# Patient Record
Sex: FEMALE | Race: WHITE | Employment: OTHER | URBAN - METROPOLITAN AREA
[De-identification: names, ages, dates, MRNs, and addresses within clinical notes are randomized per-mention and may not be internally consistent; named-entity substitution may affect disease eponyms.]

---

## 2017-01-03 RX ORDER — TIZANIDINE 4 MG/1
TABLET ORAL
Qty: 90 TABLET | Refills: 1 | Status: SHIPPED | OUTPATIENT
Start: 2017-01-03 | End: 2017-03-06 | Stop reason: SDUPTHER

## 2017-02-03 DIAGNOSIS — J43.9 PULMONARY EMPHYSEMA, UNSPECIFIED EMPHYSEMA TYPE (HCC): ICD-10-CM

## 2017-02-03 RX ORDER — TIOTROPIUM BROMIDE 18 UG/1
CAPSULE ORAL; RESPIRATORY (INHALATION)
Qty: 30 CAPSULE | Refills: 0 | Status: SHIPPED | OUTPATIENT
Start: 2017-02-03 | End: 2017-03-06 | Stop reason: SDUPTHER

## 2017-03-02 ENCOUNTER — TELEPHONE (OUTPATIENT)
Dept: FAMILY MEDICINE CLINIC | Age: 77
End: 2017-03-02

## 2017-03-06 DIAGNOSIS — J43.9 PULMONARY EMPHYSEMA, UNSPECIFIED EMPHYSEMA TYPE (HCC): ICD-10-CM

## 2017-03-06 RX ORDER — TIZANIDINE 4 MG/1
TABLET ORAL
Qty: 90 TABLET | Refills: 1 | Status: SHIPPED | OUTPATIENT
Start: 2017-03-06 | End: 2017-05-17 | Stop reason: SDUPTHER

## 2017-03-06 RX ORDER — TIOTROPIUM BROMIDE 18 UG/1
CAPSULE ORAL; RESPIRATORY (INHALATION)
Qty: 30 CAPSULE | Refills: 5 | Status: SHIPPED | OUTPATIENT
Start: 2017-03-06 | End: 2017-08-08 | Stop reason: SDUPTHER

## 2017-04-03 ENCOUNTER — TELEPHONE (OUTPATIENT)
Dept: FAMILY MEDICINE CLINIC | Age: 77
End: 2017-04-03

## 2017-04-12 ENCOUNTER — OFFICE VISIT (OUTPATIENT)
Dept: FAMILY MEDICINE CLINIC | Age: 77
End: 2017-04-12

## 2017-04-12 VITALS
WEIGHT: 168 LBS | BODY MASS INDEX: 26.37 KG/M2 | SYSTOLIC BLOOD PRESSURE: 130 MMHG | HEIGHT: 67 IN | DIASTOLIC BLOOD PRESSURE: 80 MMHG | TEMPERATURE: 98.4 F | RESPIRATION RATE: 16 BRPM | HEART RATE: 86 BPM

## 2017-04-12 DIAGNOSIS — N81.4 UTEROVAGINAL PROLAPSE: Primary | ICD-10-CM

## 2017-04-12 DIAGNOSIS — Z72.0 TOBACCO ABUSE: ICD-10-CM

## 2017-04-12 DIAGNOSIS — N28.9 RENAL INSUFFICIENCY: ICD-10-CM

## 2017-04-12 DIAGNOSIS — I87.2 VENOUS INSUFFICIENCY OF BOTH LOWER EXTREMITIES: ICD-10-CM

## 2017-04-12 DIAGNOSIS — N39.3 STRESS INCONTINENCE: ICD-10-CM

## 2017-04-12 DIAGNOSIS — J43.9 PULMONARY EMPHYSEMA, UNSPECIFIED EMPHYSEMA TYPE (HCC): ICD-10-CM

## 2017-04-12 DIAGNOSIS — Z01.810 PRE-OPERATIVE CARDIOVASCULAR EXAMINATION: ICD-10-CM

## 2017-04-12 DIAGNOSIS — I10 ESSENTIAL HYPERTENSION: ICD-10-CM

## 2017-04-12 PROCEDURE — 99214 OFFICE O/P EST MOD 30 MIN: CPT | Performed by: FAMILY MEDICINE

## 2017-04-12 PROCEDURE — 93000 ELECTROCARDIOGRAM COMPLETE: CPT | Performed by: FAMILY MEDICINE

## 2017-04-12 RX ORDER — FUROSEMIDE 20 MG/1
10 TABLET ORAL DAILY
Qty: 60 TABLET | Refills: 3 | COMMUNITY
Start: 2017-04-12 | End: 2017-08-08

## 2017-04-25 ENCOUNTER — TELEPHONE (OUTPATIENT)
Dept: FAMILY MEDICINE CLINIC | Age: 77
End: 2017-04-25

## 2017-05-18 RX ORDER — TIZANIDINE 4 MG/1
TABLET ORAL
Qty: 90 TABLET | Refills: 0 | Status: SHIPPED | OUTPATIENT
Start: 2017-05-18 | End: 2017-06-23 | Stop reason: SDUPTHER

## 2017-06-15 RX ORDER — BUPROPION HYDROCHLORIDE 150 MG/1
TABLET, EXTENDED RELEASE ORAL
Qty: 30 TABLET | Refills: 0 | Status: SHIPPED | OUTPATIENT
Start: 2017-06-15 | End: 2017-07-21 | Stop reason: SDUPTHER

## 2017-06-15 RX ORDER — NABUMETONE 750 MG/1
TABLET, FILM COATED ORAL
Qty: 90 TABLET | Refills: 0 | Status: SHIPPED | OUTPATIENT
Start: 2017-06-15 | End: 2017-09-13 | Stop reason: SDUPTHER

## 2017-06-23 DIAGNOSIS — J43.9 PULMONARY EMPHYSEMA, UNSPECIFIED EMPHYSEMA TYPE (HCC): ICD-10-CM

## 2017-06-23 RX ORDER — TIZANIDINE 4 MG/1
TABLET ORAL
Qty: 90 TABLET | Refills: 3 | Status: SHIPPED | OUTPATIENT
Start: 2017-06-23 | End: 2017-11-01 | Stop reason: SDUPTHER

## 2017-06-23 RX ORDER — THEOPHYLLINE 400 MG/1
TABLET, EXTENDED RELEASE ORAL
Qty: 30 TABLET | Refills: 3 | Status: SHIPPED | OUTPATIENT
Start: 2017-06-23 | End: 2017-10-16 | Stop reason: SDUPTHER

## 2017-06-23 RX ORDER — DILTIAZEM HYDROCHLORIDE 120 MG/1
CAPSULE, EXTENDED RELEASE ORAL
Qty: 30 CAPSULE | Refills: 3 | Status: SHIPPED | OUTPATIENT
Start: 2017-06-23 | End: 2017-10-16 | Stop reason: SDUPTHER

## 2017-07-21 RX ORDER — BUPROPION HYDROCHLORIDE 150 MG/1
TABLET, EXTENDED RELEASE ORAL
Qty: 30 TABLET | Refills: 3 | Status: SHIPPED | OUTPATIENT
Start: 2017-07-21 | End: 2017-10-16 | Stop reason: SDUPTHER

## 2017-08-08 DIAGNOSIS — I87.2 VENOUS INSUFFICIENCY OF BOTH LOWER EXTREMITIES: ICD-10-CM

## 2017-08-08 DIAGNOSIS — J43.9 PULMONARY EMPHYSEMA, UNSPECIFIED EMPHYSEMA TYPE (HCC): ICD-10-CM

## 2017-08-08 RX ORDER — TIOTROPIUM BROMIDE 18 UG/1
CAPSULE ORAL; RESPIRATORY (INHALATION)
Qty: 30 CAPSULE | Refills: 0 | Status: SHIPPED | OUTPATIENT
Start: 2017-08-08 | End: 2017-09-05

## 2017-08-08 RX ORDER — FUROSEMIDE 20 MG/1
TABLET ORAL
Qty: 60 TABLET | Refills: 0 | Status: SHIPPED | OUTPATIENT
Start: 2017-08-08 | End: 2017-10-09 | Stop reason: SDUPTHER

## 2017-08-18 DIAGNOSIS — M19.91 PRIMARY OSTEOARTHRITIS, UNSPECIFIED SITE: ICD-10-CM

## 2017-08-19 RX ORDER — TRAMADOL HYDROCHLORIDE 50 MG/1
TABLET ORAL
Qty: 90 TABLET | Refills: 0 | Status: SHIPPED | OUTPATIENT
Start: 2017-08-19 | End: 2018-01-19 | Stop reason: SDUPTHER

## 2017-09-05 DIAGNOSIS — J43.9 PULMONARY EMPHYSEMA, UNSPECIFIED EMPHYSEMA TYPE (HCC): ICD-10-CM

## 2017-09-05 RX ORDER — TIOTROPIUM BROMIDE 18 UG/1
CAPSULE ORAL; RESPIRATORY (INHALATION)
Qty: 30 CAPSULE | Refills: 5 | Status: SHIPPED | OUTPATIENT
Start: 2017-09-05 | End: 2017-10-16 | Stop reason: SDUPTHER

## 2017-09-13 RX ORDER — NABUMETONE 750 MG/1
TABLET, FILM COATED ORAL
Qty: 90 TABLET | Refills: 0 | Status: SHIPPED | OUTPATIENT
Start: 2017-09-13 | End: 2017-12-04 | Stop reason: SDUPTHER

## 2017-10-16 ENCOUNTER — OFFICE VISIT (OUTPATIENT)
Dept: FAMILY MEDICINE CLINIC | Age: 77
End: 2017-10-16

## 2017-10-16 VITALS
TEMPERATURE: 97.5 F | SYSTOLIC BLOOD PRESSURE: 138 MMHG | BODY MASS INDEX: 25.43 KG/M2 | RESPIRATION RATE: 16 BRPM | HEIGHT: 67 IN | WEIGHT: 162 LBS | HEART RATE: 94 BPM | DIASTOLIC BLOOD PRESSURE: 88 MMHG

## 2017-10-16 DIAGNOSIS — E78.00 HYPERCHOLESTEROLEMIA: ICD-10-CM

## 2017-10-16 DIAGNOSIS — J45.40 MODERATE PERSISTENT ASTHMA WITHOUT COMPLICATION: ICD-10-CM

## 2017-10-16 DIAGNOSIS — N28.9 RENAL INSUFFICIENCY: ICD-10-CM

## 2017-10-16 DIAGNOSIS — Z72.0 TOBACCO ABUSE: ICD-10-CM

## 2017-10-16 DIAGNOSIS — F43.22 ADJUSTMENT DISORDER WITH ANXIOUS MOOD: ICD-10-CM

## 2017-10-16 DIAGNOSIS — I10 ESSENTIAL HYPERTENSION: ICD-10-CM

## 2017-10-16 DIAGNOSIS — J43.9 PULMONARY EMPHYSEMA, UNSPECIFIED EMPHYSEMA TYPE (HCC): Primary | ICD-10-CM

## 2017-10-16 DIAGNOSIS — K40.90 NON-RECURRENT UNILATERAL INGUINAL HERNIA WITHOUT OBSTRUCTION OR GANGRENE: ICD-10-CM

## 2017-10-16 DIAGNOSIS — Z23 NEEDS FLU SHOT: ICD-10-CM

## 2017-10-16 PROCEDURE — 90662 IIV NO PRSV INCREASED AG IM: CPT | Performed by: FAMILY MEDICINE

## 2017-10-16 PROCEDURE — 99214 OFFICE O/P EST MOD 30 MIN: CPT | Performed by: FAMILY MEDICINE

## 2017-10-16 PROCEDURE — G0008 ADMIN INFLUENZA VIRUS VAC: HCPCS | Performed by: FAMILY MEDICINE

## 2017-10-16 RX ORDER — DILTIAZEM HYDROCHLORIDE 120 MG/1
120 CAPSULE, COATED, EXTENDED RELEASE ORAL DAILY
Qty: 90 CAPSULE | Refills: 1 | Status: SHIPPED | OUTPATIENT
Start: 2017-10-16 | End: 2018-04-16 | Stop reason: SDUPTHER

## 2017-10-16 RX ORDER — THEOPHYLLINE 400 MG/1
200 TABLET, EXTENDED RELEASE ORAL 2 TIMES DAILY
Qty: 90 TABLET | Refills: 1 | Status: SHIPPED | OUTPATIENT
Start: 2017-10-16 | End: 2018-04-16 | Stop reason: SDUPTHER

## 2017-10-16 RX ORDER — BUPROPION HYDROCHLORIDE 150 MG/1
150 TABLET, EXTENDED RELEASE ORAL DAILY
Qty: 90 TABLET | Refills: 1 | Status: SHIPPED | OUTPATIENT
Start: 2017-10-16 | End: 2018-05-10 | Stop reason: SDUPTHER

## 2017-10-16 NOTE — PROGRESS NOTES
Vaccine Information Sheet, \"Influenza - Inactivated\"  given to Garrett Euceda, or parent/legal guardian of  Garrett Euceda and verbalized understanding. Patient responses:    Have you ever had a reaction to a flu vaccine? No  Are you able to eat eggs without adverse effects? No  Do you have any current illness? No  Have you ever had Guillian Century Syndrome? No    Flu vaccine given per order. Please see immunization tab.

## 2017-10-16 NOTE — PROGRESS NOTES
CHART REVIEW  Health Maintenance   Topic Date Due    DTaP/Tdap/Td vaccine (2 - Td) 07/23/2013    Flu vaccine (1) 09/01/2017    Colon cancer screen colonoscopy  09/01/2018    Lipid screen  06/24/2021    Zostavax vaccine  Completed    DEXA (modify frequency per FRAX score)  Completed    Pneumococcal low/med risk  Completed      Patient Active Problem List   Diagnosis    Asthma    HTN (hypertension)    Renal insufficiency    Cataract    Hypercholesterolemia LDL goal < 160    Stress incontinence    Macular degeneration    Psoriasis    Osteoporosis- DXA -3.2 (11/14), -2.9 (8/11), -3.3 (9/08)    Osteoarthritis    IBS (irritable bowel syndrome)    Colon polyp- colon due 9/13    Screening mammogram, encounter for    Tobacco abuse    Pulmonary nodule, right    Frequent PVCs    COPD (chronic obstructive pulmonary disease) ([facility] [address]    Eczema of right eyelid    Adjustment disorder with anxious mood    Sciatica    Low back pain    Inguinal hernia, right    Venous insufficiency of both lower extremities    Uterovaginal prolapse    Non-recurrent unilateral inguinal hernia without obstruction or gangrene     Cholesterol, Total   Date Value Ref Range Status   06/24/2016 192 0 - 199 mg/dL Final     LDL Calculated   Date Value Ref Range Status   06/24/2016 81 <100 mg/dL Final     HDL   Date Value Ref Range Status   06/24/2016 88 (H) 40 - 60 mg/dL Final   03/20/2010 60 40 - 60 mg/dl Final     Triglycerides   Date Value Ref Range Status   06/24/2016 117 0 - 150 mg/dL Final     Lab Results   Component Value Date    GLUCOSE 107 (H) 12/12/2016     Lab Results   Component Value Date     12/12/2016    K 4.2 12/12/2016    CREATININE 1.0 12/12/2016     Lab Results   Component Value Date    WBC 9.1 12/12/2016    HGB 13.6 12/12/2016    HCT 40.8 12/12/2016    MCV 92.7 12/12/2016     12/12/2016     Lab Results   Component Value Date    ALT 16 06/24/2016    AST 17 06/24/2016    ALKPHOS 97 06/24/2016 surgical, social and family histories were reviewed and updated as appropriate (See above). Objective:   PHYSICAL EXAM  /88 (Site: Right Arm, Position: Sitting, Cuff Size: Large Adult)   Pulse 94   Temp 97.5 °F (36.4 °C) (Oral)   Resp 16   Ht 5' 7\" (1.702 m)   Wt 162 lb (73.5 kg)   BMI 25.37 kg/m²   Blood pressure is Good. BP Readings from Last 5 Encounters:   10/16/17 138/88   04/12/17 130/80   12/12/16 130/80   08/26/16 120/70   06/09/16 130/80     Weight is increased. Wt Readings from Last 5 Encounters:   10/16/17 162 lb (73.5 kg)   04/12/17 168 lb (76.2 kg)   12/12/16 168 lb (76.2 kg)   08/26/16 174 lb (78.9 kg)   06/09/16 174 lb (78.9 kg)      GENERAL:   · well-developed, well-nourished, alert, no distress. EYES: glasses  · External findings: lids and lashes normal and conjunctivae and sclerae normal  · Eyes: no periorbital cellulitis. ENT:   · External nose and ears appear normal  NECK:   · No adenopathy, supple, symmetrical, trachea midline  · Thyroid not enlarged, symmetric, no tenderness/mass/nodules  LYMPH:  · no cervical nodes, no supraclavicular nodes  LUNGS:    · Breathing unlabored  · clear to auscultation bilaterally and good air movement  · Palpation: Normal  CARDIOVASC:   · Regular rate and rhythm, S1, S2 normal. No murmur, click, rub or gallop  · Apical impulse normal  · LEGS:  Lower extremity edema: none    SKIN: warm and dry  PSYCH:    · Alert and oriented  · Normal reasoning, insight good  · Facial expressions full, mood appropriate  · ABD- small L inguinal hernia, easily reducible      Assessment and Plan:     1. Pulmonary emphysema, unspecified emphysema type (HCC)  tiotropium (SPIRIVA HANDIHALER) 18 MCG inhalation capsule    theophylline (UNIPHYL) 400 MG extended release tablet    CBC Auto Differential   2. Needs flu shot  INFLUENZA, HIGH DOSE, 65 YRS +, IM, PF, PREFILL SYR, 0.5ML (FLUZONE HD)   3. Moderate persistent asthma without complication     4.  Essential

## 2017-10-16 NOTE — PATIENT INSTRUCTIONS
If you have questions about a medical condition or this instruction, always ask your healthcare professional. [place] [id number] any warranty or liability for your use of this information.

## 2017-10-20 DIAGNOSIS — I10 ESSENTIAL HYPERTENSION: ICD-10-CM

## 2017-10-20 DIAGNOSIS — E78.00 HYPERCHOLESTEROLEMIA: ICD-10-CM

## 2017-10-20 DIAGNOSIS — J43.9 PULMONARY EMPHYSEMA, UNSPECIFIED EMPHYSEMA TYPE (HCC): ICD-10-CM

## 2017-10-20 LAB
A/G RATIO: 1.3 (ref 1.1–2.2)
ALBUMIN SERPL-MCNC: 4.2 G/DL (ref 3.4–5)
ALP BLD-CCNC: 118 U/L (ref 40–129)
ALT SERPL-CCNC: 18 U/L (ref 10–40)
ANION GAP SERPL CALCULATED.3IONS-SCNC: 16 MMOL/L (ref 3–16)
AST SERPL-CCNC: 17 U/L (ref 15–37)
BASOPHILS ABSOLUTE: 0.1 K/UL (ref 0–0.2)
BASOPHILS RELATIVE PERCENT: 0.6 %
BILIRUB SERPL-MCNC: 0.3 MG/DL (ref 0–1)
BUN BLDV-MCNC: 19 MG/DL (ref 7–20)
CALCIUM SERPL-MCNC: 9.9 MG/DL (ref 8.3–10.6)
CHLORIDE BLD-SCNC: 99 MMOL/L (ref 99–110)
CHOLESTEROL, TOTAL: 183 MG/DL (ref 0–199)
CO2: 28 MMOL/L (ref 21–32)
CREAT SERPL-MCNC: 0.9 MG/DL (ref 0.6–1.2)
EOSINOPHILS ABSOLUTE: 0.3 K/UL (ref 0–0.6)
EOSINOPHILS RELATIVE PERCENT: 3 %
GFR AFRICAN AMERICAN: >60
GFR NON-AFRICAN AMERICAN: >60
GLOBULIN: 3.3 G/DL
GLUCOSE BLD-MCNC: 101 MG/DL (ref 70–99)
HCT VFR BLD CALC: 42.9 % (ref 36–48)
HDLC SERPL-MCNC: 98 MG/DL (ref 40–60)
HEMOGLOBIN: 14.4 G/DL (ref 12–16)
LDL CHOLESTEROL CALCULATED: 67 MG/DL
LYMPHOCYTES ABSOLUTE: 1.6 K/UL (ref 1–5.1)
LYMPHOCYTES RELATIVE PERCENT: 19.1 %
MCH RBC QN AUTO: 32.2 PG (ref 26–34)
MCHC RBC AUTO-ENTMCNC: 33.5 G/DL (ref 31–36)
MCV RBC AUTO: 96.1 FL (ref 80–100)
MONOCYTES ABSOLUTE: 1.2 K/UL (ref 0–1.3)
MONOCYTES RELATIVE PERCENT: 14.6 %
NEUTROPHILS ABSOLUTE: 5.3 K/UL (ref 1.7–7.7)
NEUTROPHILS RELATIVE PERCENT: 62.7 %
PDW BLD-RTO: 15 % (ref 12.4–15.4)
PLATELET # BLD: 279 K/UL (ref 135–450)
PMV BLD AUTO: 8.5 FL (ref 5–10.5)
POTASSIUM SERPL-SCNC: 4.4 MMOL/L (ref 3.5–5.1)
RBC # BLD: 4.46 M/UL (ref 4–5.2)
SODIUM BLD-SCNC: 143 MMOL/L (ref 136–145)
TOTAL PROTEIN: 7.5 G/DL (ref 6.4–8.2)
TRIGL SERPL-MCNC: 88 MG/DL (ref 0–150)
VLDLC SERPL CALC-MCNC: 18 MG/DL
WBC # BLD: 8.5 K/UL (ref 4–11)

## 2017-12-05 RX ORDER — NABUMETONE 750 MG/1
TABLET, FILM COATED ORAL
Qty: 90 TABLET | Refills: 0 | Status: SHIPPED | OUTPATIENT
Start: 2017-12-05 | End: 2018-03-09 | Stop reason: SDUPTHER

## 2018-01-19 DIAGNOSIS — M19.91 PRIMARY OSTEOARTHRITIS, UNSPECIFIED SITE: ICD-10-CM

## 2018-01-19 RX ORDER — TRAMADOL HYDROCHLORIDE 50 MG/1
TABLET ORAL
Qty: 90 TABLET | Refills: 0 | Status: SHIPPED | OUTPATIENT
Start: 2018-01-19 | End: 2019-10-02 | Stop reason: SDUPTHER

## 2018-01-19 RX ORDER — TIZANIDINE 4 MG/1
TABLET ORAL
Qty: 90 TABLET | Refills: 0 | Status: SHIPPED | OUTPATIENT
Start: 2018-01-19 | End: 2018-02-13 | Stop reason: SDUPTHER

## 2018-02-14 RX ORDER — TIZANIDINE 4 MG/1
TABLET ORAL
Qty: 90 TABLET | Refills: 0 | Status: SHIPPED | OUTPATIENT
Start: 2018-02-14 | End: 2018-04-02 | Stop reason: SDUPTHER

## 2018-03-09 RX ORDER — NABUMETONE 750 MG/1
TABLET, FILM COATED ORAL
Qty: 90 TABLET | Refills: 1 | Status: SHIPPED | OUTPATIENT
Start: 2018-03-09 | End: 2018-09-07 | Stop reason: SDUPTHER

## 2018-04-03 RX ORDER — TIZANIDINE 4 MG/1
TABLET ORAL
Qty: 90 TABLET | Refills: 0 | Status: SHIPPED | OUTPATIENT
Start: 2018-04-03 | End: 2018-05-10 | Stop reason: SDUPTHER

## 2018-04-16 ENCOUNTER — OFFICE VISIT (OUTPATIENT)
Dept: FAMILY MEDICINE CLINIC | Age: 78
End: 2018-04-16

## 2018-04-16 VITALS
RESPIRATION RATE: 16 BRPM | WEIGHT: 159 LBS | DIASTOLIC BLOOD PRESSURE: 84 MMHG | TEMPERATURE: 98.4 F | BODY MASS INDEX: 24.96 KG/M2 | SYSTOLIC BLOOD PRESSURE: 136 MMHG | HEIGHT: 67 IN | HEART RATE: 90 BPM

## 2018-04-16 DIAGNOSIS — K59.09 CHRONIC CONSTIPATION: ICD-10-CM

## 2018-04-16 DIAGNOSIS — I10 ESSENTIAL HYPERTENSION: ICD-10-CM

## 2018-04-16 DIAGNOSIS — Z72.0 TOBACCO ABUSE: ICD-10-CM

## 2018-04-16 DIAGNOSIS — I87.2 VENOUS INSUFFICIENCY OF BOTH LOWER EXTREMITIES: ICD-10-CM

## 2018-04-16 DIAGNOSIS — R73.9 HYPERGLYCEMIA: ICD-10-CM

## 2018-04-16 DIAGNOSIS — J43.9 PULMONARY EMPHYSEMA, UNSPECIFIED EMPHYSEMA TYPE (HCC): Primary | ICD-10-CM

## 2018-04-16 PROCEDURE — G8427 DOCREV CUR MEDS BY ELIG CLIN: HCPCS | Performed by: FAMILY MEDICINE

## 2018-04-16 PROCEDURE — 1123F ACP DISCUSS/DSCN MKR DOCD: CPT | Performed by: FAMILY MEDICINE

## 2018-04-16 PROCEDURE — 3023F SPIROM DOC REV: CPT | Performed by: FAMILY MEDICINE

## 2018-04-16 PROCEDURE — 4040F PNEUMOC VAC/ADMIN/RCVD: CPT | Performed by: FAMILY MEDICINE

## 2018-04-16 PROCEDURE — 4004F PT TOBACCO SCREEN RCVD TLK: CPT | Performed by: FAMILY MEDICINE

## 2018-04-16 PROCEDURE — G8926 SPIRO NO PERF OR DOC: HCPCS | Performed by: FAMILY MEDICINE

## 2018-04-16 PROCEDURE — G8399 PT W/DXA RESULTS DOCUMENT: HCPCS | Performed by: FAMILY MEDICINE

## 2018-04-16 PROCEDURE — 99214 OFFICE O/P EST MOD 30 MIN: CPT | Performed by: FAMILY MEDICINE

## 2018-04-16 PROCEDURE — 1090F PRES/ABSN URINE INCON ASSESS: CPT | Performed by: FAMILY MEDICINE

## 2018-04-16 PROCEDURE — G8420 CALC BMI NORM PARAMETERS: HCPCS | Performed by: FAMILY MEDICINE

## 2018-04-16 RX ORDER — FUROSEMIDE 20 MG/1
TABLET ORAL
Qty: 60 TABLET | Refills: 2 | Status: SHIPPED | OUTPATIENT
Start: 2018-04-16 | End: 2018-08-20 | Stop reason: DRUGHIGH

## 2018-04-16 RX ORDER — DILTIAZEM HYDROCHLORIDE 120 MG/1
120 CAPSULE, COATED, EXTENDED RELEASE ORAL DAILY
Qty: 90 CAPSULE | Refills: 1 | Status: SHIPPED | OUTPATIENT
Start: 2018-04-16 | End: 2018-10-10 | Stop reason: SDUPTHER

## 2018-04-16 RX ORDER — THEOPHYLLINE 400 MG/1
200 TABLET, EXTENDED RELEASE ORAL 2 TIMES DAILY
Qty: 90 TABLET | Refills: 1 | Status: SHIPPED | OUTPATIENT
Start: 2018-04-16 | End: 2018-10-16 | Stop reason: SDUPTHER

## 2018-04-16 ASSESSMENT — PATIENT HEALTH QUESTIONNAIRE - PHQ9
1. LITTLE INTEREST OR PLEASURE IN DOING THINGS: 0
2. FEELING DOWN, DEPRESSED OR HOPELESS: 0
SUM OF ALL RESPONSES TO PHQ QUESTIONS 1-9: 0
SUM OF ALL RESPONSES TO PHQ9 QUESTIONS 1 & 2: 0

## 2018-04-30 RX ORDER — ALBUTEROL SULFATE 90 UG/1
2 AEROSOL, METERED RESPIRATORY (INHALATION) EVERY 4 HOURS PRN
Qty: 8.5 G | Refills: 0 | Status: SHIPPED | OUTPATIENT
Start: 2018-04-30 | End: 2018-10-16 | Stop reason: SDUPTHER

## 2018-05-10 DIAGNOSIS — F43.22 ADJUSTMENT DISORDER WITH ANXIOUS MOOD: ICD-10-CM

## 2018-05-11 RX ORDER — BUPROPION HYDROCHLORIDE 150 MG/1
150 TABLET, EXTENDED RELEASE ORAL DAILY
Qty: 90 TABLET | Refills: 1 | Status: SHIPPED | OUTPATIENT
Start: 2018-05-11 | End: 2018-11-14 | Stop reason: SDUPTHER

## 2018-05-11 RX ORDER — TIZANIDINE 4 MG/1
TABLET ORAL
Qty: 90 TABLET | Refills: 1 | Status: SHIPPED | OUTPATIENT
Start: 2018-05-11 | End: 2018-07-20 | Stop reason: SDUPTHER

## 2018-06-07 DIAGNOSIS — J43.9 PULMONARY EMPHYSEMA, UNSPECIFIED EMPHYSEMA TYPE (HCC): ICD-10-CM

## 2018-06-07 RX ORDER — TIOTROPIUM BROMIDE 18 UG/1
CAPSULE ORAL; RESPIRATORY (INHALATION)
Qty: 90 CAPSULE | Refills: 1 | Status: SHIPPED | OUTPATIENT
Start: 2018-06-07 | End: 2018-12-11 | Stop reason: SDUPTHER

## 2018-06-08 ENCOUNTER — TELEPHONE (OUTPATIENT)
Dept: FAMILY MEDICINE CLINIC | Age: 78
End: 2018-06-08

## 2018-06-08 DIAGNOSIS — H26.9 CATARACT, UNSPECIFIED CATARACT TYPE, UNSPECIFIED LATERALITY: Primary | ICD-10-CM

## 2018-06-08 DIAGNOSIS — H35.30 MACULAR DEGENERATION: ICD-10-CM

## 2018-07-17 RX ORDER — DICYCLOMINE HCL 20 MG
TABLET ORAL
Qty: 360 TABLET | Refills: 5 | Status: SHIPPED | OUTPATIENT
Start: 2018-07-17 | End: 2019-04-16 | Stop reason: SDUPTHER

## 2018-07-23 ENCOUNTER — TELEPHONE (OUTPATIENT)
Dept: FAMILY MEDICINE CLINIC | Age: 78
End: 2018-07-23

## 2018-07-23 DIAGNOSIS — K40.90 NON-RECURRENT UNILATERAL INGUINAL HERNIA WITHOUT OBSTRUCTION OR GANGRENE: Primary | ICD-10-CM

## 2018-07-23 RX ORDER — TIZANIDINE 4 MG/1
TABLET ORAL
Qty: 90 TABLET | Refills: 0 | Status: SHIPPED | OUTPATIENT
Start: 2018-07-23 | End: 2018-08-24 | Stop reason: SDUPTHER

## 2018-07-23 NOTE — TELEPHONE ENCOUNTER
Referral in for surgery  General, Laparoscopic & Vascular Surgery, [address], [address] [place] [id number]   Phone: [phone]  Fax: [phone]

## 2018-07-26 ENCOUNTER — INITIAL CONSULT (OUTPATIENT)
Dept: SURGERY | Age: 78
End: 2018-07-26

## 2018-07-26 VITALS
HEIGHT: 67 IN | DIASTOLIC BLOOD PRESSURE: 100 MMHG | HEART RATE: 103 BPM | WEIGHT: 153 LBS | SYSTOLIC BLOOD PRESSURE: 195 MMHG | BODY MASS INDEX: 24.01 KG/M2

## 2018-07-26 DIAGNOSIS — Z72.0 TOBACCO ABUSE: ICD-10-CM

## 2018-07-26 DIAGNOSIS — K40.90 LEFT INGUINAL HERNIA: Primary | ICD-10-CM

## 2018-07-26 DIAGNOSIS — I10 ESSENTIAL HYPERTENSION: ICD-10-CM

## 2018-07-26 PROCEDURE — 1101F PT FALLS ASSESS-DOCD LE1/YR: CPT | Performed by: SURGERY

## 2018-07-26 PROCEDURE — G8427 DOCREV CUR MEDS BY ELIG CLIN: HCPCS | Performed by: SURGERY

## 2018-07-26 PROCEDURE — 1090F PRES/ABSN URINE INCON ASSESS: CPT | Performed by: SURGERY

## 2018-07-26 PROCEDURE — 99204 OFFICE O/P NEW MOD 45 MIN: CPT | Performed by: SURGERY

## 2018-07-26 PROCEDURE — G8420 CALC BMI NORM PARAMETERS: HCPCS | Performed by: SURGERY

## 2018-07-26 ASSESSMENT — ENCOUNTER SYMPTOMS
SHORTNESS OF BREATH: 1
WHEEZING: 1
ABDOMINAL PAIN: 1
VOMITING: 0
CONSTIPATION: 1
NAUSEA: 0

## 2018-07-26 NOTE — PROGRESS NOTES
Subjective:      Patient ID: Stephani Love is a [de-identified] y.o. female. HPI  Chief Complaint: hernia  Patient referred by Dr. Parth Mccabe for evaluation of hernia. Patient reports symptoms of discomfort, swelling. C/o nocturia and urinary frequency over last year. Location of symptoms is left groin. C/o constipation improved with metamucil. Symptoms were first noted one year ago. History of right inguinal hernia repair as well as bladder sling. Patient has a history of emphysema and continues to smoke. Not on oxygen. BP today 200/100s. Will plan following treatment: left inguinal hernia repair once medically cleared and BP improved.     Past Medical History:   Diagnosis Date    HTN (hypertension)     Hypercholesterolemia     MAC (mycobacterium avium-intracellulare complex)     Osteoarthritis     Osteopetrosis     Papanicolaou smear of cervix with atypical squamous cells of undetermined significance (ASC-US) 1998    Shingles     Stress incontinence        Past Surgical History:   Procedure Laterality Date    BREAST SURGERY      CATARACT REMOVAL Left 2009    CHOLECYSTECTOMY      INGUINAL HERNIA REPAIR Right 2015       Current Outpatient Prescriptions   Medication Sig Dispense Refill    tiZANidine (ZANAFLEX) 4 MG tablet TAKE 1 TABLET BY MOUTH THREE TIMES DAILY AS NEEDED 90 tablet 0    dicyclomine (BENTYL) 20 MG tablet TAKE 1 TABLET BY MOUTH EVERY 6 HOURS 360 tablet 5    SPIRIVA HANDIHALER 18 MCG inhalation capsule INHALE THE CONTENTS OF 1 CAPSULE INTO THE LUNGS DAILY VIA HANDIHALER 90 capsule 1    buPROPion (WELLBUTRIN SR) 150 MG extended release tablet TAKE 1 TABLET BY MOUTH DAILY 90 tablet 1    albuterol sulfate HFA (PROAIR HFA) 108 (90 Base) MCG/ACT inhaler Inhale 2 puffs into the lungs every 4 hours as needed for Wheezing 8.5 g 0    theophylline (UNIPHYL) 400 MG extended release tablet Take 0.5 tablets by mouth 2 times daily 90 tablet 1    furosemide (LASIX) 20 MG tablet TAKE 1 TO 2 TABLETS BY MOUTH She has a normal mood and affect. Her behavior is normal. Judgment and thought content normal.   Vitals reviewed. Assessment:       Diagnosis Orders   1. Left inguinal hernia     2. Tobacco abuse     3. Essential hypertension             Plan:      Recommended left inguinal hernia repair  The risks, benefits and alternatives to the planned procedure were discussed. Patient expressed an understanding and is willing to proceed. Biggest risk is likely anesthesia given her pulmonary status. Also significantly hypertensive today. Needs improvement in BP with medical clearance and preop H+P prior to proceeding  Encouraged smoking cessation but she has no interest  Discussed unlikely to improve urinary frequency.   Needs to F/u with Dr. Na Hebert

## 2018-08-20 ENCOUNTER — OFFICE VISIT (OUTPATIENT)
Dept: FAMILY MEDICINE CLINIC | Age: 78
End: 2018-08-20

## 2018-08-20 VITALS
HEIGHT: 67 IN | TEMPERATURE: 98.4 F | RESPIRATION RATE: 16 BRPM | BODY MASS INDEX: 23.7 KG/M2 | DIASTOLIC BLOOD PRESSURE: 100 MMHG | SYSTOLIC BLOOD PRESSURE: 164 MMHG | HEART RATE: 102 BPM | WEIGHT: 151 LBS

## 2018-08-20 DIAGNOSIS — I87.2 VENOUS INSUFFICIENCY OF BOTH LOWER EXTREMITIES: ICD-10-CM

## 2018-08-20 DIAGNOSIS — R73.9 HYPERGLYCEMIA: ICD-10-CM

## 2018-08-20 DIAGNOSIS — K40.90 LEFT INGUINAL HERNIA: Primary | ICD-10-CM

## 2018-08-20 DIAGNOSIS — R53.83 MALAISE AND FATIGUE: ICD-10-CM

## 2018-08-20 DIAGNOSIS — I10 UNCONTROLLED HYPERTENSION: ICD-10-CM

## 2018-08-20 DIAGNOSIS — R53.81 MALAISE AND FATIGUE: ICD-10-CM

## 2018-08-20 LAB
ANION GAP SERPL CALCULATED.3IONS-SCNC: 16 MMOL/L (ref 3–16)
BASOPHILS ABSOLUTE: 0.1 K/UL (ref 0–0.2)
BASOPHILS RELATIVE PERCENT: 0.8 %
BUN BLDV-MCNC: 18 MG/DL (ref 7–20)
CALCIUM SERPL-MCNC: 9.7 MG/DL (ref 8.3–10.6)
CHLORIDE BLD-SCNC: 100 MMOL/L (ref 99–110)
CO2: 25 MMOL/L (ref 21–32)
CREAT SERPL-MCNC: 1.1 MG/DL (ref 0.6–1.2)
EOSINOPHILS ABSOLUTE: 0.1 K/UL (ref 0–0.6)
EOSINOPHILS RELATIVE PERCENT: 1.3 %
GFR AFRICAN AMERICAN: 58
GFR NON-AFRICAN AMERICAN: 48
GLUCOSE BLD-MCNC: 103 MG/DL (ref 70–99)
HCT VFR BLD CALC: 41.4 % (ref 36–48)
HEMOGLOBIN: 13.9 G/DL (ref 12–16)
LYMPHOCYTES ABSOLUTE: 1.8 K/UL (ref 1–5.1)
LYMPHOCYTES RELATIVE PERCENT: 19.8 %
MCH RBC QN AUTO: 32.3 PG (ref 26–34)
MCHC RBC AUTO-ENTMCNC: 33.6 G/DL (ref 31–36)
MCV RBC AUTO: 95.9 FL (ref 80–100)
MONOCYTES ABSOLUTE: 0.9 K/UL (ref 0–1.3)
MONOCYTES RELATIVE PERCENT: 10.5 %
NEUTROPHILS ABSOLUTE: 6.1 K/UL (ref 1.7–7.7)
NEUTROPHILS RELATIVE PERCENT: 67.6 %
PDW BLD-RTO: 15.6 % (ref 12.4–15.4)
PLATELET # BLD: 297 K/UL (ref 135–450)
PMV BLD AUTO: 8.3 FL (ref 5–10.5)
POTASSIUM SERPL-SCNC: 4.3 MMOL/L (ref 3.5–5.1)
RBC # BLD: 4.32 M/UL (ref 4–5.2)
SODIUM BLD-SCNC: 141 MMOL/L (ref 136–145)
TSH SERPL DL<=0.05 MIU/L-ACNC: 1.52 UIU/ML (ref 0.27–4.2)
WBC # BLD: 8.9 K/UL (ref 4–11)

## 2018-08-20 PROCEDURE — G8420 CALC BMI NORM PARAMETERS: HCPCS | Performed by: FAMILY MEDICINE

## 2018-08-20 PROCEDURE — 4004F PT TOBACCO SCREEN RCVD TLK: CPT | Performed by: FAMILY MEDICINE

## 2018-08-20 PROCEDURE — 1123F ACP DISCUSS/DSCN MKR DOCD: CPT | Performed by: FAMILY MEDICINE

## 2018-08-20 PROCEDURE — 1101F PT FALLS ASSESS-DOCD LE1/YR: CPT | Performed by: FAMILY MEDICINE

## 2018-08-20 PROCEDURE — 93000 ELECTROCARDIOGRAM COMPLETE: CPT | Performed by: FAMILY MEDICINE

## 2018-08-20 PROCEDURE — 1090F PRES/ABSN URINE INCON ASSESS: CPT | Performed by: FAMILY MEDICINE

## 2018-08-20 PROCEDURE — 99214 OFFICE O/P EST MOD 30 MIN: CPT | Performed by: FAMILY MEDICINE

## 2018-08-20 PROCEDURE — G8399 PT W/DXA RESULTS DOCUMENT: HCPCS | Performed by: FAMILY MEDICINE

## 2018-08-20 PROCEDURE — G8427 DOCREV CUR MEDS BY ELIG CLIN: HCPCS | Performed by: FAMILY MEDICINE

## 2018-08-20 PROCEDURE — 4040F PNEUMOC VAC/ADMIN/RCVD: CPT | Performed by: FAMILY MEDICINE

## 2018-08-20 RX ORDER — FUROSEMIDE 20 MG/1
TABLET ORAL
Qty: 60 TABLET | Refills: 2 | Status: SHIPPED
Start: 2018-08-20 | End: 2019-01-17

## 2018-08-20 RX ORDER — METOPROLOL SUCCINATE 50 MG/1
50 TABLET, EXTENDED RELEASE ORAL DAILY
Qty: 30 TABLET | Refills: 3 | Status: SHIPPED | OUTPATIENT
Start: 2018-08-20 | End: 2018-09-26 | Stop reason: SDUPTHER

## 2018-08-20 NOTE — PROGRESS NOTES
mg by mouth daily.  calcium-vitamin D (OSCAL) 250-125 MG-UNIT per tablet Take 1 tablet by mouth daily.  Simethicone (GAS-X EXTRA STRENGTH) 125 MG CAPS Take 1-4 capsules by mouth daily.  diphenhydrAMINE (BENADRYL) 25 MG tablet Take 25 mg by mouth 2 times daily. No current facility-administered medications for this visit. Cholesterol, Total   Date Value Ref Range Status   10/20/2017 183 0 - 199 mg/dL Final     LDL Calculated   Date Value Ref Range Status   10/20/2017 67 <100 mg/dL Final     HDL   Date Value Ref Range Status   10/20/2017 98 (H) 40 - 60 mg/dL Final   2010 60 40 - 60 mg/dl Final     Triglycerides   Date Value Ref Range Status   10/20/2017 88 0 - 150 mg/dL Final     Lab Results   Component Value Date    GLUCOSE 101 (H) 10/20/2017     Lab Results   Component Value Date     10/20/2017    K 4.4 10/20/2017    CREATININE 0.9 10/20/2017     Lab Results   Component Value Date    WBC 8.5 10/20/2017    HGB 14.4 10/20/2017    HCT 42.9 10/20/2017    MCV 96.1 10/20/2017     10/20/2017     Lab Results   Component Value Date    ALT 18 10/20/2017    AST 17 10/20/2017    ALKPHOS 118 10/20/2017    BILITOT 0.3 10/20/2017     TSH (uIU/mL)   Date Value   2015 1.00     Lab Results   Component Value Date    LABA1C 5.3 2016      Subjective:   HPI CHRONIC:   Chief Complaint   Patient presents with    Hypertension    Patient here for follow-up of multiple chronic conditions includin. Left inguinal hernia    2. Uncontrolled hypertension    3. Venous insufficiency of both lower extremities    4. Malaise and fatigue    5. Hyperglycemia      Complaints: BP was high at surgeon office   Feeling anxious about hernia surgery  More fatigued than usual recently  Continuous polyuria. Trying off lasix did not help    · Following appropriate diet? Yes  · Exercise: walking intermittently  · Taking medicines daily as directed? Yes  · Any side effects of medications? No    ROS:  General ROS: fever? No,    night sweats? No  Ophthalmic ROS:blurry vision or decreased vision? No  Endocrine ROS: fatigue? No   unexpected weight changes? No  Respiratory ROS: cough? No   shortness of breath? No  Cardiovascular ROS:chest pain? No   shortness of breath with exertion? No  Gastrointestinal ROS: abdominal pain? No   change in stools? No  Genito-Urinary ROS: painful urination? No   trouble voiding? No  Neurological ROS: TIA or stroke symptoms? No   numbness/tingling in feet? No  Dermatological ROS: rash? No     HISTORY:  Patient's medications, allergies, past medical, surgical, social and family histories were reviewed and updated as appropriate (See above). Objective:   PHYSICAL EXAM   BP (!) 164/100 (Site: Right Arm, Position: Sitting, Cuff Size: Large Adult)   Pulse 102   Temp 98.4 °F (36.9 °C) (Oral)   Resp 16   Ht 5' 7\" (1.702 m)   Wt 151 lb (68.5 kg)   BMI 23.65 kg/m²   Blood pressure is Poor. BP Readings from Last 5 Encounters:   08/20/18 (!) 164/100   07/26/18 (!) 195/100   04/16/18 136/84   10/16/17 138/88   04/12/17 130/80     Weight is decreased. Wt Readings from Last 5 Encounters:   08/20/18 151 lb (68.5 kg)   07/26/18 153 lb (69.4 kg)   04/16/18 159 lb (72.1 kg)   10/16/17 162 lb (73.5 kg)   04/12/17 168 lb (76.2 kg)      GENERAL:   · well-developed, well-nourished, alert, no distress. EYES:   · External findings: lids and lashes normal and conjunctivae and sclerae normal  LUNGS:    · Breathing unlabored  · clear to auscultation bilaterally and good air movement  CARDIOVASC:   · Tachy, regular rhythm, S1, S2 normal. No murmur, click, rub or gallop  · Apical impulse normal  · LEGS:  Lower extremity edema: none    SKIN: warm and dry  PSYCH:    · Alert and oriented  · Normal reasoning, insight good  · Facial expressions full, mood appropriate  · No memory disturbance noted    EKG: normal sinus rhythm, LAE, unchanged from previous tracings. No acute changes.

## 2018-08-21 LAB
ESTIMATED AVERAGE GLUCOSE: 93.9 MG/DL
HBA1C MFR BLD: 4.9 %

## 2018-08-27 RX ORDER — TIZANIDINE 4 MG/1
TABLET ORAL
Qty: 90 TABLET | Refills: 0 | Status: SHIPPED | OUTPATIENT
Start: 2018-08-27 | End: 2018-09-26 | Stop reason: SDUPTHER

## 2018-09-07 RX ORDER — NABUMETONE 750 MG/1
TABLET, FILM COATED ORAL
Qty: 90 TABLET | Refills: 0 | Status: SHIPPED | OUTPATIENT
Start: 2018-09-07 | End: 2018-12-03 | Stop reason: SDUPTHER

## 2018-09-10 ENCOUNTER — NURSE ONLY (OUTPATIENT)
Dept: FAMILY MEDICINE CLINIC | Age: 78
End: 2018-09-10

## 2018-09-10 VITALS — DIASTOLIC BLOOD PRESSURE: 96 MMHG | SYSTOLIC BLOOD PRESSURE: 158 MMHG

## 2018-09-10 DIAGNOSIS — Z01.30 BLOOD PRESSURE CHECK: Primary | ICD-10-CM

## 2018-09-26 ENCOUNTER — OFFICE VISIT (OUTPATIENT)
Dept: FAMILY MEDICINE CLINIC | Age: 78
End: 2018-09-26
Payer: MEDICARE

## 2018-09-26 VITALS
SYSTOLIC BLOOD PRESSURE: 160 MMHG | RESPIRATION RATE: 16 BRPM | HEART RATE: 78 BPM | BODY MASS INDEX: 24.01 KG/M2 | HEIGHT: 67 IN | TEMPERATURE: 98.6 F | WEIGHT: 153 LBS | DIASTOLIC BLOOD PRESSURE: 100 MMHG

## 2018-09-26 DIAGNOSIS — I10 UNCONTROLLED HYPERTENSION: Primary | ICD-10-CM

## 2018-09-26 DIAGNOSIS — J43.9 PULMONARY EMPHYSEMA, UNSPECIFIED EMPHYSEMA TYPE (HCC): ICD-10-CM

## 2018-09-26 DIAGNOSIS — Z23 NEED FOR INFLUENZA VACCINATION: ICD-10-CM

## 2018-09-26 DIAGNOSIS — J45.40 MODERATE PERSISTENT ASTHMA WITHOUT COMPLICATION: ICD-10-CM

## 2018-09-26 DIAGNOSIS — Z72.0 TOBACCO ABUSE: ICD-10-CM

## 2018-09-26 PROCEDURE — G8399 PT W/DXA RESULTS DOCUMENT: HCPCS | Performed by: FAMILY MEDICINE

## 2018-09-26 PROCEDURE — G8427 DOCREV CUR MEDS BY ELIG CLIN: HCPCS | Performed by: FAMILY MEDICINE

## 2018-09-26 PROCEDURE — 1123F ACP DISCUSS/DSCN MKR DOCD: CPT | Performed by: FAMILY MEDICINE

## 2018-09-26 PROCEDURE — G0008 ADMIN INFLUENZA VIRUS VAC: HCPCS | Performed by: FAMILY MEDICINE

## 2018-09-26 PROCEDURE — 1090F PRES/ABSN URINE INCON ASSESS: CPT | Performed by: FAMILY MEDICINE

## 2018-09-26 PROCEDURE — 99214 OFFICE O/P EST MOD 30 MIN: CPT | Performed by: FAMILY MEDICINE

## 2018-09-26 PROCEDURE — 3023F SPIROM DOC REV: CPT | Performed by: FAMILY MEDICINE

## 2018-09-26 PROCEDURE — 4004F PT TOBACCO SCREEN RCVD TLK: CPT | Performed by: FAMILY MEDICINE

## 2018-09-26 PROCEDURE — G8926 SPIRO NO PERF OR DOC: HCPCS | Performed by: FAMILY MEDICINE

## 2018-09-26 PROCEDURE — 90662 IIV NO PRSV INCREASED AG IM: CPT | Performed by: FAMILY MEDICINE

## 2018-09-26 PROCEDURE — G8420 CALC BMI NORM PARAMETERS: HCPCS | Performed by: FAMILY MEDICINE

## 2018-09-26 PROCEDURE — 1101F PT FALLS ASSESS-DOCD LE1/YR: CPT | Performed by: FAMILY MEDICINE

## 2018-09-26 PROCEDURE — 4040F PNEUMOC VAC/ADMIN/RCVD: CPT | Performed by: FAMILY MEDICINE

## 2018-09-26 RX ORDER — TIZANIDINE 4 MG/1
TABLET ORAL
Qty: 90 TABLET | Refills: 0 | Status: SHIPPED | OUTPATIENT
Start: 2018-09-26 | End: 2018-11-01 | Stop reason: SDUPTHER

## 2018-09-26 RX ORDER — METOPROLOL SUCCINATE 100 MG/1
100 TABLET, EXTENDED RELEASE ORAL DAILY
Qty: 30 TABLET | Refills: 3 | Status: SHIPPED | OUTPATIENT
Start: 2018-09-26 | End: 2019-02-04 | Stop reason: SDUPTHER

## 2018-10-10 ENCOUNTER — TELEPHONE (OUTPATIENT)
Dept: FAMILY MEDICINE CLINIC | Age: 78
End: 2018-10-10

## 2018-10-10 ENCOUNTER — NURSE ONLY (OUTPATIENT)
Dept: FAMILY MEDICINE CLINIC | Age: 78
End: 2018-10-10

## 2018-10-10 VITALS — SYSTOLIC BLOOD PRESSURE: 200 MMHG | DIASTOLIC BLOOD PRESSURE: 90 MMHG

## 2018-10-10 DIAGNOSIS — I10 ESSENTIAL HYPERTENSION: ICD-10-CM

## 2018-10-10 RX ORDER — DILTIAZEM HYDROCHLORIDE 120 MG/1
120 CAPSULE, COATED, EXTENDED RELEASE ORAL 2 TIMES DAILY
Qty: 180 CAPSULE | Refills: 1 | Status: SHIPPED | OUTPATIENT
Start: 2018-10-10 | End: 2019-04-09 | Stop reason: SDUPTHER

## 2018-10-10 RX ORDER — HYDROCHLOROTHIAZIDE 25 MG/1
25 TABLET ORAL EVERY MORNING
Qty: 90 TABLET | Refills: 1 | Status: SHIPPED | OUTPATIENT
Start: 2018-10-10 | End: 2019-04-04 | Stop reason: SDUPTHER

## 2018-10-16 ENCOUNTER — OFFICE VISIT (OUTPATIENT)
Dept: FAMILY MEDICINE CLINIC | Age: 78
End: 2018-10-16
Payer: MEDICARE

## 2018-10-16 VITALS
WEIGHT: 150 LBS | DIASTOLIC BLOOD PRESSURE: 78 MMHG | HEART RATE: 66 BPM | BODY MASS INDEX: 23.54 KG/M2 | TEMPERATURE: 98.6 F | HEIGHT: 67 IN | SYSTOLIC BLOOD PRESSURE: 128 MMHG

## 2018-10-16 DIAGNOSIS — I10 ESSENTIAL HYPERTENSION: ICD-10-CM

## 2018-10-16 DIAGNOSIS — R91.1 PULMONARY NODULE, RIGHT: ICD-10-CM

## 2018-10-16 DIAGNOSIS — J43.9 PULMONARY EMPHYSEMA, UNSPECIFIED EMPHYSEMA TYPE (HCC): ICD-10-CM

## 2018-10-16 DIAGNOSIS — E78.00 HYPERCHOLESTEROLEMIA: ICD-10-CM

## 2018-10-16 DIAGNOSIS — Z00.00 MEDICARE ANNUAL WELLNESS VISIT, SUBSEQUENT: Primary | ICD-10-CM

## 2018-10-16 DIAGNOSIS — Z72.0 TOBACCO ABUSE: ICD-10-CM

## 2018-10-16 DIAGNOSIS — J45.40 MODERATE PERSISTENT ASTHMA WITHOUT COMPLICATION: ICD-10-CM

## 2018-10-16 DIAGNOSIS — G25.2 INTENTION TREMOR: ICD-10-CM

## 2018-10-16 DIAGNOSIS — R73.9 HYPERGLYCEMIA: ICD-10-CM

## 2018-10-16 DIAGNOSIS — K40.90 NON-RECURRENT UNILATERAL INGUINAL HERNIA WITHOUT OBSTRUCTION OR GANGRENE: ICD-10-CM

## 2018-10-16 LAB
A/G RATIO: 1.6 (ref 1.1–2.2)
ALBUMIN SERPL-MCNC: 4.6 G/DL (ref 3.4–5)
ALP BLD-CCNC: 123 U/L (ref 40–129)
ALT SERPL-CCNC: 17 U/L (ref 10–40)
ANION GAP SERPL CALCULATED.3IONS-SCNC: 17 MMOL/L (ref 3–16)
AST SERPL-CCNC: 24 U/L (ref 15–37)
BILIRUB SERPL-MCNC: 0.4 MG/DL (ref 0–1)
BUN BLDV-MCNC: 23 MG/DL (ref 7–20)
CALCIUM SERPL-MCNC: 10.2 MG/DL (ref 8.3–10.6)
CHLORIDE BLD-SCNC: 97 MMOL/L (ref 99–110)
CHOLESTEROL, TOTAL: 172 MG/DL (ref 0–199)
CO2: 26 MMOL/L (ref 21–32)
CREAT SERPL-MCNC: 1 MG/DL (ref 0.6–1.2)
GFR AFRICAN AMERICAN: >60
GFR NON-AFRICAN AMERICAN: 53
GLOBULIN: 2.9 G/DL
GLUCOSE BLD-MCNC: 99 MG/DL (ref 70–99)
HDLC SERPL-MCNC: 83 MG/DL (ref 40–60)
LDL CHOLESTEROL CALCULATED: 66 MG/DL
POTASSIUM SERPL-SCNC: 4.3 MMOL/L (ref 3.5–5.1)
SODIUM BLD-SCNC: 140 MMOL/L (ref 136–145)
TOTAL PROTEIN: 7.5 G/DL (ref 6.4–8.2)
TRIGL SERPL-MCNC: 117 MG/DL (ref 0–150)
VLDLC SERPL CALC-MCNC: 23 MG/DL

## 2018-10-16 PROCEDURE — 1090F PRES/ABSN URINE INCON ASSESS: CPT | Performed by: FAMILY MEDICINE

## 2018-10-16 PROCEDURE — 1123F ACP DISCUSS/DSCN MKR DOCD: CPT | Performed by: FAMILY MEDICINE

## 2018-10-16 PROCEDURE — 4040F PNEUMOC VAC/ADMIN/RCVD: CPT | Performed by: FAMILY MEDICINE

## 2018-10-16 PROCEDURE — G8399 PT W/DXA RESULTS DOCUMENT: HCPCS | Performed by: FAMILY MEDICINE

## 2018-10-16 PROCEDURE — 1101F PT FALLS ASSESS-DOCD LE1/YR: CPT | Performed by: FAMILY MEDICINE

## 2018-10-16 PROCEDURE — 3023F SPIROM DOC REV: CPT | Performed by: FAMILY MEDICINE

## 2018-10-16 PROCEDURE — 4004F PT TOBACCO SCREEN RCVD TLK: CPT | Performed by: FAMILY MEDICINE

## 2018-10-16 PROCEDURE — G8926 SPIRO NO PERF OR DOC: HCPCS | Performed by: FAMILY MEDICINE

## 2018-10-16 PROCEDURE — 99214 OFFICE O/P EST MOD 30 MIN: CPT | Performed by: FAMILY MEDICINE

## 2018-10-16 PROCEDURE — G8420 CALC BMI NORM PARAMETERS: HCPCS | Performed by: FAMILY MEDICINE

## 2018-10-16 PROCEDURE — G0439 PPPS, SUBSEQ VISIT: HCPCS | Performed by: FAMILY MEDICINE

## 2018-10-16 PROCEDURE — G8427 DOCREV CUR MEDS BY ELIG CLIN: HCPCS | Performed by: FAMILY MEDICINE

## 2018-10-16 PROCEDURE — G8482 FLU IMMUNIZE ORDER/ADMIN: HCPCS | Performed by: FAMILY MEDICINE

## 2018-10-16 RX ORDER — ALBUTEROL SULFATE 90 UG/1
2 AEROSOL, METERED RESPIRATORY (INHALATION) EVERY 4 HOURS PRN
Qty: 8.5 G | Refills: 0 | Status: SHIPPED | OUTPATIENT
Start: 2018-10-16 | End: 2019-04-16 | Stop reason: SDUPTHER

## 2018-10-16 RX ORDER — THEOPHYLLINE 400 MG/1
200 TABLET, EXTENDED RELEASE ORAL 2 TIMES DAILY
Qty: 90 TABLET | Refills: 1 | Status: SHIPPED | OUTPATIENT
Start: 2018-10-16 | End: 2019-04-09 | Stop reason: SDUPTHER

## 2018-10-16 ASSESSMENT — LIFESTYLE VARIABLES
HOW OFTEN DO YOU HAVE SIX OR MORE DRINKS ON ONE OCCASION: 0
HAS A RELATIVE, FRIEND, DOCTOR, OR ANOTHER HEALTH PROFESSIONAL EXPRESSED CONCERN ABOUT YOUR DRINKING OR SUGGESTED YOU CUT DOWN: 0
AUDIT-C TOTAL SCORE: 1
HOW OFTEN DURING THE LAST YEAR HAVE YOU FAILED TO DO WHAT WAS NORMALLY EXPECTED FROM YOU BECAUSE OF DRINKING: 0
HOW OFTEN DURING THE LAST YEAR HAVE YOU BEEN UNABLE TO REMEMBER WHAT HAPPENED THE NIGHT BEFORE BECAUSE YOU HAD BEEN DRINKING: 0
AUDIT TOTAL SCORE: 1
HAVE YOU OR SOMEONE ELSE BEEN INJURED AS A RESULT OF YOUR DRINKING: 0
HOW OFTEN DURING THE LAST YEAR HAVE YOU NEEDED AN ALCOHOLIC DRINK FIRST THING IN THE MORNING TO GET YOURSELF GOING AFTER A NIGHT OF HEAVY DRINKING: 0
HOW OFTEN DURING THE LAST YEAR HAVE YOU FOUND THAT YOU WERE NOT ABLE TO STOP DRINKING ONCE YOU HAD STARTED: 0
HOW MANY STANDARD DRINKS CONTAINING ALCOHOL DO YOU HAVE ON A TYPICAL DAY: 0
HOW OFTEN DURING THE LAST YEAR HAVE YOU HAD A FEELING OF GUILT OR REMORSE AFTER DRINKING: 0
HOW OFTEN DO YOU HAVE A DRINK CONTAINING ALCOHOL: 1

## 2018-10-16 ASSESSMENT — PATIENT HEALTH QUESTIONNAIRE - PHQ9
SUM OF ALL RESPONSES TO PHQ QUESTIONS 1-9: 2
2. FEELING DOWN, DEPRESSED OR HOPELESS: 1
SUM OF ALL RESPONSES TO PHQ QUESTIONS 1-9: 2
SUM OF ALL RESPONSES TO PHQ9 QUESTIONS 1 & 2: 2
SUM OF ALL RESPONSES TO PHQ QUESTIONS 1-9: 2
1. LITTLE INTEREST OR PLEASURE IN DOING THINGS: 1
SUM OF ALL RESPONSES TO PHQ QUESTIONS 1-9: 2

## 2018-10-16 ASSESSMENT — ANXIETY QUESTIONNAIRES: GAD7 TOTAL SCORE: 4

## 2018-10-16 NOTE — PROGRESS NOTES
Medicare Annual Wellness Visit  Name: Abimael Doherty  YOB: 1940  Age: [de-identified] y.o. Sex: female  MRN: [id number]     Date of Service:  10/16/2018    Scribe documentation: Suresh Crum am scribing for Ry Mcgowan MD. Electronically signed by Estee Lozano  on 10/16/2018 at 11:22 AM.  MD Attestation: Nicol Viveros,  personally performed the services described in this documentation, as scribed by the user listed above, and it is both accurate and complete.      LAST LABS   Cholesterol, Total   Date Value Ref Range Status   10/20/2017 183 0 - 199 mg/dL Final     LDL Calculated   Date Value Ref Range Status   10/20/2017 67 <100 mg/dL Final     HDL   Date Value Ref Range Status   10/20/2017 98 (H) 40 - 60 mg/dL Final   03/20/2010 60 40 - 60 mg/dl Final     Triglycerides   Date Value Ref Range Status   10/20/2017 88 0 - 150 mg/dL Final     Lab Results   Component Value Date    GLUCOSE 103 (H) 08/20/2018     Lab Results   Component Value Date     08/20/2018    K 4.3 08/20/2018    CREATININE 1.1 08/20/2018     Lab Results   Component Value Date    ALT 18 10/20/2017    AST 17 10/20/2017    ALKPHOS 118 10/20/2017    BILITOT 0.3 10/20/2017      Lab Results   Component Value Date    WBC 8.9 08/20/2018    HGB 13.9 08/20/2018    HCT 41.4 08/20/2018    MCV 95.9 08/20/2018     08/20/2018     TSH (uIU/mL)   Date Value   08/20/2018 1.52     Lab Results   Component Value Date    LABA1C 4.9 08/20/2018     CHART REVIEW  Patient Active Problem List   Diagnosis    Asthma    Essential hypertension    Renal insufficiency    Cataract    Hypercholesterolemia LDL goal < 160    Stress incontinence    Macular degeneration    Psoriasis    Osteoporosis- DXA -3.2 (11/14), -2.9 (8/11), -3.3 (9/08)    Osteoarthritis    IBS (irritable bowel syndrome)    Colon polyp- stop due to age   [place] Tobacco abuse    Pulmonary nodule, right    Frequent PVCs    COPD (chronic obstructive pulmonary disease) ([address]    squamous cells of undetermined significance (ASC-US) 1998    Shingles     Stress incontinence      Past Surgical History:   Procedure Laterality Date    BREAST SURGERY      CATARACT REMOVAL Left 2009    CHOLECYSTECTOMY      INGUINAL HERNIA REPAIR Right 2015     Family History   Problem Relation Age of Onset    Diabetes Mother     Cancer Sister     Coronary Art Dis Sister     Coronary Art Dis Brother     Stroke Brother      Social History   Substance Use Topics    Smoking status: Current Every Day Smoker     Packs/day: 0.50     Years: 65.00     Types: Cigarettes    Smokeless tobacco: Never Used      Comment: Advised to quit repeatedly, not interested    Alcohol use Yes      Comment: rare     Patient's medications, allergies, past medical, surgical, social and family histories were reviewed and updated as appropriate. CareTeam (Including outside providers/suppliers regularly involved in providing care):   Patient Care Team:  Yanci Ramsey MD as PCP - General (Family Medicine)  Yanci Ramsey MD as PCP - S Attributed Provider  Francois Huber MD as Consulting Physician (Obstetrics & Gynecology)  Martina Diana MD as Consulting Physician (Ophthalmology)    The following problems were reviewed today and where indicated follow up appointments were made and/or referrals ordered.     Positive Risk Factor Screenings with Interventions:     Fall Risk:  Timed Up and Go Test > 12 seconds?: no  2 or more falls in past year?: (!) yes  Fall with injury in past year?: no  Fall Risk Assessment[de-identified] (!) Positive Screening for Falls  Fall Risk Interventions:    · Patient declines any further evaluation/treatment for this issue          Anxiety:  Anxiety Score: 4  Anxiety Screening: (!) Positive Screening for Anxiety  Anxiety Interventions:  · Patient declines any further evaluation/treatment for this issue      Substance Abuse:  Social History     Tobacco History     Smoking Status  Current Every Day Smoker Smoking

## 2018-10-16 NOTE — PATIENT INSTRUCTIONS
get up quickly. This can make you dizzy, and you might lose your balance and fall. Home Safety: How Well Does Your Home Meet Your Needs? Steps/Stairways/Walkways YesNo    Are they in good shape? Do they have a smooth, safe surface? Are there handrails on both sides of the stairway? How about light switches at the top and bottom of the stairs? Is there grasping space for both knuckles and fingers on railings? Are the stair treads deep enough for your whole foot? Would a ramp be feasible in any of these areas if it became necessary? Floor Surfaces YesNo    Is the surface safe? [place]? Any throw rugs or doormats that might slip underfoot? Is carpeting loose or torn? Are there changes in floor levels? If so, are they obvious or well marked? Do you have to step over any electric, telephone, or extension cords? [facility]    Is there always space to park? Is it convenient to the entrance? Does the garage door open automatically? Windows & Doors [place]    Are windows and doors easy to open and close? Are locks sturdy and easy to operate? Do doorways accommodate a walker or wheelchair? Can you walk through the doorways easily? Is there space to maneuver while opening and closing doors? Does the front door have a view panel or peephole at the right height? Appliances/Kitchen/[place]    Is the room arranged safely and conveniently? Do the oven and fridge open easily? Are stove controls clearly marked and easy to use? Is the counter the right height and depth? Can you work sitting down? Are cabinet doorknobs easy to use? Are faucets easy to use? Do you have a hand-held shower head? Are the items you use often on high shelves? Do you have a step stool with handles? Can you easily get in and out of the tub or shower? Do you have a bath or shower seat? Are there grab bars where needed?     Is the hot water heater regulated to prevent scalding or burning? Lighting/Ventilation YesNo    Are there enough lights, and are they bright enough? Do you have night lights where needed? Is area well ventilated? Electrical Outlets/Switches/Alarms YesNo    Can you turn switches easily on and off? Are outlets properly grounded to prevent a shock? Are extension cords in good shape? Do you have smoke detectors in all key areas? Do you have an alarm system? Is the telephone readily available for emergencies? Does the telephone have volume control? Can you hear the doorbell ring all throughout the house? LACTOSE INTOLANCE  Definition      Lactose intolerance, also called lactase deficiency, means you aren't able to fully digest the milk sugar (lactose) in dairy products. It's usually not dangerous, but symptoms of lactose intolerance can be uncomfortable. A deficiency of lactase -- an enzyme produced by the lining of your small intestine -- is usually responsible for lactose intolerance. Many people have low levels of lactase, but only those who also have associated signs and symptoms have, by definition, lactose intolerance. You can control symptoms of lactose intolerance by carefully choosing a diet that limits dairy products. Symptoms  The signs and symptoms of lactose intolerance usually begin 30 minutes to two hours after eating or drinking foods that contain lactose. Common signs and symptoms include:   Diarrhea   Nausea, and sometimes, vomiting   Abdominal cramps   Bloating   Gas   Symptoms are usually mild, but may sometimes be severe. Causes    Lactose intolerance is usually caused by low levels of the enzyme lactase in your small intestine that lead to signs and symptoms. Normally, the cells that line your small intestine produce an enzyme called lactase.  The lactase enzyme attaches to lactose molecules in the food you eat and breaks them into two simple sugars -- glucose and galactose -- which can be absorbed into your bloodstream.   Without enough of the lactase enzyme, most of the lactose in your food moves unprocessed into the colon, where the normal intestinal bacteria interact with it. This causes the hallmarks of lactose intolerance -- gas, bloating and diarrhea. There are three types of lactose intolerance. Normal result of aging for some people (primary lactose intolerance)   Normally, your body produces large amounts of lactase at birth and during early childhood, when milk is the primary source of nutrition. Usually your lactase production decreases as your diet becomes more varied and less reliant on milk. This gradual decline may lead to symptoms of lactose intolerance. Result of illness or injury (secondary lactose intolerance)   This form of lactose intolerance occurs when your small intestine decreases lactase production after an illness, surgery or injury to your small intestine. It can occur as a result of intestinal diseases, such as celiac disease, gastroenteritis and an inflammatory bowel disease like Crohn's disease. Treatment of the underlying disorder may restore lactase levels and improve signs and symptoms, though it can take time. Condition you're born with (congenital lactose intolerance)   It's possible, but rare, for babies to be born with lactose intolerance caused by a complete absence of lactase activity. This disorder is passed from generation to generation in a pattern of inheritance called autosomal recessive. This means that both the mother and the father must pass on the defective form of the gene for a child to be affected. Infants with congenital lactose intolerance are intolerant of the lactose in their mothers' breast milk and have diarrhea from birth. These babies require lactose-free infant formulas. Premature infants may also have lactose intolerance because of an insufficient lactase level.  In babies who are these products. Alternative medicine  Probiotics   Probiotics are living organisms present in your intestines that help maintain a healthy digestive system. Probiotics are also available as active or \"live\" cultures in some yogurts and as supplements in capsule form. They are sometimes used for gastrointestinal conditions, such as diarrhea and irritable bowel syndrome. They may also help your body digest lactose. Probiotics are generally considered safe and may be worth a try if other methods don't help. What Happens to Your Body After You Quit Smoking? Smoking is an addiction and your body makes changes when it tries to overcome an addiction. These changes can be immediate and uncomfortable, such as symptoms of withdrawal, while others cause improvements in overall health and well-being. Withdrawal   When you first quit smoking you will experience withdrawal symptoms because your body is no longer receiving the amount of nicotine it was used to. Symptoms of withdrawal usually begin within hours of your last cigarette, peaking about two to three days later, and can continue for a few days or up to several weeks. Symptoms of nicotine withdrawal include anxiety, irritability, restlessness, difficulty concentrating, strong cravings, depressed mood, frustration or anger, increased appetite, insomnia, cough, chest tightness and constipation or diarrhea. Early Effects  Short-term benefits of quitting include decreased heart rate and blood pressure within 20 minutes of quitting, carbon monoxide blood levels drop to normal after 12 hours, circulation improves and lung function increases two to three weeks after your quit date. During the first nine months smoke free, your coughing and shortness of breath will improve, and the tiny cilia hairs in your lungs will regain function, allowing your body to handle mucus and clean your lungs, reducing your risk of infections.  Also, you will begin to experience the symptoms of nicotine withdrawal.    Long -Term Effects  During the next 15 years, your body will begin to repair itself and your health will improve. After year one, your chance of coronary heart disease is reduced by 50 percent. After 5 years your risk of stroke becomes equal to that of a non-smoker and after 10 years your risk of lung cancer among other cancers is reduced by about 50 percent. Fifteen years after you quit your risk of coronary heart disease becomes equal to that of a nonsmoker. Quitting will help to slow the onset of wrinkles, and will make you much less likely to experience the premature aging of your skin and yellowing of your teeth and fingers that are characteristics of aging smokers. Longevity  Woman on hospital bed [address] [facility] Young/[employer]   It has been estimated that as a smoker you lose close to a decade and a half of your life to your addiction. If you quit by the age of 27, you may halt the health risks associated with smoking, and if you quit before 50 you drastically reduce your chances of dying early as a result of cigarettes. People who continue to smoke suffer from diseases such as chronic bronchitis, emphysema, heart attacks, strokes and cancer. Quality of Life  All of the diseases caused by cigarette smoking negatively impact your quality of life, long before you die. Your body becomes limited in what it can do. Smoking makes it harder to breathe, get around, work or play. Therefore, after you quit your quality of life will improve because your body no longer be limited by the health problems incurred as a result of your addiction. ADVANCE DIRECTIVES AND DO NOT RESUSCITATE ORDERS     What is an advance directive? An advance directive tells your doctor what kind of care you would like to have if you become unable to make medical decisions (if you are in a coma, for example).  If you are admitted to the hospital, the hospital staff will probably talk to you about advance directives. A good advance directive describes the kind of treatment you would want depending on how sick you are. For example, the directives would describe what kind of care you want if you have an illness that you are unlikely to recover from, or if you are permanently unconscious. Advance directives usually tell your doctor that you don't want certain kinds of treatment. However, they can also say that you want a certain treatment no matter how ill you are. Advance directives can take many forms. Laws about advance directives are different in each state. You should be aware of the laws in your state. What is a living will? A living will is one type of advance directive. It is a written, legal document that describes the kind of medical treatments or life-sustaining treatments you would want if you were seriously or terminally ill. A living will doesn't let you select someone to make decisions for you. What is a durable power of  for health care? A durable power of  (DPA) for health care is another kind of advance directive. A DPA states whom you have chosen to make health care decisions for you. It becomes active any time you are unconscious or unable to make medical decisions. A DPA is generally more useful than a living will. But a DPA may not be a good choice if you don't have another person you trust to make these decisions for you. Living garcia and DPAs are legal in most states. Even if they aren't officially recognized by the law in your state, they can still guide your loved ones and doctor if you are unable to make decisions about your medical care. Ask your doctor,  or state representative about the law in your state. What is a do not resuscitate order? A do not resuscitate (DNR) order is another kind of advance directive.  A DNR is a request not to have cardiopulmonary resuscitation (CPR) if your heart stops or if you have what you have written reviewed by your doctor or a  to make sure your directives are understood exactly as you intended. When you are satisfied with your directives, the orders should be notarized if possible, and copies should be given to your family and your doctor. Can I change my advance directive? You may change or cancel your advance directive at any time, as long as you are considered of sound mind to do so. Being of sound mind means that you are still able to think rationally and communicate your wishes in a clear manner. Again, your changesmust be made, signed and notarized according to the laws in your state. Make sure that your doctor and any family members who knew about your directives are also aware that you have changed them. If you do not have time to put your changes in writing, you can make them known while you are in the hospital. Tell your doctor and any family or friends present exactly what you want to happen. Usually, wishes that are made in person will be followed in place of the ones made earlier in writing. Be sure your instructions are clearly understood by everyone you have told. Personalized Preventive Plan for Ramos Lynch - 10/16/2018  Medicare offers a range of preventive health benefits. Some of the tests and screenings are paid in full while other may be subject to a deductible, co-insurance, and/or copay. Some of these benefits include a comprehensive review of your medical history including lifestyle, illnesses that may run in your family, and various assessments and screenings as appropriate. After reviewing your medical record and screening and assessments performed today your provider may have ordered immunizations, labs, imaging, and/or referrals for you. A list of these orders (if applicable) as well as your Preventive Care list are included within your After Visit Summary for your review.     Other Preventive Recommendations:    · A preventive eye exam performed by an eye specialist is recommended every 1-2 years to screen for glaucoma; cataracts, macular degeneration, and other eye disorders. · A preventive dental visit is recommended every 6 months. · Try to get at least 150 minutes of exercise per week or 10,000 steps per day on a pedometer . · Order or download the FREE \"Exercise & Physical Activity: Your Everyday Guide\" from [employer]. Call 1-224.483.7290 or search [employer] online. · You need 8371-9103 mg of calcium and 8416-0317 IU of vitamin D per day. It is possible to meet your calcium requirement with diet alone, but a vitamin D supplement is usually necessary to meet this goal.  · When exposed to the sun, use a sunscreen that protects against both UVA and UVB radiation with an SPF of 30 or greater. Reapply every 2 to 3 hours or after sweating, drying off with a towel, or swimming. · Always wear a seat belt when traveling in a car. Always wear a helmet when riding a bicycle or motorcycle. TREMOR    What is a tremor? A tremor is twitching or shaking of a body part that you can't control. Most tremors affect the hand, but they can also happen in the arm, head, leg, and even voice. They are more common in middle-aged and older adults, but they can happen at any age. Some tremors are barely noticeable, and some are more severe and may make it hard to write or hold things. There are different types of tremors. A rest tremor happens when the body is relaxed. An action tremor happens while you are moving a part of your body. A postural tremor happens when your arm or leg is held against gravity (for example, holding your arms outstretched). A kinetic or intention tremor happens when you are trying to do a task (for example, drawing or pouring a drink). It is important for you to find out what type of tremor you have. What causes it?   Anyone can have a tremor

## 2018-10-26 ENCOUNTER — TELEPHONE (OUTPATIENT)
Dept: FAMILY MEDICINE CLINIC | Age: 78
End: 2018-10-26

## 2018-10-26 PROBLEM — Z23 NEED FOR INFLUENZA VACCINATION: Status: RESOLVED | Noted: 2018-09-26 | Resolved: 2018-10-26

## 2018-11-01 RX ORDER — TIZANIDINE 4 MG/1
TABLET ORAL
Qty: 90 TABLET | Refills: 3 | Status: SHIPPED | OUTPATIENT
Start: 2018-11-01 | End: 2019-03-12 | Stop reason: SDUPTHER

## 2018-11-14 DIAGNOSIS — F43.22 ADJUSTMENT DISORDER WITH ANXIOUS MOOD: ICD-10-CM

## 2018-11-14 RX ORDER — BUPROPION HYDROCHLORIDE 150 MG/1
150 TABLET, EXTENDED RELEASE ORAL DAILY
Qty: 90 TABLET | Refills: 1 | Status: SHIPPED | OUTPATIENT
Start: 2018-11-14 | End: 2019-04-16 | Stop reason: SDUPTHER

## 2018-12-04 RX ORDER — NABUMETONE 750 MG/1
TABLET, FILM COATED ORAL
Qty: 90 TABLET | Refills: 1 | Status: SHIPPED | OUTPATIENT
Start: 2018-12-04 | End: 2019-04-16 | Stop reason: SDUPTHER

## 2018-12-11 DIAGNOSIS — J43.9 PULMONARY EMPHYSEMA, UNSPECIFIED EMPHYSEMA TYPE (HCC): ICD-10-CM

## 2018-12-12 RX ORDER — TIOTROPIUM BROMIDE 18 UG/1
CAPSULE ORAL; RESPIRATORY (INHALATION)
Qty: 90 CAPSULE | Refills: 1 | Status: SHIPPED | OUTPATIENT
Start: 2018-12-12 | End: 2019-04-16 | Stop reason: SDUPTHER

## 2019-01-16 DIAGNOSIS — I87.2 VENOUS INSUFFICIENCY OF BOTH LOWER EXTREMITIES: ICD-10-CM

## 2019-01-17 RX ORDER — FUROSEMIDE 20 MG/1
TABLET ORAL
Qty: 60 TABLET | Refills: 3 | Status: SHIPPED | OUTPATIENT
Start: 2019-01-17 | End: 2019-04-16 | Stop reason: SDUPTHER

## 2019-02-04 DIAGNOSIS — I10 UNCONTROLLED HYPERTENSION: ICD-10-CM

## 2019-02-04 RX ORDER — METOPROLOL SUCCINATE 100 MG/1
100 TABLET, EXTENDED RELEASE ORAL DAILY
Qty: 30 TABLET | Refills: 2 | Status: SHIPPED | OUTPATIENT
Start: 2019-02-04 | End: 2019-04-16 | Stop reason: SDUPTHER

## 2019-03-12 DIAGNOSIS — J43.9 PULMONARY EMPHYSEMA, UNSPECIFIED EMPHYSEMA TYPE (HCC): ICD-10-CM

## 2019-03-13 RX ORDER — TIZANIDINE 4 MG/1
TABLET ORAL
Qty: 90 TABLET | Refills: 0 | Status: SHIPPED | OUTPATIENT
Start: 2019-03-13 | End: 2019-04-24 | Stop reason: SDUPTHER

## 2019-04-09 DIAGNOSIS — I10 ESSENTIAL HYPERTENSION: ICD-10-CM

## 2019-04-09 DIAGNOSIS — J43.9 PULMONARY EMPHYSEMA, UNSPECIFIED EMPHYSEMA TYPE (HCC): ICD-10-CM

## 2019-04-10 RX ORDER — THEOPHYLLINE 400 MG/1
TABLET, EXTENDED RELEASE ORAL
Qty: 90 TABLET | Refills: 0 | Status: SHIPPED | OUTPATIENT
Start: 2019-04-10 | End: 2019-04-16 | Stop reason: SDUPTHER

## 2019-04-10 RX ORDER — DILTIAZEM HYDROCHLORIDE 120 MG/1
CAPSULE, EXTENDED RELEASE ORAL
Qty: 180 CAPSULE | Refills: 0 | Status: SHIPPED | OUTPATIENT
Start: 2019-04-10 | End: 2019-04-16 | Stop reason: SDUPTHER

## 2019-04-16 ENCOUNTER — OFFICE VISIT (OUTPATIENT)
Dept: FAMILY MEDICINE CLINIC | Age: 79
End: 2019-04-16
Payer: MEDICARE

## 2019-04-16 VITALS
BODY MASS INDEX: 24.33 KG/M2 | HEART RATE: 78 BPM | HEIGHT: 67 IN | WEIGHT: 155 LBS | DIASTOLIC BLOOD PRESSURE: 72 MMHG | RESPIRATION RATE: 16 BRPM | SYSTOLIC BLOOD PRESSURE: 116 MMHG | TEMPERATURE: 97.4 F

## 2019-04-16 DIAGNOSIS — E78.00 HYPERCHOLESTEROLEMIA: ICD-10-CM

## 2019-04-16 DIAGNOSIS — I87.2 VENOUS INSUFFICIENCY OF BOTH LOWER EXTREMITIES: ICD-10-CM

## 2019-04-16 DIAGNOSIS — M81.0 AGE-RELATED OSTEOPOROSIS WITHOUT CURRENT PATHOLOGICAL FRACTURE: ICD-10-CM

## 2019-04-16 DIAGNOSIS — F43.22 ADJUSTMENT DISORDER WITH ANXIOUS MOOD: ICD-10-CM

## 2019-04-16 DIAGNOSIS — M19.91 PRIMARY OSTEOARTHRITIS, UNSPECIFIED SITE: ICD-10-CM

## 2019-04-16 DIAGNOSIS — K58.0 IRRITABLE BOWEL SYNDROME WITH DIARRHEA: ICD-10-CM

## 2019-04-16 DIAGNOSIS — J45.40 MODERATE PERSISTENT ASTHMA WITHOUT COMPLICATION: ICD-10-CM

## 2019-04-16 DIAGNOSIS — J43.9 PULMONARY EMPHYSEMA, UNSPECIFIED EMPHYSEMA TYPE (HCC): ICD-10-CM

## 2019-04-16 DIAGNOSIS — H01.133 ECZEMA OF RIGHT EYELID: ICD-10-CM

## 2019-04-16 DIAGNOSIS — R73.9 HYPERGLYCEMIA: ICD-10-CM

## 2019-04-16 DIAGNOSIS — I10 ESSENTIAL HYPERTENSION: Primary | ICD-10-CM

## 2019-04-16 PROCEDURE — 1090F PRES/ABSN URINE INCON ASSESS: CPT | Performed by: FAMILY MEDICINE

## 2019-04-16 PROCEDURE — G8420 CALC BMI NORM PARAMETERS: HCPCS | Performed by: FAMILY MEDICINE

## 2019-04-16 PROCEDURE — G8427 DOCREV CUR MEDS BY ELIG CLIN: HCPCS | Performed by: FAMILY MEDICINE

## 2019-04-16 PROCEDURE — 1123F ACP DISCUSS/DSCN MKR DOCD: CPT | Performed by: FAMILY MEDICINE

## 2019-04-16 PROCEDURE — 99214 OFFICE O/P EST MOD 30 MIN: CPT | Performed by: FAMILY MEDICINE

## 2019-04-16 PROCEDURE — G8926 SPIRO NO PERF OR DOC: HCPCS | Performed by: FAMILY MEDICINE

## 2019-04-16 PROCEDURE — 3288F FALL RISK ASSESSMENT DOCD: CPT | Performed by: FAMILY MEDICINE

## 2019-04-16 PROCEDURE — 4040F PNEUMOC VAC/ADMIN/RCVD: CPT | Performed by: FAMILY MEDICINE

## 2019-04-16 PROCEDURE — G8510 SCR DEP NEG, NO PLAN REQD: HCPCS | Performed by: FAMILY MEDICINE

## 2019-04-16 PROCEDURE — G8399 PT W/DXA RESULTS DOCUMENT: HCPCS | Performed by: FAMILY MEDICINE

## 2019-04-16 PROCEDURE — 4004F PT TOBACCO SCREEN RCVD TLK: CPT | Performed by: FAMILY MEDICINE

## 2019-04-16 PROCEDURE — 3023F SPIROM DOC REV: CPT | Performed by: FAMILY MEDICINE

## 2019-04-16 RX ORDER — THEOPHYLLINE 400 MG/1
TABLET, EXTENDED RELEASE ORAL
Qty: 90 TABLET | Refills: 1 | Status: SHIPPED | OUTPATIENT
Start: 2019-04-16 | End: 2019-10-11 | Stop reason: SDUPTHER

## 2019-04-16 RX ORDER — METOPROLOL SUCCINATE 100 MG/1
100 TABLET, EXTENDED RELEASE ORAL DAILY
Qty: 30 TABLET | Refills: 2 | Status: SHIPPED | OUTPATIENT
Start: 2019-04-16 | End: 2019-05-08 | Stop reason: SDUPTHER

## 2019-04-16 RX ORDER — NABUMETONE 750 MG/1
TABLET, FILM COATED ORAL
Qty: 90 TABLET | Refills: 1 | Status: SHIPPED | OUTPATIENT
Start: 2019-04-16 | End: 2019-12-02 | Stop reason: SDUPTHER

## 2019-04-16 RX ORDER — BUPROPION HYDROCHLORIDE 150 MG/1
150 TABLET, EXTENDED RELEASE ORAL DAILY
Qty: 90 TABLET | Refills: 1 | Status: SHIPPED | OUTPATIENT
Start: 2019-04-16 | End: 2019-05-08

## 2019-04-16 RX ORDER — FUROSEMIDE 20 MG/1
TABLET ORAL
Qty: 90 TABLET | Refills: 1 | Status: SHIPPED | OUTPATIENT
Start: 2019-04-16 | End: 2020-03-30 | Stop reason: SDUPTHER

## 2019-04-16 RX ORDER — DILTIAZEM HYDROCHLORIDE 120 MG/1
CAPSULE, COATED, EXTENDED RELEASE ORAL
Qty: 180 CAPSULE | Refills: 1 | Status: SHIPPED | OUTPATIENT
Start: 2019-04-16 | End: 2020-01-08 | Stop reason: SDUPTHER

## 2019-04-16 RX ORDER — DICYCLOMINE HCL 20 MG
TABLET ORAL
Qty: 360 TABLET | Refills: 3 | Status: SHIPPED | OUTPATIENT
Start: 2019-04-16 | End: 2019-09-24

## 2019-04-16 RX ORDER — MOMETASONE FUROATE 1 MG/G
CREAM TOPICAL
Qty: 0.1 G | Refills: 3 | Status: SHIPPED | OUTPATIENT
Start: 2019-04-16 | End: 2020-11-23

## 2019-04-16 RX ORDER — HYDROCHLOROTHIAZIDE 25 MG/1
25 TABLET ORAL EVERY MORNING
Qty: 90 TABLET | Refills: 0 | Status: SHIPPED | OUTPATIENT
Start: 2019-04-16 | End: 2019-10-02 | Stop reason: SDUPTHER

## 2019-04-16 RX ORDER — ALBUTEROL SULFATE 90 UG/1
2 AEROSOL, METERED RESPIRATORY (INHALATION) EVERY 4 HOURS PRN
Qty: 8.5 G | Refills: 0 | Status: SHIPPED | OUTPATIENT
Start: 2019-04-16 | End: 2020-04-06 | Stop reason: SDUPTHER

## 2019-04-16 ASSESSMENT — PATIENT HEALTH QUESTIONNAIRE - PHQ9
1. LITTLE INTEREST OR PLEASURE IN DOING THINGS: 0
SUM OF ALL RESPONSES TO PHQ QUESTIONS 1-9: 0
2. FEELING DOWN, DEPRESSED OR HOPELESS: 0
SUM OF ALL RESPONSES TO PHQ QUESTIONS 1-9: 0
SUM OF ALL RESPONSES TO PHQ9 QUESTIONS 1 & 2: 0

## 2019-04-16 NOTE — PROGRESS NOTES
injection Inject 1 mL into the skin once for 1 dose. Inject 1 mL into the skin once for 1 dose. Repeat every 6 months. Schedule at hospital after prior authorization completed. Yes Alvaro Newell MD   mometasone (ELOCON) 0.1 % cream Apply  topically 2 times daily. Yes Alvaro Newell MD   acetaminophen (TYLENOL) 500 MG tablet Take 500 mg by mouth 3 times daily as needed. Yes Historical Provider, MD   aspirin 81 MG EC tablet Take 81 mg by mouth daily. Yes Historical Provider, MD   calcium-vitamin D (OSCAL) 250-125 MG-UNIT per tablet Take 1 tablet by mouth daily. Yes Historical Provider, MD   Simethicone (GAS-X EXTRA STRENGTH) 125 MG CAPS Take 1-4 capsules by mouth daily. Yes Historical Provider, MD   diphenhydrAMINE (BENADRYL) 25 MG tablet Take 25 mg by mouth 2 times daily.    Yes Historical Provider, MD      Family History   Problem Relation Age of Onset    Diabetes Mother     Cancer Sister     Coronary Art Dis Sister     Coronary Art Dis Brother     Stroke Brother      Social History     Tobacco Use    Smoking status: Current Every Day Smoker     Packs/day: 0.50     Years: 65.00     Pack years: 32.50     Types: Cigarettes    Smokeless tobacco: Never Used    Tobacco comment: Advised to quit repeatedly, not interested   Substance Use Topics    Alcohol use: Yes     Comment: rare    Drug use: No      LAST LABS  Cholesterol, Total   Date Value Ref Range Status   10/16/2018 172 0 - 199 mg/dL Final     LDL Calculated   Date Value Ref Range Status   10/16/2018 66 <100 mg/dL Final     HDL   Date Value Ref Range Status   10/16/2018 83 (H) 40 - 60 mg/dL Final   03/20/2010 60 40 - 60 mg/dl Final     Triglycerides   Date Value Ref Range Status   10/16/2018 117 0 - 150 mg/dL Final     Lab Results   Component Value Date    GLUCOSE 99 10/16/2018     Lab Results   Component Value Date     10/16/2018    K 4.3 10/16/2018    CREATININE 1.0 10/16/2018     Lab Results   Component Value Date    WBC 8.9 08/20/2018 HGB 13.9 08/20/2018    HCT 41.4 08/20/2018    MCV 95.9 08/20/2018     08/20/2018     Lab Results   Component Value Date    ALT 17 10/16/2018    AST 24 10/16/2018    ALKPHOS 123 10/16/2018    BILITOT 0.4 10/16/2018     TSH (uIU/mL)   Date Value   08/20/2018 1.52     Lab Results   Component Value Date    LABA1C 4.9 08/20/2018        Objective:   PHYSICAL EXAM   /72 (Site: Left Upper Arm, Position: Sitting, Cuff Size: Large Adult)   Pulse 78   Temp 97.4 °F (36.3 °C) (Oral)   Resp 16   Ht 5' 7\" (1.702 m)   Wt 155 lb (70.3 kg)   BMI 24.28 kg/m²   BP Readings from Last 5 Encounters:   04/16/19 116/72   10/16/18 128/78   10/10/18 (!) 200/90   09/26/18 (!) 160/100   09/10/18 (!) 158/96     Wt Readings from Last 5 Encounters:   04/16/19 155 lb (70.3 kg)   10/16/18 150 lb (68 kg)   09/26/18 153 lb (69.4 kg)   08/20/18 151 lb (68.5 kg)   07/26/18 153 lb (69.4 kg)      GENERAL:   · well-developed, well-nourished, alert, no distress. EYES:   · External findings: lids and lashes normal and conjunctivae and sclerae normal  LUNGS:    · Breathing unlabored  · clear to auscultation bilaterally and good air movement  CARDIOVASC:   · regular rate and rhythm, S1, S2 normal. No murmur, click, rub or gallop  · LEGS:  Lower extremity edema: none    SKIN: warm and dry  PSYCH:    · Alert and oriented  · Normal reasoning, insight good  · Facial expressions full, mood appropriate  · No memory disturbance noted     Assessment and Plan:      Diagnosis Orders   1. Essential hypertension  diltiazem (CARTIA XT) 120 MG extended release capsule    hydrochlorothiazide (HYDRODIURIL) 25 MG tablet    metoprolol succinate (TOPROL XL) 100 MG extended release tablet   2. Moderate persistent asthma without complication - rare rescue inhaler albuterol sulfate HFA (PROAIR HFA) 108 (90 Base) MCG/ACT inhaler   3. Hypercholesterolemia LDL goal < 160     4. Hyperglycemia     5.  Adjustment disorder with anxious mood  buPROPion [facility] SR) 150 MG extended release tablet   6. Osteoporosis- DXA -3.2 (11/14), -2.9 (8/11), -3.3 (9/08)     7. Pulmonary emphysema, unspecified emphysema type (HCC)  theophylline (UNIPHYL) 400 MG extended release tablet    fluticasone-salmeterol (ADVAIR DISKUS) 250-50 MCG/DOSE AEPB    tiotropium (SPIRIVA HANDIHALER) 18 MCG inhalation capsule    albuterol sulfate HFA (PROAIR HFA) 108 (90 Base) MCG/ACT inhaler   8. Venous insufficiency of both lower extremities  furosemide (LASIX) 20 MG tablet   9. Irritable bowel syndrome with diarrhea  dicyclomine (BENTYL) 20 MG tablet   10. Primary osteoarthritis, unspecified site  nabumetone (RELAFEN) 750 MG tablet   Stable. Continue current Tx plan except for changes marked below. INSTRUCTIONS  NEXT APPOINTMENT: Please schedule fasting annual physical (30 minutes) in 6 months. OK to have water, black coffee and medications (except for diabetes medicines). · PLEASE TAKE THIS FORM TO CHECK-OUT WINDOW TO SCHEDULE NEXT VISIT. · Please get flu vaccine when available in fall. Can get either at this office or at stores such as [email or web address] and [email or web address].

## 2019-04-16 NOTE — PATIENT INSTRUCTIONS
INSTRUCTIONS  NEXT APPOINTMENT: Please schedule fasting annual physical (30 minutes) in 6 months. OK to have water, black coffee and medications (except for diabetes medicines). · PLEASE TAKE THIS FORM TO CHECK-OUT WINDOW TO SCHEDULE NEXT VISIT. · Please get flu vaccine when available in fall. Can get either at this office or at stores such as [employer].

## 2019-04-24 RX ORDER — TIZANIDINE 4 MG/1
TABLET ORAL
Qty: 90 TABLET | Refills: 0 | Status: SHIPPED | OUTPATIENT
Start: 2019-04-24 | End: 2019-06-04 | Stop reason: SDUPTHER

## 2019-05-06 DIAGNOSIS — F43.22 ADJUSTMENT DISORDER WITH ANXIOUS MOOD: ICD-10-CM

## 2019-05-07 DIAGNOSIS — I10 ESSENTIAL HYPERTENSION: ICD-10-CM

## 2019-05-08 RX ORDER — METOPROLOL SUCCINATE 100 MG/1
100 TABLET, EXTENDED RELEASE ORAL DAILY
Qty: 30 TABLET | Refills: 5 | Status: SHIPPED | OUTPATIENT
Start: 2019-05-08 | End: 2019-09-04 | Stop reason: SDUPTHER

## 2019-05-08 RX ORDER — BUPROPION HYDROCHLORIDE 150 MG/1
150 TABLET, EXTENDED RELEASE ORAL DAILY
Qty: 90 TABLET | Refills: 1 | Status: SHIPPED | OUTPATIENT
Start: 2019-05-08 | End: 2019-12-02

## 2019-06-06 RX ORDER — TIZANIDINE 4 MG/1
TABLET ORAL
Qty: 90 TABLET | Refills: 0 | Status: SHIPPED | OUTPATIENT
Start: 2019-06-06 | End: 2019-07-10 | Stop reason: SDUPTHER

## 2019-07-11 RX ORDER — TIZANIDINE 4 MG/1
TABLET ORAL
Qty: 90 TABLET | Refills: 2 | Status: SHIPPED | OUTPATIENT
Start: 2019-07-11 | End: 2019-11-06 | Stop reason: SDUPTHER

## 2019-09-04 DIAGNOSIS — I10 ESSENTIAL HYPERTENSION: ICD-10-CM

## 2019-09-04 RX ORDER — METOPROLOL SUCCINATE 100 MG/1
100 TABLET, EXTENDED RELEASE ORAL DAILY
Qty: 90 TABLET | Refills: 0 | Status: SHIPPED | OUTPATIENT
Start: 2019-09-04 | End: 2019-10-11 | Stop reason: SDUPTHER

## 2019-09-24 RX ORDER — DICYCLOMINE HCL 20 MG
TABLET ORAL
Qty: 360 TABLET | Refills: 0 | Status: SHIPPED | OUTPATIENT
Start: 2019-09-24 | End: 2019-12-30

## 2019-10-11 DIAGNOSIS — I10 ESSENTIAL HYPERTENSION: ICD-10-CM

## 2019-10-11 DIAGNOSIS — J43.9 PULMONARY EMPHYSEMA, UNSPECIFIED EMPHYSEMA TYPE (HCC): ICD-10-CM

## 2019-10-14 RX ORDER — THEOPHYLLINE 400 MG/1
TABLET, EXTENDED RELEASE ORAL
Qty: 90 TABLET | Refills: 0 | Status: SHIPPED | OUTPATIENT
Start: 2019-10-14 | End: 2020-01-08 | Stop reason: SDUPTHER

## 2019-10-14 RX ORDER — METOPROLOL SUCCINATE 100 MG/1
100 TABLET, EXTENDED RELEASE ORAL DAILY
Qty: 90 TABLET | Refills: 0 | Status: SHIPPED | OUTPATIENT
Start: 2019-10-14 | End: 2019-12-02

## 2019-10-23 DIAGNOSIS — J43.9 PULMONARY EMPHYSEMA, UNSPECIFIED EMPHYSEMA TYPE (HCC): ICD-10-CM

## 2019-10-29 ENCOUNTER — OFFICE VISIT (OUTPATIENT)
Dept: FAMILY MEDICINE CLINIC | Age: 79
End: 2019-10-29
Payer: MEDICARE

## 2019-10-29 VITALS
HEIGHT: 67 IN | WEIGHT: 152 LBS | DIASTOLIC BLOOD PRESSURE: 74 MMHG | HEART RATE: 80 BPM | RESPIRATION RATE: 16 BRPM | SYSTOLIC BLOOD PRESSURE: 126 MMHG | BODY MASS INDEX: 23.86 KG/M2

## 2019-10-29 DIAGNOSIS — E78.00 HYPERCHOLESTEROLEMIA: ICD-10-CM

## 2019-10-29 DIAGNOSIS — M81.0 AGE-RELATED OSTEOPOROSIS WITHOUT CURRENT PATHOLOGICAL FRACTURE: ICD-10-CM

## 2019-10-29 DIAGNOSIS — Z00.00 ROUTINE GENERAL MEDICAL EXAMINATION AT A HEALTH CARE FACILITY: Primary | ICD-10-CM

## 2019-10-29 DIAGNOSIS — I10 ESSENTIAL HYPERTENSION: ICD-10-CM

## 2019-10-29 DIAGNOSIS — J45.41 MODERATE PERSISTENT ASTHMA WITH ACUTE EXACERBATION: ICD-10-CM

## 2019-10-29 DIAGNOSIS — Z23 NEEDS FLU SHOT: ICD-10-CM

## 2019-10-29 DIAGNOSIS — M19.91 PRIMARY OSTEOARTHRITIS, UNSPECIFIED SITE: ICD-10-CM

## 2019-10-29 DIAGNOSIS — J43.9 PULMONARY EMPHYSEMA, UNSPECIFIED EMPHYSEMA TYPE (HCC): ICD-10-CM

## 2019-10-29 DIAGNOSIS — F43.22 ADJUSTMENT DISORDER WITH ANXIOUS MOOD: ICD-10-CM

## 2019-10-29 LAB
A/G RATIO: 1.7 (ref 1.1–2.2)
ALBUMIN SERPL-MCNC: 4.6 G/DL (ref 3.4–5)
ALP BLD-CCNC: 134 U/L (ref 40–129)
ALT SERPL-CCNC: 14 U/L (ref 10–40)
ANION GAP SERPL CALCULATED.3IONS-SCNC: 18 MMOL/L (ref 3–16)
AST SERPL-CCNC: 16 U/L (ref 15–37)
BILIRUB SERPL-MCNC: <0.2 MG/DL (ref 0–1)
BUN BLDV-MCNC: 22 MG/DL (ref 7–20)
CALCIUM SERPL-MCNC: 10.3 MG/DL (ref 8.3–10.6)
CHLORIDE BLD-SCNC: 97 MMOL/L (ref 99–110)
CHOLESTEROL, TOTAL: 186 MG/DL (ref 0–199)
CO2: 28 MMOL/L (ref 21–32)
CREAT SERPL-MCNC: 1.2 MG/DL (ref 0.6–1.2)
GFR AFRICAN AMERICAN: 52
GFR NON-AFRICAN AMERICAN: 43
GLOBULIN: 2.7 G/DL
GLUCOSE BLD-MCNC: 109 MG/DL (ref 70–99)
HDLC SERPL-MCNC: 75 MG/DL (ref 40–60)
LDL CHOLESTEROL CALCULATED: 83 MG/DL
POTASSIUM SERPL-SCNC: 3.4 MMOL/L (ref 3.5–5.1)
SODIUM BLD-SCNC: 143 MMOL/L (ref 136–145)
TOTAL PROTEIN: 7.3 G/DL (ref 6.4–8.2)
TRIGL SERPL-MCNC: 140 MG/DL (ref 0–150)
VLDLC SERPL CALC-MCNC: 28 MG/DL

## 2019-10-29 PROCEDURE — 99214 OFFICE O/P EST MOD 30 MIN: CPT | Performed by: FAMILY MEDICINE

## 2019-10-29 PROCEDURE — 3023F SPIROM DOC REV: CPT | Performed by: FAMILY MEDICINE

## 2019-10-29 PROCEDURE — G8427 DOCREV CUR MEDS BY ELIG CLIN: HCPCS | Performed by: FAMILY MEDICINE

## 2019-10-29 PROCEDURE — 4004F PT TOBACCO SCREEN RCVD TLK: CPT | Performed by: FAMILY MEDICINE

## 2019-10-29 PROCEDURE — G8420 CALC BMI NORM PARAMETERS: HCPCS | Performed by: FAMILY MEDICINE

## 2019-10-29 PROCEDURE — 1123F ACP DISCUSS/DSCN MKR DOCD: CPT | Performed by: FAMILY MEDICINE

## 2019-10-29 PROCEDURE — G0008 ADMIN INFLUENZA VIRUS VAC: HCPCS | Performed by: FAMILY MEDICINE

## 2019-10-29 PROCEDURE — G8926 SPIRO NO PERF OR DOC: HCPCS | Performed by: FAMILY MEDICINE

## 2019-10-29 PROCEDURE — 90653 IIV ADJUVANT VACCINE IM: CPT | Performed by: FAMILY MEDICINE

## 2019-10-29 PROCEDURE — G0439 PPPS, SUBSEQ VISIT: HCPCS | Performed by: FAMILY MEDICINE

## 2019-10-29 PROCEDURE — G8399 PT W/DXA RESULTS DOCUMENT: HCPCS | Performed by: FAMILY MEDICINE

## 2019-10-29 PROCEDURE — 4040F PNEUMOC VAC/ADMIN/RCVD: CPT | Performed by: FAMILY MEDICINE

## 2019-10-29 PROCEDURE — G8482 FLU IMMUNIZE ORDER/ADMIN: HCPCS | Performed by: FAMILY MEDICINE

## 2019-10-29 PROCEDURE — 1090F PRES/ABSN URINE INCON ASSESS: CPT | Performed by: FAMILY MEDICINE

## 2019-10-29 RX ORDER — AZITHROMYCIN 250 MG/1
250 TABLET, FILM COATED ORAL SEE ADMIN INSTRUCTIONS
Qty: 6 TABLET | Refills: 0 | Status: SHIPPED | OUTPATIENT
Start: 2019-10-29 | End: 2019-11-03

## 2019-10-29 RX ORDER — PREDNISONE 20 MG/1
20 TABLET ORAL 2 TIMES DAILY
Qty: 10 TABLET | Refills: 0 | Status: SHIPPED | OUTPATIENT
Start: 2019-10-29 | End: 2019-11-03

## 2019-10-29 ASSESSMENT — LIFESTYLE VARIABLES
HOW MANY STANDARD DRINKS CONTAINING ALCOHOL DO YOU HAVE ON A TYPICAL DAY: 0
HOW OFTEN DURING THE LAST YEAR HAVE YOU FOUND THAT YOU WERE NOT ABLE TO STOP DRINKING ONCE YOU HAD STARTED: 0
HOW OFTEN DO YOU HAVE A DRINK CONTAINING ALCOHOL: 1
HOW OFTEN DO YOU HAVE SIX OR MORE DRINKS ON ONE OCCASION: 0
AUDIT-C TOTAL SCORE: 1
HOW OFTEN DURING THE LAST YEAR HAVE YOU BEEN UNABLE TO REMEMBER WHAT HAPPENED THE NIGHT BEFORE BECAUSE YOU HAD BEEN DRINKING: 0
HOW OFTEN DURING THE LAST YEAR HAVE YOU HAD A FEELING OF GUILT OR REMORSE AFTER DRINKING: 0
HAVE YOU OR SOMEONE ELSE BEEN INJURED AS A RESULT OF YOUR DRINKING: 0
HAS A RELATIVE, FRIEND, DOCTOR, OR ANOTHER HEALTH PROFESSIONAL EXPRESSED CONCERN ABOUT YOUR DRINKING OR SUGGESTED YOU CUT DOWN: 0
HOW OFTEN DURING THE LAST YEAR HAVE YOU FAILED TO DO WHAT WAS NORMALLY EXPECTED FROM YOU BECAUSE OF DRINKING: 0
AUDIT TOTAL SCORE: 1
HOW OFTEN DURING THE LAST YEAR HAVE YOU NEEDED AN ALCOHOLIC DRINK FIRST THING IN THE MORNING TO GET YOURSELF GOING AFTER A NIGHT OF HEAVY DRINKING: 0

## 2019-10-29 ASSESSMENT — PATIENT HEALTH QUESTIONNAIRE - PHQ9
SUM OF ALL RESPONSES TO PHQ QUESTIONS 1-9: 0
SUM OF ALL RESPONSES TO PHQ QUESTIONS 1-9: 0

## 2019-11-05 ENCOUNTER — TELEPHONE (OUTPATIENT)
Dept: FAMILY MEDICINE CLINIC | Age: 79
End: 2019-11-05

## 2019-12-02 DIAGNOSIS — F43.22 ADJUSTMENT DISORDER WITH ANXIOUS MOOD: ICD-10-CM

## 2019-12-02 DIAGNOSIS — M19.91 PRIMARY OSTEOARTHRITIS, UNSPECIFIED SITE: ICD-10-CM

## 2019-12-02 DIAGNOSIS — I10 ESSENTIAL HYPERTENSION: ICD-10-CM

## 2019-12-02 DIAGNOSIS — J43.9 PULMONARY EMPHYSEMA, UNSPECIFIED EMPHYSEMA TYPE (HCC): ICD-10-CM

## 2019-12-02 RX ORDER — TIZANIDINE 4 MG/1
TABLET ORAL
Qty: 90 TABLET | Refills: 0 | Status: SHIPPED | OUTPATIENT
Start: 2019-12-02 | End: 2020-02-14 | Stop reason: SDUPTHER

## 2019-12-02 RX ORDER — METOPROLOL SUCCINATE 100 MG/1
100 TABLET, EXTENDED RELEASE ORAL DAILY
Qty: 90 TABLET | Refills: 1 | Status: SHIPPED | OUTPATIENT
Start: 2019-12-02 | End: 2020-08-19

## 2019-12-02 RX ORDER — NABUMETONE 750 MG/1
TABLET, FILM COATED ORAL
Qty: 90 TABLET | Refills: 1 | Status: SHIPPED | OUTPATIENT
Start: 2019-12-02 | End: 2020-06-05

## 2019-12-02 RX ORDER — BUPROPION HYDROCHLORIDE 150 MG/1
150 TABLET, EXTENDED RELEASE ORAL DAILY
Qty: 90 TABLET | Refills: 1 | Status: SHIPPED | OUTPATIENT
Start: 2019-12-02 | End: 2020-05-28

## 2019-12-30 DIAGNOSIS — I10 ESSENTIAL HYPERTENSION: ICD-10-CM

## 2019-12-30 RX ORDER — HYDROCHLOROTHIAZIDE 25 MG/1
25 TABLET ORAL EVERY MORNING
Qty: 90 TABLET | Refills: 1 | Status: SHIPPED | OUTPATIENT
Start: 2019-12-30 | End: 2020-03-30 | Stop reason: SDUPTHER

## 2019-12-30 RX ORDER — DICYCLOMINE HCL 20 MG
TABLET ORAL
Qty: 360 TABLET | Refills: 0 | Status: SHIPPED | OUTPATIENT
Start: 2019-12-30 | End: 2021-04-02

## 2020-01-07 ENCOUNTER — TELEPHONE (OUTPATIENT)
Dept: FAMILY MEDICINE CLINIC | Age: 80
End: 2020-01-07

## 2020-01-07 NOTE — TELEPHONE ENCOUNTER
Pt is calling to say that she needs a refill on this GHEOPHLYLLINE 400 MG ER    Please advise     Thanks.

## 2020-01-08 RX ORDER — THEOPHYLLINE 400 MG/1
TABLET, EXTENDED RELEASE ORAL
Qty: 90 TABLET | Refills: 1 | Status: SHIPPED | OUTPATIENT
Start: 2020-01-08 | End: 2020-07-07

## 2020-01-08 RX ORDER — DILTIAZEM HYDROCHLORIDE 120 MG/1
CAPSULE, COATED, EXTENDED RELEASE ORAL
Qty: 180 CAPSULE | Refills: 1 | Status: SHIPPED | OUTPATIENT
Start: 2020-01-08 | End: 2020-07-07

## 2020-02-14 RX ORDER — TIZANIDINE 4 MG/1
TABLET ORAL
Qty: 90 TABLET | Refills: 0 | Status: SHIPPED | OUTPATIENT
Start: 2020-02-14 | End: 2020-03-30

## 2020-03-30 RX ORDER — TIZANIDINE 4 MG/1
TABLET ORAL
Qty: 90 TABLET | Refills: 0 | Status: SHIPPED | OUTPATIENT
Start: 2020-03-30 | End: 2020-04-30

## 2020-03-30 RX ORDER — FUROSEMIDE 20 MG/1
TABLET ORAL
Qty: 90 TABLET | Refills: 0 | Status: SHIPPED | OUTPATIENT
Start: 2020-03-30 | End: 2020-06-30

## 2020-03-30 RX ORDER — HYDROCHLOROTHIAZIDE 25 MG/1
25 TABLET ORAL EVERY MORNING
Qty: 90 TABLET | Refills: 0 | Status: SHIPPED | OUTPATIENT
Start: 2020-03-30 | End: 2020-10-16

## 2020-03-30 RX ORDER — TRAMADOL HYDROCHLORIDE 50 MG/1
50 TABLET ORAL EVERY 8 HOURS PRN
Qty: 90 TABLET | Refills: 0 | Status: SHIPPED | OUTPATIENT
Start: 2020-03-30 | End: 2020-10-22 | Stop reason: SDUPTHER

## 2020-03-30 RX ORDER — TRAMADOL HYDROCHLORIDE 50 MG/1
TABLET ORAL
Qty: 90 TABLET | Refills: 0 | Status: CANCELLED | OUTPATIENT
Start: 2020-03-30 | End: 2020-04-29

## 2020-04-06 ENCOUNTER — TELEPHONE (OUTPATIENT)
Dept: FAMILY MEDICINE CLINIC | Age: 80
End: 2020-04-06

## 2020-04-06 RX ORDER — ALBUTEROL SULFATE 90 UG/1
2 AEROSOL, METERED RESPIRATORY (INHALATION) EVERY 4 HOURS PRN
Qty: 8.5 G | Refills: 0 | Status: SHIPPED | OUTPATIENT
Start: 2020-04-06 | End: 2020-11-10

## 2020-04-06 NOTE — TELEPHONE ENCOUNTER
Can you see if pt can do VV tmrw to address her concerns?   Thank you  OK to schedule with Zoila Dillon

## 2020-04-08 ENCOUNTER — OFFICE VISIT (OUTPATIENT)
Dept: FAMILY MEDICINE CLINIC | Age: 80
End: 2020-04-08

## 2020-04-30 RX ORDER — TIZANIDINE 4 MG/1
TABLET ORAL
Qty: 90 TABLET | Refills: 1 | Status: SHIPPED | OUTPATIENT
Start: 2020-04-30 | End: 2020-07-07

## 2020-05-04 ENCOUNTER — VIRTUAL VISIT (OUTPATIENT)
Dept: FAMILY MEDICINE CLINIC | Age: 80
End: 2020-05-04
Payer: MEDICARE

## 2020-05-04 PROCEDURE — 99442 PR PHYS/QHP TELEPHONE EVALUATION 11-20 MIN: CPT | Performed by: FAMILY MEDICINE

## 2020-05-04 NOTE — PROGRESS NOTES
aspirin 81 MG EC tablet Take 81 mg by mouth daily. Historical Provider, MD   calcium-vitamin D (OSCAL) 250-125 MG-UNIT per tablet Take 1 tablet by mouth daily. Historical Provider, MD   Simethicone (GAS-X EXTRA STRENGTH) 125 MG CAPS Take 1-4 capsules by mouth daily. Historical Provider, MD   diphenhydrAMINE (BENADRYL) 25 MG tablet Take 25 mg by mouth 2 times daily. Historical Provider, MD      Family History   Problem Relation Age of Onset    Diabetes Mother     Cancer Sister     Coronary Art Dis Sister     Coronary Art Dis Brother     Stroke Brother      Social History     Tobacco Use    Smoking status: Current Every Day Smoker     Packs/day: 0.50     Years: 65.00     Pack years: 32.50     Types: Cigarettes    Smokeless tobacco: Never Used    Tobacco comment: Advised to quit repeatedly, not interested   Substance Use Topics    Alcohol use: Yes     Comment: rare    Drug use: No      LAST LABS  Cholesterol, Total   Date Value Ref Range Status   10/29/2019 186 0 - 199 mg/dL Final     LDL Calculated   Date Value Ref Range Status   10/29/2019 83 <100 mg/dL Final     HDL   Date Value Ref Range Status   10/29/2019 75 (H) 40 - 60 mg/dL Final   03/20/2010 60 40 - 60 mg/dl Final     Triglycerides   Date Value Ref Range Status   10/29/2019 140 0 - 150 mg/dL Final     Lab Results   Component Value Date    GLUCOSE 109 (H) 10/29/2019     Lab Results   Component Value Date     10/29/2019    K 3.4 (L) 10/29/2019    CREATININE 1.2 10/29/2019     Lab Results   Component Value Date    WBC 8.9 08/20/2018    HGB 13.9 08/20/2018    HCT 41.4 08/20/2018    MCV 95.9 08/20/2018     08/20/2018     Lab Results   Component Value Date    ALT 14 10/29/2019    AST 16 10/29/2019    ALKPHOS 134 (H) 10/29/2019    BILITOT <0.2 10/29/2019     TSH (uIU/mL)   Date Value   08/20/2018 1.52     Lab Results   Component Value Date    LABA1C 4.9 08/20/2018      Assessment and Plan:      Diagnosis Orders   1.  Essential

## 2020-05-28 RX ORDER — BUPROPION HYDROCHLORIDE 150 MG/1
TABLET, EXTENDED RELEASE ORAL
Qty: 90 TABLET | Refills: 0 | Status: SHIPPED | OUTPATIENT
Start: 2020-05-28 | End: 2020-08-19

## 2020-06-05 RX ORDER — NABUMETONE 750 MG/1
TABLET, FILM COATED ORAL
Qty: 90 TABLET | Refills: 1 | Status: SHIPPED | OUTPATIENT
Start: 2020-06-05 | End: 2020-12-09

## 2020-06-30 RX ORDER — TIOTROPIUM BROMIDE 18 UG/1
CAPSULE ORAL; RESPIRATORY (INHALATION)
Qty: 90 CAPSULE | Refills: 0 | OUTPATIENT
Start: 2020-06-30

## 2020-06-30 RX ORDER — FUROSEMIDE 20 MG/1
TABLET ORAL
Qty: 90 TABLET | Refills: 0 | Status: SHIPPED | OUTPATIENT
Start: 2020-06-30 | End: 2020-10-16

## 2020-07-07 RX ORDER — DILTIAZEM HYDROCHLORIDE 120 MG/1
CAPSULE, COATED, EXTENDED RELEASE ORAL
Qty: 180 CAPSULE | Refills: 1 | Status: SHIPPED | OUTPATIENT
Start: 2020-07-07 | End: 2020-12-30

## 2020-07-07 RX ORDER — TIZANIDINE 4 MG/1
TABLET ORAL
Qty: 90 TABLET | Refills: 2 | Status: SHIPPED | OUTPATIENT
Start: 2020-07-07 | End: 2020-11-10

## 2020-07-07 RX ORDER — THEOPHYLLINE 400 MG/1
TABLET, EXTENDED RELEASE ORAL
Qty: 90 TABLET | Refills: 1 | Status: SHIPPED | OUTPATIENT
Start: 2020-07-07 | End: 2020-12-29

## 2020-10-06 ENCOUNTER — HOSPITAL ENCOUNTER (EMERGENCY)
Age: 80
Discharge: HOME | End: 2020-10-07
Payer: MEDICARE

## 2020-10-06 ENCOUNTER — APPOINTMENT (OUTPATIENT)
Dept: CT IMAGING | Age: 80
End: 2020-10-06
Payer: MEDICARE

## 2020-10-06 LAB
ANION GAP SERPL CALCULATED.3IONS-SCNC: 16 MMOL/L (ref 3–16)
BACTERIA: ABNORMAL /HPF
BASOPHILS ABSOLUTE: 0 K/UL (ref 0–0.2)
BASOPHILS RELATIVE PERCENT: 0.3 %
BILIRUBIN URINE: NEGATIVE
BLOOD, URINE: ABNORMAL
BUN BLDV-MCNC: 25 MG/DL (ref 7–20)
CALCIUM SERPL-MCNC: 10 MG/DL (ref 8.3–10.6)
CHLORIDE BLD-SCNC: 92 MMOL/L (ref 99–110)
CLARITY: ABNORMAL
CO2: 25 MMOL/L (ref 21–32)
COLOR: ABNORMAL
COMMENT UA: ABNORMAL
CREAT SERPL-MCNC: 1.2 MG/DL (ref 0.6–1.2)
EOSINOPHILS ABSOLUTE: 0 K/UL (ref 0–0.6)
EOSINOPHILS RELATIVE PERCENT: 0.5 %
EPITHELIAL CELLS, UA: 2 /HPF (ref 0–5)
GFR AFRICAN AMERICAN: 52
GFR NON-AFRICAN AMERICAN: 43
GLUCOSE BLD-MCNC: 104 MG/DL (ref 70–99)
GLUCOSE URINE: NEGATIVE MG/DL
HCT VFR BLD CALC: 35.7 % (ref 36–48)
HEMATOLOGY PATH CONSULT: NO
HEMOGLOBIN: 12.2 G/DL (ref 12–16)
KETONES, URINE: ABNORMAL MG/DL
LEUKOCYTE ESTERASE, URINE: ABNORMAL
LYMPHOCYTES ABSOLUTE: 1.2 K/UL (ref 1–5.1)
LYMPHOCYTES RELATIVE PERCENT: 12.2 %
MCH RBC QN AUTO: 32.4 PG (ref 26–34)
MCHC RBC AUTO-ENTMCNC: 34.1 G/DL (ref 31–36)
MCV RBC AUTO: 95 FL (ref 80–100)
MICROSCOPIC EXAMINATION: YES
MONOCYTES ABSOLUTE: 1.7 K/UL (ref 0–1.3)
MONOCYTES RELATIVE PERCENT: 17.5 %
NEUTROPHILS ABSOLUTE: 6.8 K/UL (ref 1.7–7.7)
NEUTROPHILS RELATIVE PERCENT: 69.5 %
NITRITE, URINE: POSITIVE
PDW BLD-RTO: 15.1 % (ref 12.4–15.4)
PH UA: 6 (ref 5–8)
PLATELET # BLD: 298 K/UL (ref 135–450)
PMV BLD AUTO: 8.1 FL (ref 5–10.5)
POTASSIUM SERPL-SCNC: 4 MMOL/L (ref 3.5–5.1)
PROTEIN UA: >=300 MG/DL
RBC # BLD: 3.76 M/UL (ref 4–5.2)
RBC UA: 53 /HPF (ref 0–4)
SODIUM BLD-SCNC: 133 MMOL/L (ref 136–145)
SPECIFIC GRAVITY UA: 1.02 (ref 1–1.03)
URINE REFLEX TO CULTURE: YES
URINE TYPE: ABNORMAL
UROBILINOGEN, URINE: 1 E.U./DL
WBC # BLD: 9.8 K/UL (ref 4–11)
WBC UA: >900 /HPF (ref 0–5)

## 2020-10-06 PROCEDURE — 87186 SC STD MICRODIL/AGAR DIL: CPT

## 2020-10-06 PROCEDURE — 36415 COLL VENOUS BLD VENIPUNCTURE: CPT

## 2020-10-06 PROCEDURE — 2580000003 HC RX 258: Performed by: PHYSICIAN ASSISTANT

## 2020-10-06 PROCEDURE — 99283 EMERGENCY DEPT VISIT LOW MDM: CPT

## 2020-10-06 PROCEDURE — 81001 URINALYSIS AUTO W/SCOPE: CPT

## 2020-10-06 PROCEDURE — 72128 CT CHEST SPINE W/O DYE: CPT

## 2020-10-06 PROCEDURE — 87088 URINE BACTERIA CULTURE: CPT

## 2020-10-06 PROCEDURE — 87086 URINE CULTURE/COLONY COUNT: CPT

## 2020-10-06 PROCEDURE — 87077 CULTURE AEROBIC IDENTIFY: CPT

## 2020-10-06 PROCEDURE — 80048 BASIC METABOLIC PNL TOTAL CA: CPT

## 2020-10-06 PROCEDURE — 6360000002 HC RX W HCPCS: Performed by: PHYSICIAN ASSISTANT

## 2020-10-06 PROCEDURE — 6370000000 HC RX 637 (ALT 250 FOR IP): Performed by: PHYSICIAN ASSISTANT

## 2020-10-06 PROCEDURE — 85025 COMPLETE CBC W/AUTO DIFF WBC: CPT

## 2020-10-06 PROCEDURE — 96365 THER/PROPH/DIAG IV INF INIT: CPT

## 2020-10-06 RX ORDER — OXYCODONE HYDROCHLORIDE AND ACETAMINOPHEN 5; 325 MG/1; MG/1
1 TABLET ORAL ONCE
Status: COMPLETED | OUTPATIENT
Start: 2020-10-06 | End: 2020-10-06

## 2020-10-06 RX ADMIN — CEFTRIAXONE 1 G: 1 INJECTION, POWDER, FOR SOLUTION INTRAMUSCULAR; INTRAVENOUS at 23:16

## 2020-10-06 RX ADMIN — OXYCODONE HYDROCHLORIDE AND ACETAMINOPHEN 1 TABLET: 5; 325 TABLET ORAL at 20:39

## 2020-10-06 ASSESSMENT — PAIN DESCRIPTION - DESCRIPTORS: DESCRIPTORS: SQUEEZING

## 2020-10-06 ASSESSMENT — PAIN DESCRIPTION - LOCATION: LOCATION: BACK

## 2020-10-06 ASSESSMENT — PAIN SCALES - GENERAL
PAINLEVEL_OUTOF10: 10
PAINLEVEL_OUTOF10: 3
PAINLEVEL_OUTOF10: 10

## 2020-10-06 ASSESSMENT — PAIN DESCRIPTION - ORIENTATION: ORIENTATION: MID;UPPER

## 2020-10-07 VITALS
OXYGEN SATURATION: 95 % | HEART RATE: 70 BPM | TEMPERATURE: 98 F | HEIGHT: 67 IN | RESPIRATION RATE: 16 BRPM | DIASTOLIC BLOOD PRESSURE: 77 MMHG | BODY MASS INDEX: 23.81 KG/M2 | SYSTOLIC BLOOD PRESSURE: 118 MMHG

## 2020-10-07 RX ORDER — CEFUROXIME AXETIL 250 MG/1
250 TABLET ORAL 2 TIMES DAILY
Qty: 10 TABLET | Refills: 0 | Status: SHIPPED | OUTPATIENT
Start: 2020-10-07 | End: 2020-10-12

## 2020-10-07 RX ORDER — TIZANIDINE 2 MG/1
2 TABLET ORAL EVERY 6 HOURS PRN
Qty: 20 TABLET | Refills: 0 | Status: ON HOLD | OUTPATIENT
Start: 2020-10-07 | End: 2020-11-25 | Stop reason: SDUPTHER

## 2020-10-07 RX ORDER — OXYCODONE HYDROCHLORIDE AND ACETAMINOPHEN 5; 325 MG/1; MG/1
1 TABLET ORAL EVERY 6 HOURS PRN
Qty: 10 TABLET | Refills: 0 | Status: SHIPPED | OUTPATIENT
Start: 2020-10-07 | End: 2020-10-10

## 2020-10-07 ASSESSMENT — ENCOUNTER SYMPTOMS
SHORTNESS OF BREATH: 0
EYE REDNESS: 0
BACK PAIN: 1
FACIAL SWELLING: 0
EYE DISCHARGE: 0
SORE THROAT: 0
CHOKING: 0
VOMITING: 0
APNEA: 0
ABDOMINAL PAIN: 0
NAUSEA: 0

## 2020-10-07 NOTE — ED TRIAGE NOTES
States she states she has a pulled muscle in chest and upper back. States she thinks she strained herself while flipping the sheets on her bed. States pain when she takes a deep breath.

## 2020-10-07 NOTE — ED PROVIDER NOTES
**ADVANCED PRACTICE PROVIDER, I HAVE EVALUATED THIS PATIENT**        [address] [facility] ENCOUNTER      Pt Name: Regis Hager  WRW:[id number]  [place] 1940  Date of evaluation: 10/6/2020  Provider: Ron Israel PA-C      Chief Complaint:    Chief Complaint   Patient presents with    Back Pain     started yesterday when trying to make her bed; hurts to take a deep breath in between her shoulder blades       Nursing Notes, Past Medical Hx, Past Surgical Hx, Social Hx, Allergies, and Family Hx were all reviewed and agreed with or any disagreements were addressed in the HPI.    HPI:  (Location, Duration, Timing, Severity, Quality, Assoc Sx, Context, Modifying factors)  This is a  78 y.o. female complaint of mid back pain started yesterday when she was making her bed. Says all she did was twisted and she felt this sharp pain. No fall. No numbness or tingling feet or finger. No chest wall pain. No chest pain, no abdominal pain, no difficulty with ambulation. She is taken Tylenol with no relief. Pain is so bad she cannot sleep. No previous back injuries. No other complaints. PastMedical/Surgical History:      Diagnosis Date    HTN (hypertension)     Hypercholesterolemia     MAC (mycobacterium avium-intracellulare complex)     Osteoarthritis     Osteopetrosis     Papanicolaou smear of cervix with atypical squamous cells of undetermined significance (ASC-US) 1998    Shingles     Stress incontinence          Procedure Laterality Date    BREAST SURGERY      CATARACT REMOVAL Left 2009    CHOLECYSTECTOMY      INGUINAL HERNIA REPAIR Right 2015       Medications:  Previous Medications    ACETAMINOPHEN (TYLENOL) 500 MG TABLET    Take 500 mg by mouth 3 times daily as needed.       ALBUTEROL SULFATE HFA (PROAIR HFA) 108 (90 BASE) MCG/ACT INHALER    Inhale 2 puffs into the lungs every 4 hours as needed for Wheezing    ASPIRIN 81 MG EC TABLET Take 81 mg by mouth daily. BUPROPION (WELLBUTRIN SR) 150 MG EXTENDED RELEASE TABLET    TAKE ONE TABLET BY MOUTH DAILY    DICYCLOMINE (BENTYL) 20 MG TABLET    TAKE 1 TABLET BY MOUTH EVERY 6 HOURS    DILTIAZEM (CARDIZEM CD) 120 MG EXTENDED RELEASE CAPSULE    TAKE ONE CAPSULE BY MOUTH TWICE A DAY    DIPHENHYDRAMINE (BENADRYL) 25 MG TABLET    Take 25 mg by mouth 2 times daily. FLUTICASONE-SALMETEROL (WIXELA INHUB) 250-50 MCG/DOSE AEPB    INHALE 1 PUFF INTO THE LUNGS TWICE DAILY    FUROSEMIDE (LASIX) 20 MG TABLET    TAKE ONE TABLET BY MOUTH DAILY    HYDROCHLOROTHIAZIDE (HYDRODIURIL) 25 MG TABLET    Take 1 tablet by mouth every morning    METOPROLOL SUCCINATE (TOPROL XL) 100 MG EXTENDED RELEASE TABLET    Take 1 tablet by mouth daily *CALL FOR APPOINTMENT*    MOMETASONE (ELOCON) 0.1 % CREAM    Apply  topically 2 times daily. NABUMETONE (RELAFEN) 750 MG TABLET    TAKE ONE TABLET BY MOUTH DAILY    THEOPHYLLINE (UNIPHYL) 400 MG EXTENDED RELEASE TABLET    TAKE ONE-HALF TABLET BY MOUTH TWICE A DAY    TIOTROPIUM (SPIRIVA HANDIHALER) 18 MCG INHALATION CAPSULE    INHALE THE CONTENTS OF 1 CAPSULE INTO THE LUNGS DAILY VIA HANDIHALER    TIZANIDINE (ZANAFLEX) 4 MG TABLET    TAKE ONE TABLET BY MOUTH THREE TIMES A DAY AS NEEDED         Review of Systems:  Review of Systems   Constitutional: Negative for chills and fever. HENT: Negative for congestion, facial swelling and sore throat. Eyes: Negative for discharge and redness. Respiratory: Negative for apnea, choking and shortness of breath. Cardiovascular: Negative for chest pain. Gastrointestinal: Negative for abdominal pain, nausea and vomiting. Genitourinary: Negative for dysuria. Musculoskeletal: Positive for back pain. Negative for neck pain and neck stiffness. Neurological: Negative for dizziness, tremors, seizures, weakness and headaches. All other systems reviewed and are negative. Positives and Pertinent negatives as per HPI.   Except as 5' 7\" (1.702 m)        LABS:  Labs Reviewed   URINE RT REFLEX TO CULTURE - Abnormal; Notable for the following components:       Result Value    Clarity, UA TURBID (*)     Ketones, Urine TRACE (*)     Blood, Urine MODERATE (*)     Protein, UA >=300 (*)     Nitrite, Urine POSITIVE (*)     Leukocyte Esterase, Urine LARGE (*)     All other components within normal limits    Narrative:     Performed at:  [address] [facility] [address]   Phone [phone]   CBC WITH AUTO DIFFERENTIAL - Abnormal; Notable for the following components:    RBC 3.76 (*)     Hematocrit 35.7 (*)     Monocytes Absolute 1.7 (*)     All other components within normal limits    Narrative:     Performed at:  [address] [facility] [address]   Phone [phone]   BASIC METABOLIC PANEL - Abnormal; Notable for the following components:    Sodium 133 (*)     Chloride 92 (*)     Glucose 104 (*)     BUN 25 (*)     GFR Non- 43 (*)     GFR  52 (*)     All other components within normal limits    Narrative:     Performed at:  [address] [facility] [address]   Phone [phone]   MICROSCOPIC URINALYSIS - Abnormal; Notable for the following components:    Bacteria, UA 4+ (*)     WBC, UA >900 (*)     RBC, UA 53 (*)     All other components within normal limits    Narrative:     Performed at:  [address] [facility] [address]   Phone [phone]   CULTURE, URINE        Remainder of labs reviewed and werenegative at this time or not returned at the time of this note.     RADIOLOGY:   Non-plain film images such as CT, Ultrasound and MRI are read by the radiologist. Gomez Leonard PA-C have directly visualized the radiologic plain film image(s) with the below findings:        Interpretation per the Radiologist below, if available at the time of this note:    CT THORACIC SPINE WO CONTRAST   Final Result   70% compression fracture at T7 age indeterminate but irregularity involving   inferior endplate could suggest this is subacute in age. This is associated   with a spinous process fracture. Please correlate with exam findings. No results found. MEDICAL DECISION MAKING / ED COURSE:      PROCEDURES:   Procedures    None    Patient was given:  Medications   oxyCODONE-acetaminophen (PERCOCET) 5-325 MG per tablet 1 tablet (1 tablet Oral Given 10/6/20 2039)   cefTRIAXone (ROCEPHIN) 1 g IVPB in 50 mL D5W minibag (0 g Intravenous Stopped 10/6/20 2350)       Emergency room course: Patient on exam pupils equal round and reactive to light extraocular movement is intact. Throat is clear. Neck is supple full range of motion without midline tenderness. Patient has no midline tenderness cervical and lumbar spine. She does have mild tenderness to the mid thoracic spine with palpation. No step-off or crepitus noted. No swelling noted. Cardiovascular regular rhythm, lungs are clear. No wheeze, rales or rhonchi. Chest wall show no tenderness with palpation. Abdomen is soft nontender. Patient has good strength against resisted plantar dorsiflexion. No saddle anesthesia.  strength 5+ equal bilaterally. Alert oriented x4. Does not appear to be in acute distress. Lab results from today shows:  Urinalysis showed large leukocytes, positive nitrites, moderate blood trace of ketones. Microscopic urinalysis show bacteria 4+, WBC greater than 900, RBC 53 epithelial cells of 2. CBC shows white count of 9.8, RBC 3.76, hemoglobin 12.2 hematocrit 35.7. BMP shows sodium 133, potassium 4.0, chloride 92 BUN 25 creatinine 1.2. CT of thoracic spine shows a 70% compression fracture T7 age-indeterminate but irregularity involving the inferior endplate could suggest this is subacute in age.   This is associated with a spinous process fracture. Placed a call out to neurosurgery spoke with Dr. Michelle Delacruz and he recommended putting the patient in a TLSO back brace given her pain medication muscle relaxer and he will see her in follow-up. I did discuss this with the patient and her daughter and they were okay with this plan. She was given Rocephin IV 1 g for UTI. I will put her on Ceftin for home. Have her follow-up with her primary care physician in 1 week to have her urine checked to make sure the UTI has resolved or at least improved. Patient as well as daughter understood discharge plan she will be discharged stable condition. The patient tolerated their visit well. I evaluated the patient. The physician was available for consultation as needed. The patient and / or the family were informed of the results of any tests, a time was given to answer questions, a plan was proposed and they agreed with plan. CLINICAL IMPRESSION:  1. Closed wedge compression fracture of T7 vertebra[facility]    2. Urinary tract infection with hematuria, site unspecified        DISPOSITION  DISPOSITION Decision To Discharge 10/07/2020 01:30:52 AM          PATIENT REFERRED TO:  Apryl Frank MD  [address] [place] [address]  [phone]    Call   As needed    Lorenza Pizano MD  [address] [place] [address]  [phone]    Call in 1 day        DISCHARGE MEDICATIONS:  New Prescriptions    CEFUROXIME (CEFTIN) 250 MG TABLET    Take 1 tablet by mouth 2 times daily for 5 days    OXYCODONE-ACETAMINOPHEN (PERCOCET) 5-325 MG PER TABLET    Take 1 tablet by mouth every 6 hours as needed for Pain for up to 3 days.     TIZANIDINE (ZANAFLEX) 2 MG TABLET    Take 1 tablet by mouth every 6 hours as needed (Back pain and spasm)       DISCONTINUED MEDICATIONS:  Discontinued Medications    No medications on file              (Please note the MDM and HPI sections of this note were completed with a voice recognition program.  Efforts were made to edit the dictations but occasionally words are mis-transcribed.)    Electronically signed, Luis Cox PA-C,          Luis Cox PA-C  10/07/20 5162

## 2020-10-07 NOTE — ED NOTES
States no pain relief yet from pain med. States laying flat on CT table made it worse.      Melissa Luna RN  10/06/20 3170

## 2020-10-07 NOTE — ED NOTES
Report from [place], [facility].       Abril Mitchell RN  10/07/20 0611

## 2020-10-07 NOTE — ED NOTES
D/C: Order noted for d/c. Pt confirmed d/c paperwork does have correct name. Discharge and education instructions reviewed with patient. Teach-back successful. Pt verbalized understanding and signed d/c papers. Pt denied questions at this time. No acute distress noted. Patient instructed to follow-up as noted - return to emergency department if symptoms worsen. Patient verbalized understanding. Discharged per EDMD with discharge instructions. Pt discharged to private vehicle. Patient stable upon departure. Thanked patient for choosing [facility] for care. Provider aware of patient pain at time of discharge.      Moraima Townsend RN  10/07/20 0157

## 2020-10-08 ENCOUNTER — CARE COORDINATION (OUTPATIENT)
Dept: CARE COORDINATION | Age: 80
End: 2020-10-08

## 2020-10-08 LAB
ORGANISM: ABNORMAL
URINE CULTURE, ROUTINE: ABNORMAL

## 2020-10-08 NOTE — CARE COORDINATION
Outreach attempt regarding pt recent visit to the ED and also to enroll patient into the care management program. Pt was unable to reach; ACM left VM message with contact information.

## 2020-10-14 ENCOUNTER — CARE COORDINATION (OUTPATIENT)
Dept: CARE COORDINATION | Age: 80
End: 2020-10-14

## 2020-10-14 NOTE — CARE COORDINATION
while ACM out on PTO (10.12.20). ACM returned call to pt daughter, Elvin Rebolledo, today. Elvin Rebolledo reports her mother has a f/u appointment with the nurse practitioner in Dr. Hung Mabry office on 10.22.20. AC also informed Elvin Rebolledo that per the D/C instructions from the ED, pt was encouraged to call her neurologist on or around 10.8. 20. Elvin Rebolledo stated she doesn't think her mother did that. Elvin Rebolledo stated that her mother isn't doing too much for herself anymore and she is concerned that her mother may need to be evaluated for [address]. Elvin Rebolledo stated she is taking care of her daughter full time and she has less time to help with her mother. Elvin Rebolledo stated she would bring this up at pt visit on 10.22.20 and ACM informed her that would be a great start. [facility] also spoke with Elvin Rebolledo about the care management program and asked Elvin Rebolledo if CM could reach out to her mother. Elvin Rebolledo stated that she would like for her mother to enroll, but she thinks talking to her mother about it may increase her anxiety. She said they would like to talk about it further at the appointment. [facility] also explained that Benny Langston is the ACM for Dr. Hung Mabry office and provided her contact information for Rosario to have with further questions/concerns/resource needs. Elvin Rebolledo verbalized understanding and agreed to call Benny Langston as needed. ACM encouraged Elvin Kesha and/or patient to call with any questions/concerns.

## 2020-10-16 RX ORDER — HYDROCHLOROTHIAZIDE 25 MG/1
TABLET ORAL
Qty: 90 TABLET | Refills: 0 | Status: SHIPPED | OUTPATIENT
Start: 2020-10-16 | End: 2021-01-20

## 2020-10-16 RX ORDER — TIOTROPIUM BROMIDE 18 UG/1
CAPSULE ORAL; RESPIRATORY (INHALATION)
Qty: 90 CAPSULE | Refills: 0 | Status: SHIPPED | OUTPATIENT
Start: 2020-10-16

## 2020-10-16 RX ORDER — FUROSEMIDE 20 MG/1
TABLET ORAL
Qty: 90 TABLET | Refills: 0 | Status: SHIPPED | OUTPATIENT
Start: 2020-10-16 | End: 2021-01-20

## 2020-10-22 ENCOUNTER — TELEPHONE (OUTPATIENT)
Dept: ORTHOPEDIC SURGERY | Age: 80
End: 2020-10-22

## 2020-10-22 ENCOUNTER — OFFICE VISIT (OUTPATIENT)
Dept: FAMILY MEDICINE CLINIC | Age: 80
End: 2020-10-22
Payer: MEDICARE

## 2020-10-22 VITALS
RESPIRATION RATE: 14 BRPM | HEART RATE: 90 BPM | DIASTOLIC BLOOD PRESSURE: 80 MMHG | OXYGEN SATURATION: 95 % | SYSTOLIC BLOOD PRESSURE: 136 MMHG

## 2020-10-22 PROBLEM — S22.060A COMPRESSION FRACTURE OF T7 VERTEBRA (HCC): Status: ACTIVE | Noted: 2020-10-22

## 2020-10-22 PROBLEM — M81.0 SENILE OSTEOPOROSIS: Status: ACTIVE | Noted: 2020-10-22

## 2020-10-22 LAB
BILIRUBIN, POC: ABNORMAL
BLOOD URINE, POC: ABNORMAL
CLARITY, POC: CLEAR
COLOR, POC: YELLOW
GLUCOSE URINE, POC: ABNORMAL
KETONES, POC: ABNORMAL
LEUKOCYTE EST, POC: ABNORMAL
NITRITE, POC: ABNORMAL
PH, POC: 5.5
PROTEIN, POC: ABNORMAL
SPECIFIC GRAVITY, POC: 1.03
UROBILINOGEN, POC: 0.2

## 2020-10-22 PROCEDURE — G0008 ADMIN INFLUENZA VIRUS VAC: HCPCS | Performed by: NURSE PRACTITIONER

## 2020-10-22 PROCEDURE — G8420 CALC BMI NORM PARAMETERS: HCPCS | Performed by: NURSE PRACTITIONER

## 2020-10-22 PROCEDURE — G8427 DOCREV CUR MEDS BY ELIG CLIN: HCPCS | Performed by: NURSE PRACTITIONER

## 2020-10-22 PROCEDURE — 1123F ACP DISCUSS/DSCN MKR DOCD: CPT | Performed by: NURSE PRACTITIONER

## 2020-10-22 PROCEDURE — 81002 URINALYSIS NONAUTO W/O SCOPE: CPT | Performed by: NURSE PRACTITIONER

## 2020-10-22 PROCEDURE — 90694 VACC AIIV4 NO PRSRV 0.5ML IM: CPT | Performed by: NURSE PRACTITIONER

## 2020-10-22 PROCEDURE — G8510 SCR DEP NEG, NO PLAN REQD: HCPCS | Performed by: NURSE PRACTITIONER

## 2020-10-22 PROCEDURE — G8484 FLU IMMUNIZE NO ADMIN: HCPCS | Performed by: NURSE PRACTITIONER

## 2020-10-22 PROCEDURE — 1036F TOBACCO NON-USER: CPT | Performed by: NURSE PRACTITIONER

## 2020-10-22 PROCEDURE — 4040F PNEUMOC VAC/ADMIN/RCVD: CPT | Performed by: NURSE PRACTITIONER

## 2020-10-22 PROCEDURE — 99214 OFFICE O/P EST MOD 30 MIN: CPT | Performed by: NURSE PRACTITIONER

## 2020-10-22 PROCEDURE — G8399 PT W/DXA RESULTS DOCUMENT: HCPCS | Performed by: NURSE PRACTITIONER

## 2020-10-22 PROCEDURE — 1090F PRES/ABSN URINE INCON ASSESS: CPT | Performed by: NURSE PRACTITIONER

## 2020-10-22 RX ORDER — METHYLPREDNISOLONE SODIUM SUCCINATE 125 MG/2ML
125 INJECTION, POWDER, LYOPHILIZED, FOR SOLUTION INTRAMUSCULAR; INTRAVENOUS ONCE
Status: CANCELLED | OUTPATIENT
Start: 2020-10-22

## 2020-10-22 RX ORDER — DENOSUMAB 60 MG/ML
60 INJECTION SUBCUTANEOUS ONCE
Qty: 1 ML | Refills: 0 | Status: ON HOLD | OUTPATIENT
Start: 2020-10-22 | End: 2021-04-19 | Stop reason: ALTCHOICE

## 2020-10-22 RX ORDER — TRAMADOL HYDROCHLORIDE 50 MG/1
50 TABLET ORAL EVERY 8 HOURS PRN
Qty: 90 TABLET | Refills: 0 | Status: SHIPPED | OUTPATIENT
Start: 2020-10-22 | End: 2020-11-21

## 2020-10-22 RX ORDER — EPINEPHRINE 1 MG/ML
0.3 INJECTION, SOLUTION, CONCENTRATE INTRAVENOUS PRN
Status: CANCELLED | OUTPATIENT
Start: 2020-10-22

## 2020-10-22 RX ORDER — SODIUM CHLORIDE 9 MG/ML
INJECTION, SOLUTION INTRAVENOUS CONTINUOUS
Status: CANCELLED | OUTPATIENT
Start: 2020-10-22

## 2020-10-22 RX ORDER — DIPHENHYDRAMINE HYDROCHLORIDE 50 MG/ML
50 INJECTION INTRAMUSCULAR; INTRAVENOUS ONCE
Status: CANCELLED | OUTPATIENT
Start: 2020-10-22

## 2020-10-22 ASSESSMENT — PATIENT HEALTH QUESTIONNAIRE - PHQ9
SUM OF ALL RESPONSES TO PHQ9 QUESTIONS 1 & 2: 0
1. LITTLE INTEREST OR PLEASURE IN DOING THINGS: 0
SUM OF ALL RESPONSES TO PHQ QUESTIONS 1-9: 0
SUM OF ALL RESPONSES TO PHQ QUESTIONS 1-9: 0
2. FEELING DOWN, DEPRESSED OR HOPELESS: 0
SUM OF ALL RESPONSES TO PHQ QUESTIONS 1-9: 0

## 2020-10-22 ASSESSMENT — ENCOUNTER SYMPTOMS
NAUSEA: 0
CONSTIPATION: 0
ABDOMINAL PAIN: 0
DIARRHEA: 0
COUGH: 0
VOMITING: 0
SHORTNESS OF BREATH: 1

## 2020-10-22 NOTE — PROGRESS NOTES
10/22/2020    This is a 78 y.o. female   Chief Complaint   Patient presents with    Back Pain     f/u ED on 10/06 for back pain btwn shoulders   . HPI  Patient reports that she was making her bed on 10/5/20 and developed severe middle of back pain- more on the left side. Denies fall or trauma to the area. Went to ER for evaluation on 10/6/20. T7 compression fracture-   Wearing TLSO back brace- wears during the day. Has referral to Dr. Steven Tolentino- but has not called to make appt. Reports that she has history of chronic back pain, but nothing like this pain. Pain currently is not as bad, but with movement pain can be 10/10. She took percocet for 3 days. She had some tramadol 50 mg that she has taken rarely. Currently taking tylenol 500 mg TID without much improvement in pain. Has tizanidine, but not helping much. Osteoporosis- not taking calcium supplement. She is worried that it would give her constipation. Has taken one prolia injection in the past but it has been a long time. Prior to prolia she was taking alendronate. Last DEXA scan was 11/2014 that showed osteoporosis. UTI- treated from ER with ceftin 250 mg BID for 5 days. Reports that urinary symptoms have improve. She reports that she must have had increase urinary symptoms prior to ER evaluation, but she can not remember. Rufina lives alone. Does not currently have home PT. Daughter will get her groceries. Family member will be there every other day, but talks daily on phone. Patient is here with her daughter and granddaughter. Reports that she has been out of her water pills for a week or so. She will  her prescriptions today.        Patient Active Problem List   Diagnosis    Moderate persistent asthma without complication    Essential hypertension    Renal insufficiency    Cataract    Hypercholesterolemia LDL goal < 160    Stress incontinence    Macular degeneration    Psoriasis    Osteoporosis- DXA -3.2 (11/14), -2.9 (8/11), -3.3 (9/08)    Osteoarthritis    IBS (irritable bowel syndrome)    Colon polyp- stop due to age   [place] Kurtis Tobacco abuse    Pulmonary nodule, right- stbale, no longer monitoring    Frequent PVCs    COPD (chronic obstructive pulmonary disease) (HCC)    Eczema of right eyelid    Adjustment disorder with anxious mood    Sciatica    Low back pain    Venous insufficiency of both lower extremities    Uterovaginal prolapse    Non-recurrent unilateral inguinal hernia without obstruction or gangrene    Hyperglycemia    Chronic constipation    Left inguinal hernia    Intention tremor          Current Outpatient Medications   Medication Sig Dispense Refill    tiotropium (SPIRIVA HANDIHALER) 18 MCG inhalation capsule INHALE THE ENTIRE CONTENTS OF 1 CAPSULE ONCE A DAY USING HANDIHALER DEVICE 90 capsule 0    tiZANidine (ZANAFLEX) 2 MG tablet Take 1 tablet by mouth every 6 hours as needed (Back pain and spasm) 20 tablet 0    metoprolol succinate (TOPROL XL) 100 MG extended release tablet Take 1 tablet by mouth daily *CALL FOR APPOINTMENT* 90 tablet 0    buPROPion (WELLBUTRIN SR) 150 MG extended release tablet TAKE ONE TABLET BY MOUTH DAILY 90 tablet 0    tiZANidine (ZANAFLEX) 4 MG tablet TAKE ONE TABLET BY MOUTH THREE TIMES A DAY AS NEEDED 90 tablet 2    dilTIAZem (CARDIZEM CD) 120 MG extended release capsule TAKE ONE CAPSULE BY MOUTH TWICE A  capsule 1    theophylline (UNIPHYL) 400 MG extended release tablet TAKE ONE-HALF TABLET BY MOUTH TWICE A DAY 90 tablet 1    nabumetone (RELAFEN) 750 MG tablet TAKE ONE TABLET BY MOUTH DAILY 90 tablet 1    albuterol sulfate HFA (PROAIR HFA) 108 (90 Base) MCG/ACT inhaler Inhale 2 puffs into the lungs every 4 hours as needed for Wheezing 8.5 g 0    dicyclomine (BENTYL) 20 MG tablet TAKE 1 TABLET BY MOUTH EVERY 6 HOURS 360 tablet 0    fluticasone-salmeterol (WIXELA INHUB) 250-50 MCG/DOSE AEPB INHALE 1 PUFF INTO THE LUNGS TWICE DAILY 6 each 1    acetaminophen (TYLENOL) 500 MG tablet Take 500 mg by mouth 3 times daily as needed.  aspirin 81 MG EC tablet Take 81 mg by mouth daily.  diphenhydrAMINE (BENADRYL) 25 MG tablet Take 25 mg by mouth 2 times daily.  hydroCHLOROthiazide (HYDRODIURIL) 25 MG tablet TAKE ONE TABLET BY MOUTH EVERY MORNING (Patient not taking: Reported on 10/22/2020) 90 tablet 0    furosemide (LASIX) 20 MG tablet TAKE ONE TABLET BY MOUTH DAILY (Patient not taking: Reported on 10/22/2020) 90 tablet 0    mometasone (ELOCON) 0.1 % cream Apply  topically 2 times daily. (Patient not taking: Reported on 10/22/2020) 0.1 g 3     No current facility-administered medications for this visit. Allergies   Allergen Reactions    Adhesive Tape Other (See Comments)     Can cause irritation when on awhile    Codeine      GI upset    Iodine Hives    Lisinopril      Angioedema         Review of Systems   Constitutional: Positive for activity change. Negative for fever. Respiratory: Positive for shortness of breath. Negative for cough. Denies increase from baseline. Reports that it is harder to take a deep breath due to her back pain. Cardiovascular: Positive for leg swelling. Negative for chest pain. Has been out of her water pills past week or so. Typically does not have edema if taking medications. Gastrointestinal: Negative for abdominal pain, constipation, diarrhea, nausea and vomiting. Genitourinary: Positive for frequency. Negative for difficulty urinating, dysuria, hematuria and pelvic pain. +urinary frequency, but not worse than her baseline    Musculoskeletal: Positive for arthralgias and myalgias. Neurological: Negative for dizziness, weakness and numbness. Psychiatric/Behavioral: Negative for confusion.        Vitals:    10/22/20 1015   BP: 136/80   Site: Left Upper Arm   Position: Sitting   Cuff Size: Medium Adult   Pulse: 90   Resp: 14   SpO2: 95% There is no height or weight on file to calculate BMI. Wt Readings from Last 3 Encounters:   10/29/19 152 lb (68.9 kg)   04/16/19 155 lb (70.3 kg)   10/16/18 150 lb (68 kg)       BP Readings from Last 3 Encounters:   10/22/20 136/80   10/07/20 118/77   10/29/19 126/74       Physical Exam  Vitals signs and nursing note reviewed. Constitutional:       General: She is not in acute distress. Appearance: She is well-developed. HENT:      Head: Normocephalic and atraumatic. Neck:      Musculoskeletal: Neck supple. Cardiovascular:      Rate and Rhythm: Normal rate and regular rhythm. Heart sounds: Normal heart sounds. No murmur. No friction rub. No gallop. Pulmonary:      Effort: Pulmonary effort is normal. No respiratory distress. Breath sounds: Normal breath sounds. Abdominal:      Palpations: Abdomen is soft. Tenderness: There is no abdominal tenderness. There is no right CVA tenderness. Musculoskeletal:      Right lower leg: Edema present. Left lower leg: Edema present. Comments: +1 salma ankle edema   Wearing back brace. Ambulates with cane. Has pain to palpation on whole thoracic spine. Also with tenderness to paraspinal thoracic area. Skin:     General: Skin is warm and dry. Neurological:      Mental Status: She is alert and oriented to person, place, and time. Psychiatric:         Behavior: Behavior normal.         Thought Content: Thought content normal.         Judgment: Judgment normal.         Assessmentand Plan  Rufina was seen today for back pain. Diagnoses and all orders for this visit:    Compression fracture of T7 vertebra, initial encounter [facility]): CT scan from 10/6/20 showed 70% compression fx at T7 age indeterminate but irregularity involving inferior endplate could suggest this is subacute. -     traMADol (ULTRAM) 50 MG tablet; Take 1 tablet by mouth every 8 hours as needed for Pain for up to 30 days.   [facility] report review and without

## 2020-10-24 LAB
ORGANISM: ABNORMAL
URINE CULTURE, ROUTINE: ABNORMAL

## 2020-10-26 ENCOUNTER — TELEPHONE (OUTPATIENT)
Dept: FAMILY MEDICINE CLINIC | Age: 80
End: 2020-10-26

## 2020-10-26 ENCOUNTER — OFFICE VISIT (OUTPATIENT)
Dept: ORTHOPEDIC SURGERY | Age: 80
End: 2020-10-26
Payer: MEDICARE

## 2020-10-26 VITALS — WEIGHT: 152 LBS | HEIGHT: 67 IN | TEMPERATURE: 97.6 F | BODY MASS INDEX: 23.86 KG/M2

## 2020-10-26 PROCEDURE — 4040F PNEUMOC VAC/ADMIN/RCVD: CPT | Performed by: PHYSICIAN ASSISTANT

## 2020-10-26 PROCEDURE — 1123F ACP DISCUSS/DSCN MKR DOCD: CPT | Performed by: PHYSICIAN ASSISTANT

## 2020-10-26 PROCEDURE — 1036F TOBACCO NON-USER: CPT | Performed by: PHYSICIAN ASSISTANT

## 2020-10-26 PROCEDURE — 99203 OFFICE O/P NEW LOW 30 MIN: CPT | Performed by: PHYSICIAN ASSISTANT

## 2020-10-26 PROCEDURE — 1090F PRES/ABSN URINE INCON ASSESS: CPT | Performed by: PHYSICIAN ASSISTANT

## 2020-10-26 PROCEDURE — G8484 FLU IMMUNIZE NO ADMIN: HCPCS | Performed by: PHYSICIAN ASSISTANT

## 2020-10-26 PROCEDURE — G8427 DOCREV CUR MEDS BY ELIG CLIN: HCPCS | Performed by: PHYSICIAN ASSISTANT

## 2020-10-26 PROCEDURE — G8399 PT W/DXA RESULTS DOCUMENT: HCPCS | Performed by: PHYSICIAN ASSISTANT

## 2020-10-26 PROCEDURE — G8420 CALC BMI NORM PARAMETERS: HCPCS | Performed by: PHYSICIAN ASSISTANT

## 2020-10-26 RX ORDER — CEFUROXIME AXETIL 250 MG/1
250 TABLET ORAL 2 TIMES DAILY
Qty: 20 TABLET | Refills: 0 | Status: SHIPPED | OUTPATIENT
Start: 2020-10-26 | End: 2020-11-05

## 2020-10-26 NOTE — PROGRESS NOTES
History of present illness:   Ms. Leyla Alejandre  is a pleasant 78 y.o. female with a PMH of osteoporosis, HTN, shingles, and stress incontinence kindly referred by [facility] ED for consultation regarding her mid back pain. She states her pain began after reaching trying to make her bed on 10/6/20. Her pain has steadily increased since then. She rates her back pain 10/10 and leg pain 0/10. She describes the pain as constant and sharp that is worse with any movement and better with rest. Denies radiating leg pain, numbness, tingling, or weakness. Denies new bowel or bladder dysfunction. She is currently in TLSO with some relief. She takes ultram, Tylenol and zanaflex. Past medical history:  Her past medical history has been reviewed and is significant for osteoporosis, HTN, shingles, and stress incontinence    Past surgical history:  Her past surgical history has been reviewed and is non-contributory to her present illness. Her medications and allergies were reviewed. Social history:  Her social history has been reviewed and she is a former smoker quitting 2 weeks ago. Current Medication:  Current Outpatient Medications   Medication Sig Dispense Refill    cefUROXime (CEFTIN) 250 MG tablet Take 1 tablet by mouth 2 times daily for 10 days For treatment of bacterial infection. 20 tablet 0    traMADol (ULTRAM) 50 MG tablet Take 1 tablet by mouth every 8 hours as needed for Pain for up to 30 days.  90 tablet 0    tiotropium (SPIRIVA HANDIHALER) 18 MCG inhalation capsule INHALE THE ENTIRE CONTENTS OF 1 CAPSULE ONCE A DAY USING HANDIHALER DEVICE 90 capsule 0    tiZANidine (ZANAFLEX) 2 MG tablet Take 1 tablet by mouth every 6 hours as needed (Back pain and spasm) 20 tablet 0    metoprolol succinate (TOPROL XL) 100 MG extended release tablet Take 1 tablet by mouth daily *CALL FOR APPOINTMENT* 90 tablet 0    buPROPion (WELLBUTRIN SR) 150 MG extended release tablet TAKE ONE TABLET BY MOUTH DAILY 90 tablet 0  tiZANidine (ZANAFLEX) 4 MG tablet TAKE ONE TABLET BY MOUTH THREE TIMES A DAY AS NEEDED 90 tablet 2    dilTIAZem (CARDIZEM CD) 120 MG extended release capsule TAKE ONE CAPSULE BY MOUTH TWICE A  capsule 1    theophylline (UNIPHYL) 400 MG extended release tablet TAKE ONE-HALF TABLET BY MOUTH TWICE A DAY 90 tablet 1    nabumetone (RELAFEN) 750 MG tablet TAKE ONE TABLET BY MOUTH DAILY 90 tablet 1    albuterol sulfate HFA (PROAIR HFA) 108 (90 Base) MCG/ACT inhaler Inhale 2 puffs into the lungs every 4 hours as needed for Wheezing 8.5 g 0    dicyclomine (BENTYL) 20 MG tablet TAKE 1 TABLET BY MOUTH EVERY 6 HOURS 360 tablet 0    fluticasone-salmeterol (WIXELA INHUB) 250-50 MCG/DOSE AEPB INHALE 1 PUFF INTO THE LUNGS TWICE DAILY 6 each 1    mometasone (ELOCON) 0.1 % cream Apply  topically 2 times daily. 0.1 g 3    acetaminophen (TYLENOL) 500 MG tablet Take 500 mg by mouth 3 times daily as needed.  aspirin 81 MG EC tablet Take 81 mg by mouth daily.  diphenhydrAMINE (BENADRYL) 25 MG tablet Take 25 mg by mouth 2 times daily.  denosumab (PROLIA) 60 MG/ML SOSY SC injection Inject 1 mL into the skin once for 1 dose 1 mL 0    hydroCHLOROthiazide (HYDRODIURIL) 25 MG tablet TAKE ONE TABLET BY MOUTH EVERY MORNING (Patient not taking: Reported on 10/22/2020) 90 tablet 0    furosemide (LASIX) 20 MG tablet TAKE ONE TABLET BY MOUTH DAILY (Patient not taking: Reported on 10/22/2020) 90 tablet 0     No current facility-administered medications for this visit. Review of symptoms:  Patient's review of symptoms was reviewed and is significant for back pain and negative for recent weight loss, fatigue, chills, visual disturbances, blood in stool or urine, recent infection, chest pain, or shortness of breath. All other ROS were negative.     Physical examination:  Ms. Kelli Meng's most recent vitals:  Vitals  Temp: 97.6 °F (36.4 °C)  Temp Source: Infrared  Height: 5' 7\" (170.2 cm)  Weight: 152 lb (68.9 kg)  Body mass index is 23.81 kg/m². General exam:  She is well-developed and well-nourished, is in obvious pain and alert and oriented to person, place, and time. She demonstrates appropriate mood and affect. Her skin is warm and dry. Her gait is normal and she walks heel to toe without limp or instability. Back:  She stands with slight lumbar flexion. Her lumbar flexion, extension and lateral bending are not assessed today. She has mild tenderness over her lumbar spine without obvious muscle spasm. The skin over her lumbar spine is normal without a surgical scar. Lower extremities:  She has 5/5 motor strength of bilateral lower extremities. She has a negative straight leg raise, bilaterally. Deep tendon reflexes at knees and achilles are 2+. Sensation is intact to light touch L3 to S1 bilaterally. She has no clonus. Hip range of motion painless. Imaging:  I reviewed CT images of her thoracic spine from 10/6/20. They note age indeterminate T7 compression fracture with 70% height loss. Moderate to severe degenerative facet arthropathy. Assessment:  T7 compression fracture    Plan:  We discussed treatment options including observation, activity modification, TLSO, MRI, and kyphoplasty. Patient would like to proceed with Thoracic MRI to determine if she would be a good candidate for kyphoplasty. We will call her with results and send over to IR. In the meantime she will continue with TLSO and activity modification.      Christina Mcnally PA-C

## 2020-10-30 ENCOUNTER — TELEPHONE (OUTPATIENT)
Dept: ORTHOPEDIC SURGERY | Age: 80
End: 2020-10-30

## 2020-10-30 NOTE — TELEPHONE ENCOUNTER
Called and left  for patient letting her know that the MRI has been authorized through insurance. I informed them to call central scheduling to get scheduled and we will call with the results to let her know if she can get a Kyphoplasty done.  If she has any questions to give me a call back      Approved DANIELLE#[id number]- MRI LUMBAR SPINE WO CONTRAST   Start: 10/26/2020 Expiration: 10/26/2021   Requested: 1 Authorized: 1     Ph: [phone] ([address])

## 2020-11-10 RX ORDER — TIZANIDINE 4 MG/1
TABLET ORAL
Qty: 90 TABLET | Refills: 0 | Status: SHIPPED | OUTPATIENT
Start: 2020-11-10 | End: 2020-11-23

## 2020-11-10 RX ORDER — ALBUTEROL SULFATE 90 UG/1
AEROSOL, METERED RESPIRATORY (INHALATION)
Qty: 8.5 G | Refills: 0 | Status: SHIPPED | OUTPATIENT
Start: 2020-11-10 | End: 2021-02-01

## 2020-11-13 ENCOUNTER — HOSPITAL ENCOUNTER (OUTPATIENT)
Dept: PREADMISSION TESTING | Age: 80
Discharge: HOME | End: 2020-11-17
Payer: MEDICARE

## 2020-11-13 ENCOUNTER — TELEPHONE (OUTPATIENT)
Dept: ORTHOPEDIC SURGERY | Age: 80
End: 2020-11-13

## 2020-11-13 ENCOUNTER — HOSPITAL ENCOUNTER (OUTPATIENT)
Dept: MRI IMAGING | Age: 80
Discharge: HOME | End: 2020-11-13
Payer: MEDICARE

## 2020-11-13 LAB
ABO/RH: NORMAL
ANION GAP SERPL CALCULATED.3IONS-SCNC: 12 MMOL/L (ref 3–16)
ANTIBODY SCREEN: NORMAL
APTT: 27.5 SEC (ref 24.2–36.2)
BASOPHILS ABSOLUTE: 0 K/UL (ref 0–0.2)
BASOPHILS RELATIVE PERCENT: 0.6 %
BILIRUBIN URINE: NEGATIVE
BLOOD, URINE: NEGATIVE
BUN BLDV-MCNC: 27 MG/DL (ref 7–20)
CALCIUM SERPL-MCNC: 10.4 MG/DL (ref 8.3–10.6)
CHLORIDE BLD-SCNC: 94 MMOL/L (ref 99–110)
CLARITY: ABNORMAL
CO2: 30 MMOL/L (ref 21–32)
COLOR: YELLOW
CREAT SERPL-MCNC: 1.1 MG/DL (ref 0.6–1.2)
EKG ATRIAL RATE: 73 BPM
EKG DIAGNOSIS: NORMAL
EKG P AXIS: 79 DEGREES
EKG P-R INTERVAL: 172 MS
EKG Q-T INTERVAL: 416 MS
EKG QRS DURATION: 82 MS
EKG QTC CALCULATION (BAZETT): 458 MS
EKG R AXIS: 66 DEGREES
EKG T AXIS: 72 DEGREES
EKG VENTRICULAR RATE: 73 BPM
EOSINOPHILS ABSOLUTE: 0.2 K/UL (ref 0–0.6)
EOSINOPHILS RELATIVE PERCENT: 2.8 %
EPITHELIAL CELLS, UA: 6 /HPF (ref 0–5)
GFR AFRICAN AMERICAN: 57
GFR NON-AFRICAN AMERICAN: 47
GLUCOSE BLD-MCNC: 106 MG/DL (ref 70–99)
GLUCOSE URINE: NEGATIVE MG/DL
HCT VFR BLD CALC: 36.2 % (ref 36–48)
HEMOGLOBIN: 12.2 G/DL (ref 12–16)
HYALINE CASTS: 2 /LPF (ref 0–8)
INR BLD: 0.97 (ref 0.86–1.14)
KETONES, URINE: NEGATIVE MG/DL
LEUKOCYTE ESTERASE, URINE: ABNORMAL
LYMPHOCYTES ABSOLUTE: 1.1 K/UL (ref 1–5.1)
LYMPHOCYTES RELATIVE PERCENT: 14.3 %
MCH RBC QN AUTO: 31.7 PG (ref 26–34)
MCHC RBC AUTO-ENTMCNC: 33.5 G/DL (ref 31–36)
MCV RBC AUTO: 94.5 FL (ref 80–100)
MICROSCOPIC EXAMINATION: YES
MONOCYTES ABSOLUTE: 0.9 K/UL (ref 0–1.3)
MONOCYTES RELATIVE PERCENT: 11.9 %
NEUTROPHILS ABSOLUTE: 5.3 K/UL (ref 1.7–7.7)
NEUTROPHILS RELATIVE PERCENT: 70.4 %
NITRITE, URINE: NEGATIVE
PDW BLD-RTO: 15.7 % (ref 12.4–15.4)
PH UA: 6 (ref 5–8)
PLATELET # BLD: 308 K/UL (ref 135–450)
PMV BLD AUTO: 7.8 FL (ref 5–10.5)
POTASSIUM SERPL-SCNC: 3.3 MMOL/L (ref 3.5–5.1)
PROTEIN UA: 30 MG/DL
PROTHROMBIN TIME: 11.3 SEC (ref 10–13.2)
RBC # BLD: 3.83 M/UL (ref 4–5.2)
RBC UA: 7 /HPF (ref 0–4)
SEDIMENTATION RATE, ERYTHROCYTE: 53 MM/HR (ref 0–30)
SODIUM BLD-SCNC: 136 MMOL/L (ref 136–145)
SPECIFIC GRAVITY UA: 1.02 (ref 1–1.03)
URINE REFLEX TO CULTURE: YES
URINE TYPE: ABNORMAL
UROBILINOGEN, URINE: 1 E.U./DL
WBC # BLD: 7.5 K/UL (ref 4–11)
WBC UA: 396 /HPF (ref 0–5)

## 2020-11-13 PROCEDURE — 80048 BASIC METABOLIC PNL TOTAL CA: CPT

## 2020-11-13 PROCEDURE — 85652 RBC SED RATE AUTOMATED: CPT

## 2020-11-13 PROCEDURE — 86901 BLOOD TYPING SEROLOGIC RH(D): CPT

## 2020-11-13 PROCEDURE — 81001 URINALYSIS AUTO W/SCOPE: CPT

## 2020-11-13 PROCEDURE — 85025 COMPLETE CBC W/AUTO DIFF WBC: CPT

## 2020-11-13 PROCEDURE — 87641 MR-STAPH DNA AMP PROBE: CPT

## 2020-11-13 PROCEDURE — 72146 MRI CHEST SPINE W/O DYE: CPT

## 2020-11-13 PROCEDURE — 93010 ELECTROCARDIOGRAM REPORT: CPT | Performed by: INTERNAL MEDICINE

## 2020-11-13 PROCEDURE — 85610 PROTHROMBIN TIME: CPT

## 2020-11-13 PROCEDURE — 86850 RBC ANTIBODY SCREEN: CPT

## 2020-11-13 PROCEDURE — 93005 ELECTROCARDIOGRAM TRACING: CPT | Performed by: NEUROLOGICAL SURGERY

## 2020-11-13 PROCEDURE — 86900 BLOOD TYPING SEROLOGIC ABO: CPT

## 2020-11-13 PROCEDURE — 87086 URINE CULTURE/COLONY COUNT: CPT

## 2020-11-13 PROCEDURE — 85730 THROMBOPLASTIN TIME PARTIAL: CPT

## 2020-11-13 NOTE — TELEPHONE ENCOUNTER
Called and left  for patient letting her know we had results and Alina's recommendation was to get the Kyphoplasty done. At this point in her chart it looks like she is scheduled to have it done with a Dr. Suresh Lynch.  If she had any questions for us to give us a call back

## 2020-11-13 NOTE — TELEPHONE ENCOUNTER
----- Message from Armando Middleton PA-C sent at 11/13/2020  1:04 PM EST -----  MRI has been completed and she would like to consider kyphoplasty with IR. Due to the amount of compression to her vertebra and retropulsion they may not do procedure - but we can send over and see what they decide. Should be wearing TLSO and restricting her bending/twisting/lifting in the meantime.

## 2020-11-14 LAB
MRSA SCREEN RT-PCR: NORMAL
URINE CULTURE, ROUTINE: NORMAL

## 2020-11-16 ENCOUNTER — TELEPHONE (OUTPATIENT)
Dept: FAMILY MEDICINE CLINIC | Age: 80
End: 2020-11-16

## 2020-11-19 ENCOUNTER — OFFICE VISIT (OUTPATIENT)
Dept: PRIMARY CARE CLINIC | Age: 80
End: 2020-11-19
Payer: MEDICARE

## 2020-11-19 PROCEDURE — 99211 OFF/OP EST MAY X REQ PHY/QHP: CPT | Performed by: NURSE PRACTITIONER

## 2020-11-20 LAB — SARS-COV-2: NOT DETECTED

## 2020-11-23 RX ORDER — DOCUSATE SODIUM 250 MG
250 CAPSULE ORAL 2 TIMES DAILY PRN
Status: ON HOLD | COMMUNITY
End: 2021-04-21 | Stop reason: HOSPADM

## 2020-11-23 RX ORDER — METOPROLOL SUCCINATE 100 MG/1
TABLET, EXTENDED RELEASE ORAL
Qty: 90 TABLET | Refills: 0 | Status: SHIPPED | OUTPATIENT
Start: 2020-11-23 | End: 2021-02-26

## 2020-11-23 RX ORDER — BUPROPION HYDROCHLORIDE 150 MG/1
TABLET, EXTENDED RELEASE ORAL
Qty: 90 TABLET | Refills: 0 | Status: SHIPPED | OUTPATIENT
Start: 2020-11-23 | End: 2021-02-26

## 2020-11-23 RX ORDER — TRAMADOL HYDROCHLORIDE 50 MG/1
50 TABLET ORAL EVERY 8 HOURS PRN
COMMUNITY
End: 2021-02-01

## 2020-11-23 RX ORDER — ASCORBIC ACID 500 MG
500 TABLET ORAL DAILY
Status: ON HOLD | COMMUNITY
End: 2021-04-21 | Stop reason: HOSPADM

## 2020-11-23 NOTE — PROGRESS NOTES
[address] [facility] time______830______        Surgery time____________    Take the following medications with a sip of water:    Do not eat or drink anything after 12:00 midnight prior to your surgery. This includes water chewing gum, mints and ice chips. You may brush your teeth and gargle the morning of your surgery, but do not swallow the water     Please see your family doctor/pediatrician for a history and physical and/or concerning medications. Bring any test results/reports from your physicians office. If you are under the care of a heart doctor or specialist doctor, please be aware that you may be asked to them for clearance    You may be asked to stop blood thinners such as Coumadin, Plavix, Fragmin, Lovenox, etc., or any anti-inflammatories such as:  Aspirin, Ibuprofen, Advil, Naproxen prior to your surgery. We also ask that you stop any OTC medications such as fish oil, vitamin E, glucosamine, garlic, Multivitamins, COQ 10, etc.    We ask that you do not smoke 24 hours prior to surgery  We ask that you do not  drink any alcoholic beverages 24 hours prior to surgery     You must make arrangements for a responsible adult to take you home after your surgery. For your safety you will not be allowed to leave alone or drive yourself home. Your surgery will be cancelled if you do not have a ride home. Also for your safety, it is strongly suggested that someone stay with you the first 24 hours after your surgery. A parent or legal guardian must accompany a child scheduled for surgery and plan to stay at the hospital until the child is discharged. Please do not bring other children with you. For your comfort, please wear simple loose fitting clothing to the hospital.  Please do not bring valuables.     Do not wear any make-up or nail polish on your fingers or toes      For your safety, please do not wear any jewelry or body piercing's on the day of surgery. All jewelry must be removed. If you have dentures, they will be removed before going to operating room. For your convenience, we will provide you with a container. If you wear contact lenses or glasses, they will be removed, please bring a case for them. If you have a living will and a durable power of  for healthcare, please bring in a copy. As part of our patient safety program to minimize surgical site infections, we ask you to do the following:    · Please notify your surgeon if you develop any illness between         now and the  day of your surgery. · This includes a cough, cold, fever, sore throat, nausea,         or vomiting, and diarrhea, etc.  ·  Please notify your surgeon if you experience dizziness, shortness         of breath or blurred vision between now and the time of your surgery. Do not shave your operative site 96 hours prior to surgery. For face and neck surgery, men may use an electric razor 48 hours   prior to surgery. You may shower the night before surgery or the morning of   your surgery with an antibacterial soap. You will need to bring a photo ID and insurance card    [facility] has an onsite pharmacy, would you like to utilize our pharmacy     If you will be staying overnight and use a C-pap machine, please bring   your C-pap to hospital     Our goal is to provide you with excellent care, therefore, visitors will be limited to two(2) in the room at a time so that we may focus on providing this care for you. Please contact pre-admission testing if you have any further questions. [facility] phone number:  [phone] [facility] fax number:  [phone]  Please note these are generalized instructions for all surgical cases, you may be provided with more specific instructions according to your surgery. C-Difficile admission screening and protocol:     * Admitted with diarrhea? n     *Prior history of C-Diff.  In last 3 months?n     *Antibiotic use in the past 6-8 weeks? YES     *Prior hospitalization or nursing home in the last month?    n

## 2020-11-24 ENCOUNTER — HOSPITAL ENCOUNTER (EMERGENCY)
Age: 80
Discharge: HOME | End: 2020-11-24
Payer: MEDICARE

## 2020-11-24 ENCOUNTER — ANESTHESIA EVENT (OUTPATIENT)
Dept: OPERATING ROOM | Age: 80
End: 2020-11-24
Payer: MEDICARE

## 2020-11-24 ENCOUNTER — TELEPHONE (OUTPATIENT)
Dept: FAMILY MEDICINE CLINIC | Age: 80
End: 2020-11-24

## 2020-11-24 ENCOUNTER — OFFICE VISIT (OUTPATIENT)
Dept: FAMILY MEDICINE CLINIC | Age: 80
End: 2020-11-24
Payer: MEDICARE

## 2020-11-24 VITALS
HEIGHT: 67 IN | HEART RATE: 81 BPM | TEMPERATURE: 98.2 F | SYSTOLIC BLOOD PRESSURE: 154 MMHG | BODY MASS INDEX: 21.18 KG/M2 | DIASTOLIC BLOOD PRESSURE: 71 MMHG | OXYGEN SATURATION: 95 % | RESPIRATION RATE: 18 BRPM | WEIGHT: 134.92 LBS

## 2020-11-24 VITALS
WEIGHT: 135 LBS | RESPIRATION RATE: 14 BRPM | TEMPERATURE: 97.5 F | SYSTOLIC BLOOD PRESSURE: 102 MMHG | HEART RATE: 78 BPM | OXYGEN SATURATION: 95 % | HEIGHT: 67 IN | DIASTOLIC BLOOD PRESSURE: 58 MMHG | BODY MASS INDEX: 21.19 KG/M2

## 2020-11-24 DIAGNOSIS — E87.6 HYPOKALEMIA: ICD-10-CM

## 2020-11-24 DIAGNOSIS — I10 ESSENTIAL HYPERTENSION: ICD-10-CM

## 2020-11-24 DIAGNOSIS — E78.00 HYPERCHOLESTEROLEMIA: ICD-10-CM

## 2020-11-24 LAB
A/G RATIO: 1.1 (ref 1.1–2.2)
A/G RATIO: 1.2 (ref 1.1–2.2)
ALBUMIN SERPL-MCNC: 3.8 G/DL (ref 3.4–5)
ALBUMIN SERPL-MCNC: 3.9 G/DL (ref 3.4–5)
ALP BLD-CCNC: 265 U/L (ref 40–129)
ALP BLD-CCNC: 273 U/L (ref 40–129)
ALT SERPL-CCNC: 17 U/L (ref 10–40)
ALT SERPL-CCNC: 17 U/L (ref 10–40)
ANION GAP SERPL CALCULATED.3IONS-SCNC: 12 MMOL/L (ref 3–16)
ANION GAP SERPL CALCULATED.3IONS-SCNC: 16 MMOL/L (ref 3–16)
AST SERPL-CCNC: 17 U/L (ref 15–37)
AST SERPL-CCNC: 19 U/L (ref 15–37)
BACTERIA: ABNORMAL /HPF
BASOPHILS ABSOLUTE: 0 K/UL (ref 0–0.2)
BASOPHILS RELATIVE PERCENT: 0.5 %
BILIRUB SERPL-MCNC: 0.3 MG/DL (ref 0–1)
BILIRUB SERPL-MCNC: <0.2 MG/DL (ref 0–1)
BILIRUBIN URINE: NEGATIVE
BLOOD, URINE: NEGATIVE
BUN BLDV-MCNC: 29 MG/DL (ref 7–20)
BUN BLDV-MCNC: 30 MG/DL (ref 7–20)
CALCIUM SERPL-MCNC: 10.3 MG/DL (ref 8.3–10.6)
CALCIUM SERPL-MCNC: 10.4 MG/DL (ref 8.3–10.6)
CHLORIDE BLD-SCNC: 95 MMOL/L (ref 99–110)
CHLORIDE BLD-SCNC: 99 MMOL/L (ref 99–110)
CLARITY: ABNORMAL
CO2: 25 MMOL/L (ref 21–32)
CO2: 30 MMOL/L (ref 21–32)
COLOR: YELLOW
COMMENT UA: ABNORMAL
CREAT SERPL-MCNC: 1.1 MG/DL (ref 0.6–1.2)
CREAT SERPL-MCNC: 1.1 MG/DL (ref 0.6–1.2)
EOSINOPHILS ABSOLUTE: 0.2 K/UL (ref 0–0.6)
EOSINOPHILS RELATIVE PERCENT: 2.2 %
EPITHELIAL CELLS, UA: 11 /HPF (ref 0–5)
GFR AFRICAN AMERICAN: 57
GFR AFRICAN AMERICAN: 57
GFR NON-AFRICAN AMERICAN: 47
GFR NON-AFRICAN AMERICAN: 47
GLOBULIN: 3.3 G/DL
GLOBULIN: 3.6 G/DL
GLUCOSE BLD-MCNC: 110 MG/DL (ref 70–99)
GLUCOSE BLD-MCNC: 112 MG/DL (ref 70–99)
GLUCOSE URINE: NEGATIVE MG/DL
HCT VFR BLD CALC: 37 % (ref 36–48)
HEMOGLOBIN: 12.7 G/DL (ref 12–16)
HYALINE CASTS: 7 /LPF (ref 0–8)
KETONES, URINE: NEGATIVE MG/DL
LEUKOCYTE ESTERASE, URINE: ABNORMAL
LYMPHOCYTES ABSOLUTE: 1.1 K/UL (ref 1–5.1)
LYMPHOCYTES RELATIVE PERCENT: 12.1 %
MAGNESIUM: 1.9 MG/DL (ref 1.8–2.4)
MCH RBC QN AUTO: 32.2 PG (ref 26–34)
MCHC RBC AUTO-ENTMCNC: 34.4 G/DL (ref 31–36)
MCV RBC AUTO: 93.6 FL (ref 80–100)
MICROSCOPIC EXAMINATION: YES
MONOCYTES ABSOLUTE: 1.4 K/UL (ref 0–1.3)
MONOCYTES RELATIVE PERCENT: 15.2 %
NEUTROPHILS ABSOLUTE: 6.4 K/UL (ref 1.7–7.7)
NEUTROPHILS RELATIVE PERCENT: 70 %
NITRITE, URINE: NEGATIVE
PDW BLD-RTO: 15.4 % (ref 12.4–15.4)
PH UA: 6 (ref 5–8)
PLATELET # BLD: 303 K/UL (ref 135–450)
PMV BLD AUTO: 7.8 FL (ref 5–10.5)
POTASSIUM SERPL-SCNC: 2.8 MMOL/L (ref 3.5–5.1)
POTASSIUM SERPL-SCNC: 3 MMOL/L (ref 3.5–5.1)
PROTEIN UA: 30 MG/DL
RBC # BLD: 3.95 M/UL (ref 4–5.2)
RBC UA: 5 /HPF (ref 0–4)
SODIUM BLD-SCNC: 137 MMOL/L (ref 136–145)
SODIUM BLD-SCNC: 140 MMOL/L (ref 136–145)
SPECIFIC GRAVITY UA: 1.02 (ref 1–1.03)
TOTAL PROTEIN: 7.2 G/DL (ref 6.4–8.2)
TOTAL PROTEIN: 7.4 G/DL (ref 6.4–8.2)
URINE REFLEX TO CULTURE: YES
URINE TYPE: ABNORMAL
UROBILINOGEN, URINE: 0.2 E.U./DL
WBC # BLD: 9.1 K/UL (ref 4–11)
WBC UA: 334 /HPF (ref 0–5)

## 2020-11-24 PROCEDURE — 3023F SPIROM DOC REV: CPT | Performed by: INTERNAL MEDICINE

## 2020-11-24 PROCEDURE — 1123F ACP DISCUSS/DSCN MKR DOCD: CPT | Performed by: INTERNAL MEDICINE

## 2020-11-24 PROCEDURE — 1090F PRES/ABSN URINE INCON ASSESS: CPT | Performed by: INTERNAL MEDICINE

## 2020-11-24 PROCEDURE — G8427 DOCREV CUR MEDS BY ELIG CLIN: HCPCS | Performed by: INTERNAL MEDICINE

## 2020-11-24 PROCEDURE — 80053 COMPREHEN METABOLIC PANEL: CPT

## 2020-11-24 PROCEDURE — 6370000000 HC RX 637 (ALT 250 FOR IP): Performed by: NURSE PRACTITIONER

## 2020-11-24 PROCEDURE — 87086 URINE CULTURE/COLONY COUNT: CPT

## 2020-11-24 PROCEDURE — 85025 COMPLETE CBC W/AUTO DIFF WBC: CPT

## 2020-11-24 PROCEDURE — 83735 ASSAY OF MAGNESIUM: CPT

## 2020-11-24 PROCEDURE — 81001 URINALYSIS AUTO W/SCOPE: CPT

## 2020-11-24 PROCEDURE — 1036F TOBACCO NON-USER: CPT | Performed by: INTERNAL MEDICINE

## 2020-11-24 PROCEDURE — 99214 OFFICE O/P EST MOD 30 MIN: CPT | Performed by: INTERNAL MEDICINE

## 2020-11-24 PROCEDURE — G8399 PT W/DXA RESULTS DOCUMENT: HCPCS | Performed by: INTERNAL MEDICINE

## 2020-11-24 PROCEDURE — G8926 SPIRO NO PERF OR DOC: HCPCS | Performed by: INTERNAL MEDICINE

## 2020-11-24 PROCEDURE — G8484 FLU IMMUNIZE NO ADMIN: HCPCS | Performed by: INTERNAL MEDICINE

## 2020-11-24 PROCEDURE — 99284 EMERGENCY DEPT VISIT MOD MDM: CPT

## 2020-11-24 PROCEDURE — 4040F PNEUMOC VAC/ADMIN/RCVD: CPT | Performed by: INTERNAL MEDICINE

## 2020-11-24 PROCEDURE — 36415 COLL VENOUS BLD VENIPUNCTURE: CPT

## 2020-11-24 PROCEDURE — G8420 CALC BMI NORM PARAMETERS: HCPCS | Performed by: INTERNAL MEDICINE

## 2020-11-24 RX ORDER — POTASSIUM CHLORIDE 20 MEQ/1
20 TABLET, EXTENDED RELEASE ORAL DAILY
Qty: 30 TABLET | Refills: 1 | Status: SHIPPED | OUTPATIENT
Start: 2020-11-24 | End: 2021-04-02

## 2020-11-24 RX ORDER — POTASSIUM CHLORIDE 750 MG/1
40 TABLET, FILM COATED, EXTENDED RELEASE ORAL ONCE
Status: COMPLETED | OUTPATIENT
Start: 2020-11-24 | End: 2020-11-24

## 2020-11-24 RX ORDER — LEVOFLOXACIN 750 MG/1
750 TABLET ORAL DAILY
COMMUNITY
Start: 2020-11-20 | End: 2021-04-02

## 2020-11-24 RX ADMIN — POTASSIUM CHLORIDE 40 MEQ: 750 TABLET, FILM COATED, EXTENDED RELEASE ORAL at 19:39

## 2020-11-24 ASSESSMENT — ENCOUNTER SYMPTOMS
EYE PAIN: 0
NAUSEA: 0
COUGH: 0
VOMITING: 0
SHORTNESS OF BREATH: 0
VOMITING: 0
WHEEZING: 0
DIARRHEA: 0
NAUSEA: 0
ABDOMINAL PAIN: 0
CONSTIPATION: 0
COLOR CHANGE: 0
DIARRHEA: 0
COLOR CHANGE: 0
SHORTNESS OF BREATH: 0

## 2020-11-24 ASSESSMENT — PAIN SCALES - GENERAL: PAINLEVEL_OUTOF10: 0

## 2020-11-24 NOTE — PROGRESS NOTES
PRE-OP HISTORYAND PHYSICAL    2020    Rufina Cole is a 78 y.o. female who presents to the office today for apreoperative consultation. Chief Complaint   Patient presents with    Pre-op Exam     back surg . with Dr. Marta Telles. sched for a T7 kyphoplasty with bone bx tomorrow  With dr Corine Aparicio. Had lab work and ekg         Smoker who quit with the thoracic compression    At times doing ok, other times in pain. Having tailbone pain to. Had osteoporosis in  . Has no ho MI, Strokes  Has htn , has ho PVC      Past Medical History:   Diagnosis Date    HTN (hypertension)     Hypercholesterolemia     MAC (mycobacterium avium-intracellulare complex)     Osteoarthritis     Osteopetrosis     Papanicolaou smear of cervix with atypical squamous cells of undetermined significance (ASC-US)     Shingles     Stress incontinence      Past Surgical History:   Procedure Laterality Date    BREAST SURGERY      CATARACT REMOVAL Left 2009    CHOLECYSTECTOMY      INGUINAL HERNIA REPAIR Right 2015     Family History   Problem Relation Age of Onset    Diabetes Mother     Cancer Sister     Coronary Art Dis Sister     Coronary Art Dis Brother     Stroke Brother      Social History     Socioeconomic History    Marital status:      Spouse name: None    Number of children: None    Years of education: None    Highest education level: None   Occupational History    None   Social Needs    Financial resource strain: None    Food insecurity     Worry: None     Inability: None    Transportation needs     Medical: None     Non-medical: None   Tobacco Use    Smoking status: Former Smoker     Packs/day: 0.50     Years: 65.00     Pack years: 32.50     Types: Cigarettes     Last attempt to quit: 10/6/2020     Years since quittin.1    Smokeless tobacco: Never Used    Tobacco comment: Advised to quit repeatedly, not interested   Substance and Sexual Activity    Alcohol use:  Yes Comment: rare    Drug use: No    Sexual activity: Never   Lifestyle    Physical activity     Days per week: None     Minutes per session: None    Stress: None   Relationships    Social connections     Talks on phone: None     Gets together: None     Attends Buddhism service: None     Active member of club or organization: None     Attends meetings of clubs or organizations: None     Relationship status: None    Intimate partner violence     Fear of current or ex partner: None     Emotionally abused: None     Physically abused: None     Forced sexual activity: None   Other Topics Concern    None   Social History Narrative    Self-breast exams: yes    Exercise: housework 3-4x/wk    Seatbelt use: Always    Living will: no,   additional information provided    Sexual activity: none       Prior to Visit Medications    Medication Sig Taking? Authorizing Provider   levoFLOXacin (LEVAQUIN) 750 MG tablet Take 750 mg by mouth daily Yes Historical Provider, MD   metoprolol succinate (TOPROL XL) 100 MG extended release tablet TAKE ONE TABLET BY MOUTH DAILY **CALL TO MAKE APPOINTMENT WITH DOCTOR** Yes Guido Plata MD   buPROPion (WELLBUTRIN SR) 150 MG extended release tablet TAKE ONE TABLET BY MOUTH DAILY Yes Guido Plata MD   traMADol (ULTRAM) 50 MG tablet Take 50 mg by mouth every 8 hours as needed for Pain.  Yes Historical Provider, MD   Multiple Vitamins-Minerals (PRESERVISION AREDS PO) Take by mouth daily Yes Historical Provider, MD   Multiple Vitamins-Minerals (MULTIVITAMIN ADULTS PO) Take by mouth Yes Historical Provider, MD   vitamin C (ASCORBIC ACID) 500 MG tablet Take 500 mg by mouth daily Yes Historical Provider, MD   docusate sodium (STOOL SOFTENER) 250 MG capsule Take 250 mg by mouth 2 times daily as needed for Constipation Yes Historical Provider, MD   albuterol sulfate  (90 Base) MCG/ACT inhaler INHALE TWO PUFFS BY MOUTH EVERY 4 HOURS AS NEEDED FOR WHEEZING Yes Guido Plata MD   tiotropium (Matthew Jade) 18 MCG inhalation capsule INHALE THE ENTIRE CONTENTS OF 1 CAPSULE ONCE A DAY USING HANDIHALER DEVICE Yes Efrain Sen MD   hydroCHLOROthiazide (HYDRODIURIL) 25 MG tablet TAKE ONE TABLET BY MOUTH EVERY MORNING Yes Efrain Sen MD   furosemide (LASIX) 20 MG tablet TAKE ONE TABLET BY MOUTH DAILY Yes Efrain Sen MD   tiZANidine (ZANAFLEX) 2 MG tablet Take 1 tablet by mouth every 6 hours as needed (Back pain and spasm)  Patient taking differently: Take 2 mg by mouth every 8 hours as needed (Back pain and spasm)  Yes Su Hightower PA-C   dilTIAZem (CARDIZEM CD) 120 MG extended release capsule TAKE ONE CAPSULE BY MOUTH TWICE A DAY Yes Efrain Sen MD   theophylline (UNIPHYL) 400 MG extended release tablet TAKE ONE-HALF TABLET BY MOUTH TWICE A DAY Yes Efrain Sen MD   nabumetone (RELAFEN) 750 MG tablet TAKE ONE TABLET BY MOUTH DAILY Yes Efrain Sen MD   dicyclomine (BENTYL) 20 MG tablet TAKE 1 TABLET BY MOUTH EVERY 6 HOURS Yes Ashley Bryant MD   fluticasone-salmeterol (Peder Hunt) 250-50 MCG/DOSE AEPB INHALE 1 PUFF INTO THE LUNGS TWICE DAILY Yes Efrain Sen MD   acetaminophen (TYLENOL) 500 MG tablet Take 500 mg by mouth 3 times daily as needed. Yes Historical Provider, MD   aspirin 81 MG EC tablet Take 81 mg by mouth daily. Yes Historical Provider, MD   denosumab (PROLIA) 60 MG/ML SOSY SC injection Inject 1 mL into the skin once for 1 dose  Fredie Nurse, APRN - CNP     Allergies   Allergen Reactions    Adhesive Tape Other (See Comments)     Can cause irritation when on awhile    Codeine      GI upset    Iodine Hives    Lisinopril      Angioedema         Review of Systems   Constitutional: Positive for unexpected weight change (lost weight with this fracture). Negative for fatigue. HENT: Negative for hearing loss and tinnitus. Eyes: Negative for pain and visual disturbance. Respiratory: Negative for shortness of breath and wheezing.     Cardiovascular: Negative for chest pain, palpitations and leg swelling. Gastrointestinal: Negative for constipation, diarrhea, nausea and vomiting. Endocrine: Negative for cold intolerance and heat intolerance. Genitourinary: Negative for dysuria and frequency. Musculoskeletal: Negative for gait problem and joint swelling. Skin: Negative for color change and rash. Neurological: Negative for dizziness and headaches. Psychiatric/Behavioral: Negative for dysphoric mood. The patient is nervous/anxious. PHYSICAL EXAM   BP (!) 102/58   Pulse 78   Temp 97.5 °F (36.4 °C)   Resp 14   Ht 5' 7\" (1.702 m)   Wt 135 lb (61.2 kg)   SpO2 95%   BMI 21.14 kg/m²   Wt Readings from Last 3 Encounters:   11/24/20 135 lb (61.2 kg)   10/26/20 152 lb (68.9 kg)   10/29/19 152 lb (68.9 kg)     BP Readings from Last 3 Encounters:   11/24/20 (!) 102/58   10/22/20 136/80   10/07/20 118/77       Physical Exam  Constitutional:       Appearance: Normal appearance. She is well-developed. HENT:      Head: Normocephalic and atraumatic. Right Ear: Tympanic membrane and ear canal normal.      Left Ear: Tympanic membrane and ear canal normal.      Nose: Nose normal. No congestion. Mouth/Throat:      Pharynx: Oropharynx is clear. No oropharyngeal exudate. Eyes:      General: No scleral icterus. Extraocular Movements: Extraocular movements intact. Conjunctiva/sclera: Conjunctivae normal.   Neck:      Musculoskeletal: Neck supple. No muscular tenderness. Thyroid: No thyromegaly. Vascular: No carotid bruit. Cardiovascular:      Rate and Rhythm: Normal rate and regular rhythm. Heart sounds: Normal heart sounds. No murmur. Pulmonary:      Effort: Pulmonary effort is normal. No respiratory distress. Breath sounds: Normal breath sounds. No wheezing. Abdominal:      General: There is no distension. Palpations: There is no mass. Tenderness: There is no abdominal tenderness.    Musculoskeletal:         General: No deformity. Right lower leg: Edema present. Left lower leg: Edema (left is worse than the right.  ) present. Lymphadenopathy:      Cervical: No cervical adenopathy. Skin:     General: Skin is warm and dry. Findings: No rash. Neurological:      Mental Status: She is alert. Motor: No abnormal muscle tone. Comments: Motor  5/5 upper ext   Moving both lower ext bilat    Psychiatric:         Mood and Affect: Mood normal.         Behavior: Behavior normal.         Thought Content: Thought content normal.         Judgment: Judgment normal.       Rufina was seen today for pre-op exam.    Diagnoses and all orders for this visit:    Preop examination    Osteoporosis- DXA -3.2 (11/14), -2.9 (8/11), -3.3 (9/08)    Moderate persistent asthma without complication    Pulmonary emphysema, unspecified emphysema type (HCC)    Frequent PVCs  History of this in the chart    Essential hypertension  -     Comprehensive Metabolic Panel; Future  bp is ok    Hypercholesterolemia LDL goal < 160  -     Comprehensive Metabolic Panel; Future    Hypokalemia  -     Cancel: Comprehensive Metabolic Panel; Future  -     Comprehensive Metabolic Panel; Future    Will ck stat lab today due to the low potassium reviewed from labs  She had hypokalemia  Surgery is sched for tomorrow.   Appears stable for surgery  Her bp low normal today  On diltiazem suggest hold it in the am. With the low bp today  Time   29 min

## 2020-11-24 NOTE — TELEPHONE ENCOUNTER
Please tell patient that POTASSIUM LOWER THAN IT HAS EVER BEEN. Go to ER now so they can correct it carefully by IV while you are on heart monitor. Let me know which ER and I will call ahead.

## 2020-11-24 NOTE — TELEPHONE ENCOUNTER
Called and spoke with dop they are taking her to [address] [place] [address],[address]

## 2020-11-25 ENCOUNTER — HOSPITAL ENCOUNTER (OUTPATIENT)
Age: 80
Setting detail: OUTPATIENT SURGERY
Discharge: HOME | End: 2020-11-25
Attending: NEUROLOGICAL SURGERY | Admitting: NEUROLOGICAL SURGERY
Payer: MEDICARE

## 2020-11-25 ENCOUNTER — ANESTHESIA (OUTPATIENT)
Dept: OPERATING ROOM | Age: 80
End: 2020-11-25
Payer: MEDICARE

## 2020-11-25 ENCOUNTER — APPOINTMENT (OUTPATIENT)
Dept: GENERAL RADIOLOGY | Age: 80
End: 2020-11-25
Attending: NEUROLOGICAL SURGERY
Payer: MEDICARE

## 2020-11-25 VITALS
HEART RATE: 88 BPM | WEIGHT: 138 LBS | TEMPERATURE: 97.2 F | OXYGEN SATURATION: 93 % | SYSTOLIC BLOOD PRESSURE: 136 MMHG | BODY MASS INDEX: 21.66 KG/M2 | HEIGHT: 67 IN | DIASTOLIC BLOOD PRESSURE: 72 MMHG | RESPIRATION RATE: 14 BRPM

## 2020-11-25 VITALS
RESPIRATION RATE: 5 BRPM | SYSTOLIC BLOOD PRESSURE: 152 MMHG | DIASTOLIC BLOOD PRESSURE: 67 MMHG | OXYGEN SATURATION: 100 % | TEMPERATURE: 96.8 F

## 2020-11-25 LAB
ABO/RH: NORMAL
ANTIBODY SCREEN: NORMAL
URINE CULTURE, ROUTINE: NORMAL

## 2020-11-25 PROCEDURE — 6360000002 HC RX W HCPCS: Performed by: NEUROLOGICAL SURGERY

## 2020-11-25 PROCEDURE — 86901 BLOOD TYPING SEROLOGIC RH(D): CPT

## 2020-11-25 PROCEDURE — 6360000002 HC RX W HCPCS

## 2020-11-25 PROCEDURE — 2580000003 HC RX 258: Performed by: NURSE ANESTHETIST, CERTIFIED REGISTERED

## 2020-11-25 PROCEDURE — 2500000003 HC RX 250 WO HCPCS: Performed by: NURSE ANESTHETIST, CERTIFIED REGISTERED

## 2020-11-25 PROCEDURE — 2500000003 HC RX 250 WO HCPCS

## 2020-11-25 PROCEDURE — 88307 TISSUE EXAM BY PATHOLOGIST: CPT

## 2020-11-25 PROCEDURE — 86900 BLOOD TYPING SEROLOGIC ABO: CPT

## 2020-11-25 PROCEDURE — 2580000003 HC RX 258: Performed by: ANESTHESIOLOGY

## 2020-11-25 PROCEDURE — 7100000010 HC PHASE II RECOVERY - FIRST 15 MIN: Performed by: NEUROLOGICAL SURGERY

## 2020-11-25 PROCEDURE — 86850 RBC ANTIBODY SCREEN: CPT

## 2020-11-25 PROCEDURE — 6360000002 HC RX W HCPCS: Performed by: NURSE ANESTHETIST, CERTIFIED REGISTERED

## 2020-11-25 PROCEDURE — 2580000003 HC RX 258

## 2020-11-25 PROCEDURE — 2500000003 HC RX 250 WO HCPCS: Performed by: NEUROLOGICAL SURGERY

## 2020-11-25 PROCEDURE — 88342 IMHCHEM/IMCYTCHM 1ST ANTB: CPT

## 2020-11-25 PROCEDURE — 3700000000 HC ANESTHESIA ATTENDED CARE: Performed by: NEUROLOGICAL SURGERY

## 2020-11-25 PROCEDURE — 7100000001 HC PACU RECOVERY - ADDTL 15 MIN: Performed by: NEUROLOGICAL SURGERY

## 2020-11-25 PROCEDURE — 3600000012 HC SURGERY LEVEL 2 ADDTL 15MIN: Performed by: NEUROLOGICAL SURGERY

## 2020-11-25 PROCEDURE — 7100000000 HC PACU RECOVERY - FIRST 15 MIN: Performed by: NEUROLOGICAL SURGERY

## 2020-11-25 PROCEDURE — C1894 INTRO/SHEATH, NON-LASER: HCPCS | Performed by: NEUROLOGICAL SURGERY

## 2020-11-25 PROCEDURE — 88341 IMHCHEM/IMCYTCHM EA ADD ANTB: CPT

## 2020-11-25 PROCEDURE — 2709999900 HC NON-CHARGEABLE SUPPLY: Performed by: NEUROLOGICAL SURGERY

## 2020-11-25 PROCEDURE — 2720000010 HC SURG SUPPLY STERILE: Performed by: NEUROLOGICAL SURGERY

## 2020-11-25 PROCEDURE — 7100000011 HC PHASE II RECOVERY - ADDTL 15 MIN: Performed by: NEUROLOGICAL SURGERY

## 2020-11-25 PROCEDURE — 72070 X-RAY EXAM THORAC SPINE 2VWS: CPT

## 2020-11-25 PROCEDURE — 3600000002 HC SURGERY LEVEL 2 BASE: Performed by: NEUROLOGICAL SURGERY

## 2020-11-25 PROCEDURE — 3700000001 HC ADD 15 MINUTES (ANESTHESIA): Performed by: NEUROLOGICAL SURGERY

## 2020-11-25 PROCEDURE — 3209999900 FLUORO FOR SURGICAL PROCEDURES

## 2020-11-25 PROCEDURE — C1713 ANCHOR/SCREW BN/BN,TIS/BN: HCPCS | Performed by: NEUROLOGICAL SURGERY

## 2020-11-25 DEVICE — BONE CEMENT C01B HV-R WITH MIXER  US
Type: IMPLANTABLE DEVICE | Site: VERTEBRAE | Status: FUNCTIONAL
Brand: KYPHON® HV-R® BONE CEMENT AND KYPHON® MIXER PACK

## 2020-11-25 DEVICE — CEMENT C01A KYPHX HV-R BONE CEMENT EN
Type: IMPLANTABLE DEVICE | Site: VERTEBRAE | Status: FUNCTIONAL
Brand: KYPHON® HV-R® BONE CEMENT

## 2020-11-25 RX ORDER — GLYCOPYRROLATE 0.2 MG/ML
INJECTION INTRAMUSCULAR; INTRAVENOUS PRN
Status: DISCONTINUED | OUTPATIENT
Start: 2020-11-25 | End: 2020-11-25 | Stop reason: SDUPTHER

## 2020-11-25 RX ORDER — TIZANIDINE 2 MG/1
2 TABLET ORAL EVERY 6 HOURS PRN
Qty: 90 TABLET | Refills: 0 | Status: SHIPPED | OUTPATIENT
Start: 2020-11-25 | End: 2020-12-21

## 2020-11-25 RX ORDER — SUCCINYLCHOLINE/SOD CL,ISO/PF 200MG/10ML
SYRINGE (ML) INTRAVENOUS PRN
Status: DISCONTINUED | OUTPATIENT
Start: 2020-11-25 | End: 2020-11-25 | Stop reason: SDUPTHER

## 2020-11-25 RX ORDER — LIDOCAINE HYDROCHLORIDE 20 MG/ML
INJECTION, SOLUTION EPIDURAL; INFILTRATION; INTRACAUDAL; PERINEURAL PRN
Status: DISCONTINUED | OUTPATIENT
Start: 2020-11-25 | End: 2020-11-25 | Stop reason: SDUPTHER

## 2020-11-25 RX ORDER — LIDOCAINE HYDROCHLORIDE AND EPINEPHRINE 10; 10 MG/ML; UG/ML
INJECTION, SOLUTION INFILTRATION; PERINEURAL
Status: COMPLETED | OUTPATIENT
Start: 2020-11-25 | End: 2020-11-25

## 2020-11-25 RX ORDER — FENTANYL CITRATE 50 UG/ML
INJECTION, SOLUTION INTRAMUSCULAR; INTRAVENOUS PRN
Status: DISCONTINUED | OUTPATIENT
Start: 2020-11-25 | End: 2020-11-25 | Stop reason: SDUPTHER

## 2020-11-25 RX ORDER — FENTANYL CITRATE 50 UG/ML
25 INJECTION, SOLUTION INTRAMUSCULAR; INTRAVENOUS EVERY 5 MIN PRN
Status: DISCONTINUED | OUTPATIENT
Start: 2020-11-25 | End: 2020-11-25 | Stop reason: HOSPADM

## 2020-11-25 RX ORDER — LABETALOL HYDROCHLORIDE 5 MG/ML
5 INJECTION, SOLUTION INTRAVENOUS EVERY 10 MIN PRN
Status: DISCONTINUED | OUTPATIENT
Start: 2020-11-25 | End: 2020-11-25 | Stop reason: HOSPADM

## 2020-11-25 RX ORDER — DEXAMETHASONE SODIUM PHOSPHATE 4 MG/ML
INJECTION, SOLUTION INTRA-ARTICULAR; INTRALESIONAL; INTRAMUSCULAR; INTRAVENOUS; SOFT TISSUE PRN
Status: DISCONTINUED | OUTPATIENT
Start: 2020-11-25 | End: 2020-11-25 | Stop reason: SDUPTHER

## 2020-11-25 RX ORDER — ONDANSETRON 2 MG/ML
INJECTION INTRAMUSCULAR; INTRAVENOUS PRN
Status: DISCONTINUED | OUTPATIENT
Start: 2020-11-25 | End: 2020-11-25 | Stop reason: SDUPTHER

## 2020-11-25 RX ORDER — PROPOFOL 10 MG/ML
INJECTION, EMULSION INTRAVENOUS PRN
Status: DISCONTINUED | OUTPATIENT
Start: 2020-11-25 | End: 2020-11-25 | Stop reason: SDUPTHER

## 2020-11-25 RX ORDER — SODIUM CHLORIDE 0.9 % (FLUSH) 0.9 %
10 SYRINGE (ML) INJECTION EVERY 12 HOURS SCHEDULED
Status: DISCONTINUED | OUTPATIENT
Start: 2020-11-25 | End: 2020-11-25 | Stop reason: HOSPADM

## 2020-11-25 RX ORDER — SODIUM CHLORIDE 0.9 % (FLUSH) 0.9 %
10 SYRINGE (ML) INJECTION PRN
Status: DISCONTINUED | OUTPATIENT
Start: 2020-11-25 | End: 2020-11-25 | Stop reason: HOSPADM

## 2020-11-25 RX ORDER — PROMETHAZINE HYDROCHLORIDE 25 MG/ML
6.25 INJECTION, SOLUTION INTRAMUSCULAR; INTRAVENOUS
Status: DISCONTINUED | OUTPATIENT
Start: 2020-11-25 | End: 2020-11-25 | Stop reason: HOSPADM

## 2020-11-25 RX ORDER — SODIUM CHLORIDE 9 MG/ML
INJECTION, SOLUTION INTRAVENOUS CONTINUOUS
Status: DISCONTINUED | OUTPATIENT
Start: 2020-11-25 | End: 2020-11-25 | Stop reason: HOSPADM

## 2020-11-25 RX ORDER — VECURONIUM BROMIDE 1 MG/ML
INJECTION, POWDER, LYOPHILIZED, FOR SOLUTION INTRAVENOUS PRN
Status: DISCONTINUED | OUTPATIENT
Start: 2020-11-25 | End: 2020-11-25 | Stop reason: SDUPTHER

## 2020-11-25 RX ORDER — SODIUM CHLORIDE 9 MG/ML
INJECTION, SOLUTION INTRAVENOUS CONTINUOUS PRN
Status: DISCONTINUED | OUTPATIENT
Start: 2020-11-25 | End: 2020-11-25

## 2020-11-25 RX ORDER — HYDROCODONE BITARTRATE AND ACETAMINOPHEN 5; 325 MG/1; MG/1
1 TABLET ORAL EVERY 4 HOURS PRN
Qty: 42 TABLET | Refills: 0 | Status: SHIPPED | OUTPATIENT
Start: 2020-11-25 | End: 2020-12-02

## 2020-11-25 RX ORDER — SODIUM CHLORIDE 9 MG/ML
INJECTION INTRAVENOUS PRN
Status: DISCONTINUED | OUTPATIENT
Start: 2020-11-25 | End: 2020-11-25 | Stop reason: SDUPTHER

## 2020-11-25 RX ADMIN — VECURONIUM BROMIDE 4 MG: 1 INJECTION, POWDER, LYOPHILIZED, FOR SOLUTION INTRAVENOUS at 11:35

## 2020-11-25 RX ADMIN — SODIUM CHLORIDE: 9 INJECTION, SOLUTION INTRAVENOUS at 09:31

## 2020-11-25 RX ADMIN — FENTANYL CITRATE 50 MCG: 50 INJECTION INTRAMUSCULAR; INTRAVENOUS at 11:29

## 2020-11-25 RX ADMIN — Medication 100 MG: at 11:32

## 2020-11-25 RX ADMIN — ONDANSETRON 4 MG: 2 INJECTION INTRAMUSCULAR; INTRAVENOUS at 11:37

## 2020-11-25 RX ADMIN — SODIUM CHLORIDE 4 ML: 9 INJECTION INTRAMUSCULAR; INTRAVENOUS; SUBCUTANEOUS at 11:35

## 2020-11-25 RX ADMIN — FENTANYL CITRATE 50 MCG: 50 INJECTION INTRAMUSCULAR; INTRAVENOUS at 11:32

## 2020-11-25 RX ADMIN — LIDOCAINE HYDROCHLORIDE 60 MG: 20 INJECTION, SOLUTION EPIDURAL; INFILTRATION; INTRACAUDAL; PERINEURAL at 11:32

## 2020-11-25 RX ADMIN — DEXAMETHASONE SODIUM PHOSPHATE 8 MG: 4 INJECTION, SOLUTION INTRAMUSCULAR; INTRAVENOUS at 11:37

## 2020-11-25 RX ADMIN — PROPOFOL 100 MG: 10 INJECTION, EMULSION INTRAVENOUS at 11:32

## 2020-11-25 RX ADMIN — SUGAMMADEX 200 MG: 100 INJECTION, SOLUTION INTRAVENOUS at 12:20

## 2020-11-25 RX ADMIN — CEFAZOLIN SODIUM 2 G: 10 INJECTION, POWDER, FOR SOLUTION INTRAVENOUS at 11:28

## 2020-11-25 RX ADMIN — GLYCOPYRROLATE 0.2 MG: 0.2 INJECTION, SOLUTION INTRAMUSCULAR; INTRAVENOUS at 11:37

## 2020-11-25 ASSESSMENT — PULMONARY FUNCTION TESTS
PIF_VALUE: 2
PIF_VALUE: 14
PIF_VALUE: 15
PIF_VALUE: 16
PIF_VALUE: 15
PIF_VALUE: 13
PIF_VALUE: 13
PIF_VALUE: 16
PIF_VALUE: 13
PIF_VALUE: 15
PIF_VALUE: 15
PIF_VALUE: 16
PIF_VALUE: 16
PIF_VALUE: 13
PIF_VALUE: 2
PIF_VALUE: 13
PIF_VALUE: 1
PIF_VALUE: 14
PIF_VALUE: 16
PIF_VALUE: 14
PIF_VALUE: 15
PIF_VALUE: 2
PIF_VALUE: 16
PIF_VALUE: 14
PIF_VALUE: 13
PIF_VALUE: 13
PIF_VALUE: 1
PIF_VALUE: 0
PIF_VALUE: 14
PIF_VALUE: 16
PIF_VALUE: 15
PIF_VALUE: 13
PIF_VALUE: 2
PIF_VALUE: 15
PIF_VALUE: 1
PIF_VALUE: 6
PIF_VALUE: 14
PIF_VALUE: 33
PIF_VALUE: 13
PIF_VALUE: 4
PIF_VALUE: 1
PIF_VALUE: 30
PIF_VALUE: 14
PIF_VALUE: 1
PIF_VALUE: 15
PIF_VALUE: 14
PIF_VALUE: 12
PIF_VALUE: 14
PIF_VALUE: 14
PIF_VALUE: 17
PIF_VALUE: 14
PIF_VALUE: 15
PIF_VALUE: 14
PIF_VALUE: 14

## 2020-11-25 ASSESSMENT — PAIN SCALES - GENERAL
PAINLEVEL_OUTOF10: 0

## 2020-11-25 ASSESSMENT — PAIN - FUNCTIONAL ASSESSMENT
PAIN_FUNCTIONAL_ASSESSMENT: 0-10
PAIN_FUNCTIONAL_ASSESSMENT: 0-10

## 2020-11-25 NOTE — BRIEF OP NOTE
Brief Postoperative Note      Patient: Salo Siegel  YOB: 1940  MRN: [id number]    Date of Procedure: 11/25/2020    Pre-Op Diagnosis: AGE RELATED FRACTURE    Post-Op Diagnosis: Same       Procedure(s):  T7 KYPHOPLASTY WITH BONE BIOPSY    Surgeon(s):  Melissa Urbano MD    Assistant:  Surgical Assistant: Yadiel Chavez    Anesthesia: General    Estimated Blood Loss (mL): Minimal    Complications: None    Specimens:   ID Type Source Tests Collected by Time Destination   A : T7 bone biopsy Bone Bone SURGICAL PATHOLOGY Melissa Urbano MD 11/25/2020 1153        Implants:  Implant Name Type Inv.  Item Serial No.  Lot No. LRB No. Used Action   CEMENT BNE FULL DOSE HI VISC RADPQ W/O ANTIBIO FOR  CEMENT BNE FULL DOSE HI VISC RADPQ W/O ANTIBIO FOR  [employer] [facility] [id number] N/A 1 Implanted   KIT CEMENT BONE W/KYPHON MIXER TUBE FUNNL Cement KIT CEMENT BONE W/KYPHON MIXER TUBE [address] [facility] [id number] N/A 1 Implanted         Drains: * No LDAs found *    Findings: compression fracture, osteoporosis,     Electronically signed by Melissa Urbano MD on 11/25/2020 at 12:21 PM     [id number]

## 2020-11-25 NOTE — ED TRIAGE NOTES
Randal Fischer is a 78 y.o. female presents in the ED today via private vehicle after seeing her PCP. Pt had pre surgery labs done for her surgery tomorrow morning and was instructed to come to the ED for a low potassium. Pt was placed on the monitor. Breathing is easy, even and unlabored. Skin is warm and dry. Pt is alert and oriented x4. Call light in reach.

## 2020-11-25 NOTE — PROGRESS NOTES
Pt arrived to PACU from OR. Pt sleeping on 3l/nc, awakens easily. Back dressing C/D/I. Pt denies c/o pain.  VSS

## 2020-11-25 NOTE — PROGRESS NOTES
Ambul to BR using york. Assisted, gait steady. To chair, anshul po drink well. Voided in BR without difficulty.

## 2020-11-25 NOTE — H&P
Update History & Physical    The patient's History and Physical of November 25, 2020 was reviewed with the patient and I examined the patient. There was no change. The surgical site was confirmed by the patient and me. Plan: The risks, benefits, expected outcome, and alternative to the recommended procedure have been discussed with the patient. Patient understands and wants to proceed with the procedure.      Electronically signed by Nenita Kang MD on 11/25/2020 at 9:47 AM

## 2020-11-25 NOTE — ED PROVIDER NOTES
**ADVANCED PRACTICE PROVIDER, I HAVE EVALUATED THIS PATIENT**        [address]      Pt Name: Kelsey Jarrett  IDH:[id number]  [place] 1940  Date of evaluation: 11/24/2020  Provider: ALYSIA Nair - CNP      Chief Complaint:    Chief Complaint   Patient presents with    Abnormal Lab     Sent by Dr Kena Martinez. had preop labs-potassium 2.6. preop for kyphoplasty. Nursing Notes, Past Medical Hx, Past Surgical Hx, Social Hx, Allergies, and Family Hx were all reviewed and agreed with or any disagreements were addressed in the HPI.    HPI:  (Location, Duration, Timing, Severity, Quality, Assoc Sx, Context, Modifying factors)  This is a  78 y.o. female who presents to the emergency department today for assessment of low potassium. She advised that she went for her preop physical today, and she was told she had low potassium. She does take diuretics. She denies any complaints. PastMedical/Surgical History:      Diagnosis Date    HTN (hypertension)     Hypercholesterolemia     MAC (mycobacterium avium-intracellulare complex)     Osteoarthritis     Osteopetrosis     Papanicolaou smear of cervix with atypical squamous cells of undetermined significance (ASC-US) 1998    Shingles     Stress incontinence          Procedure Laterality Date    BREAST SURGERY      CATARACT REMOVAL Left 2009    CHOLECYSTECTOMY      INGUINAL HERNIA REPAIR Right 2015       Medications:  Previous Medications    ACETAMINOPHEN (TYLENOL) 500 MG TABLET    Take 500 mg by mouth 3 times daily as needed. ALBUTEROL SULFATE  (90 BASE) MCG/ACT INHALER    INHALE TWO PUFFS BY MOUTH EVERY 4 HOURS AS NEEDED FOR WHEEZING    ASPIRIN 81 MG EC TABLET    Take 81 mg by mouth daily.       BUPROPION (WELLBUTRIN SR) 150 MG EXTENDED RELEASE TABLET    TAKE ONE TABLET BY MOUTH DAILY    DENOSUMAB (PROLIA) 60 MG/ML SOSY SC INJECTION    Inject 1 mL into the skin once for 1 dose    DICYCLOMINE (BENTYL) 20 MG TABLET    TAKE 1 TABLET BY MOUTH EVERY 6 HOURS    DILTIAZEM (CARDIZEM CD) 120 MG EXTENDED RELEASE CAPSULE    TAKE ONE CAPSULE BY MOUTH TWICE A DAY    DOCUSATE SODIUM (STOOL SOFTENER) 250 MG CAPSULE    Take 250 mg by mouth 2 times daily as needed for Constipation    FLUTICASONE-SALMETEROL (WIXELA INHUB) 250-50 MCG/DOSE AEPB    INHALE 1 PUFF INTO THE LUNGS TWICE DAILY    FUROSEMIDE (LASIX) 20 MG TABLET    TAKE ONE TABLET BY MOUTH DAILY    HYDROCHLOROTHIAZIDE (HYDRODIURIL) 25 MG TABLET    TAKE ONE TABLET BY MOUTH EVERY MORNING    LEVOFLOXACIN (LEVAQUIN) 750 MG TABLET    Take 750 mg by mouth daily    METOPROLOL SUCCINATE (TOPROL XL) 100 MG EXTENDED RELEASE TABLET    TAKE ONE TABLET BY MOUTH DAILY **CALL TO MAKE APPOINTMENT WITH DOCTOR**    MULTIPLE VITAMINS-MINERALS (MULTIVITAMIN ADULTS PO)    Take by mouth    MULTIPLE VITAMINS-MINERALS (PRESERVISION AREDS PO)    Take by mouth daily    NABUMETONE (RELAFEN) 750 MG TABLET    TAKE ONE TABLET BY MOUTH DAILY    THEOPHYLLINE (UNIPHYL) 400 MG EXTENDED RELEASE TABLET    TAKE ONE-HALF TABLET BY MOUTH TWICE A DAY    TIOTROPIUM (SPIRIVA HANDIHALER) 18 MCG INHALATION CAPSULE    INHALE THE ENTIRE CONTENTS OF 1 CAPSULE ONCE A DAY USING HANDIHALER DEVICE    TIZANIDINE (ZANAFLEX) 2 MG TABLET    Take 1 tablet by mouth every 6 hours as needed (Back pain and spasm)    TRAMADOL (ULTRAM) 50 MG TABLET    Take 50 mg by mouth every 8 hours as needed for Pain. VITAMIN C (ASCORBIC ACID) 500 MG TABLET    Take 500 mg by mouth daily         Review of Systems:  Review of Systems   Constitutional: Negative for chills, diaphoresis and fever. HENT: Negative. Eyes: Negative for visual disturbance. Respiratory: Negative for cough and shortness of breath. Cardiovascular: Negative for chest pain. Gastrointestinal: Negative for abdominal pain, diarrhea, nausea and vomiting. Genitourinary: Negative for dysuria, flank pain, frequency and hematuria. Skin: Negative for color change, pallor and rash. Allergic/Immunologic: Negative for immunocompromised state. Neurological: Negative for dizziness, syncope, weakness and headaches. Hematological: Negative for adenopathy. Psychiatric/Behavioral: Negative for confusion. All other systems reviewed and are negative. Positives and Pertinent negatives as per HPI. Except as noted above in the ROS, problem specific ROS was completed and is negative. Physical Exam:  Physical Exam  Vitals signs and nursing note reviewed. Constitutional:       General: She is not in acute distress. Appearance: Normal appearance. She is well-developed. She is not toxic-appearing. HENT:      Head: Normocephalic and atraumatic. Eyes:      General: No scleral icterus. Conjunctiva/sclera: Conjunctivae normal.   Neck:      Musculoskeletal: Normal range of motion. Vascular: No JVD. Cardiovascular:      Rate and Rhythm: Normal rate and regular rhythm. Heart sounds: Normal heart sounds. Pulmonary:      Effort: Pulmonary effort is normal. No respiratory distress. Breath sounds: Normal breath sounds. Abdominal:      General: There is no distension. Palpations: Abdomen is soft. Abdomen is not rigid. Tenderness: There is no abdominal tenderness. Musculoskeletal: Normal range of motion. Skin:     General: Skin is warm and dry. Capillary Refill: Capillary refill takes less than 2 seconds. Findings: No rash. Neurological:      General: No focal deficit present. Mental Status: She is alert and oriented to person, place, and time.    Psychiatric:         Mood and Affect: Mood normal.         MEDICAL DECISION MAKING    Vitals:    Vitals:    11/24/20 1800 11/24/20 1801 11/24/20 1815   BP: (!) 144/79  (!) 154/71   Pulse:  79 81   Resp:  18 18   Temp:  98.2 °F (36.8 °C)    TempSrc:  Oral    SpO2: 93% 94% 95%   Weight:  134 lb 14.7 oz (61.2 kg)    Height:  5' 7\" (1.702 m) LABS:  Labs Reviewed   COMPREHENSIVE METABOLIC PANEL - Abnormal; Notable for the following components:       Result Value    Potassium 3.0 (*)     Glucose 112 (*)     BUN 29 (*)     GFR Non- 47 (*)     GFR African American 57 (*)     Alkaline Phosphatase 273 (*)     All other components within normal limits    Narrative:     Performed at:  [facility]  [address] [facility] [address] [address]   Phone [phone]   CBC WITH AUTO DIFFERENTIAL - Abnormal; Notable for the following components:    RBC 3.95 (*)     Monocytes Absolute 1.4 (*)     All other components within normal limits    Narrative:     Performed at:  [facility]  [address] [facility] [address] [address]   Phone [phone]   MAGNESIUM    Narrative:     Performed at:  [address] [facility] [address]   Phone [phone]   URINE RT REFLEX TO CULTURE        Remainder of labs reviewed and werenegative at this time or not returned at the time of this note. RADIOLOGY:   Non-plain film images such as CT, Ultrasound and MRI are read by the radiologist. ALYSIA uLna CNP have directly visualized the radiologic plain film image(s) with the below findings:        Interpretation per the Radiologist below, if available at the time of this note:    No orders to display        Mri Thoracic Spine Wo Contrast    Result Date: 11/14/2020  EXAMINATION: MRI OF Dalton Herr  11/13/2020 11:08 am TECHNIQUE: Multiplanar multisequence MRI of the thoracic spine was performed without the administration of intravenous contrast. COMPARISON: None.  HISTORY: ORDERING SYSTEM PROVIDED HISTORY: Nontraumatic compression fracture of T11 vertebra, initial encounter [facility]) TECHNOLOGIST PROVIDED HISTORY: Reason for exam:->thoracic pain Reason for Exam: Back pain, pre op, compression FX T7 Acuity: Unknown Additional signs and symptoms:  78 y.o. female with a PMH of osteoporosis, HTN, shingles, and stress incontinence kindly referred by [facility] ED for consultation regarding her mid back pain. She states her pain began after reaching trying to make her bed on 10/6/20. Her pain has steadily increased since then. She rates her back pain 10/10 and leg pain 0/10. She describes the pain as constant and sharp that is worse with any movement and better with rest. Denies radiating leg pain, numbness, tingling, or weakness. Denies new bowel or bladder dysfunction. She is currently in TLSO with some relief. She takes ultram, Tylenol and zanaflex. FINDINGS: BONES/ALIGNMENT: Subacute severe T7 vertebral body compression fracture with greater than 80% vertebral body height loss anteriorly. This has worsened from previous CT. Retropulsion of the posteroinferior aspect of T7 to the spinal canal by 6 mm. Bone marrow edema within the anteroinferior aspect of T6 vertebral body. No significant vertebral body height loss at this level. Remaining thoracic vertebral bodies are normal in height and demonstrate normal marrow signal pattern. Grade 1 anterolisthesis of C7 on T1 measures 2 mm. No additional spondylolisthesis. SPINAL CORD: Mild flattening of the thoracic spinal cord at T7-T8 level due to retropulsed fracture fragments. Remainder of the thoracic spinal cord has normal contour, caliber, and signal intensity. SOFT TISSUES: No paraspinal mass identified. DEGENERATIVE CHANGES: Moderate spinal canal stenosis at T7-T8. No additional thoracic spinal canal or thoracic neural foraminal stenosis. 1. Subacute appearing T7 vertebral body compression fracture with greater than 80% vertebral body height loss anteriorly, increased compared to prior CT. There is 6 mm retropulsion of fracture fragment into the spinal canal resulting in moderate spinal canal stenosis and mild compression of the thoracic spinal cord.  2. Bone marrow edema along the anteroinferior aspect of T6 could represent either degenerative bone marrow edema or nondisplaced inferior endplate fracture. 3. No abnormal thoracic spinal cord signal to suggest spinal cord edema. The findings were sent to the Radiology Results [address] at 12:55 pm on 11/13/2020to be communicated to a licensed caregiver. MEDICAL DECISION MAKING / ED COURSE:      PROCEDURES:   Procedures    None    Patient was given:  Medications   potassium chloride (KLOR-CON) extended release tablet 40 mEq (40 mEq Oral Given 11/24/20 1939)       Patient seen and examined today for low potassium. See HPI for patient presentation. Patient is hemodynamically stable, nontoxic, afebrile, and without tachycardia, tachypnea, and hypoxia. Physical exam as above. Well-appearing pleasant 26-year-old female lying in bed in no acute distress. Abdomen soft nontender. Lungs CTA bilaterally cardio exam is normal.  Potassium 3.0 with a normal magnesium. On my reexam she was sitting up in bed eating a turkey sandwich after taking oral potassium. She looks well. She was discharged home with oral potassium is she has hypokalemia likely due to diuretic use. Advised to follow-up with PCP tomorrow. At this time, the evidence for any other entities in the differential is insufficient to justify any further testing. This was explained to the patient. The patient was advised that persistent or worsening symptoms will require further evaluation. I discussed with Kiara Milligan and/or family the exam results, diagnosis, care, prognosis, reasons to return and the importance of follow up. Patient and/or family is in full agreement with plan and all questions have been answered. Specific discharge instructions explained, including reasons to return to the emergency department. Kiara Milligan is well appearing, non-toxic, and afebrile at the time of discharge.  Patient was instructed to follow up with primary care provider in 24-48 hours, and to instructed to return to ED immediately for any new or worsening concerns. Rufina Meng verbalized understanding and discharged home. The patient tolerated their visit well. I evaluated the patient. The physician was available for consultation as needed. The patient and / or the family were informed of the results of any tests, a time was given to answer questions, a plan was proposed and they agreed with plan. CLINICAL IMPRESSION:  1.  Hypokalemia        DISPOSITION Decision To Discharge 11/24/2020 07:51:57 PM      PATIENT REFERRED TO:  Sivan Mckeon MD  [address] [facility]  [address] [place] [address]  [phone]    Schedule an appointment as soon as possible for a visit       [address] [facility] Emergency Department  [address]  [phone]  Go to   As needed      DISCHARGE MEDICATIONS:  New Prescriptions    POTASSIUM CHLORIDE (KLOR-CON M) 20 MEQ EXTENDED RELEASE TABLET    Take 1 tablet by mouth daily       DISCONTINUED MEDICATIONS:  Discontinued Medications    No medications on file              (Please note the MDM and HPI sections of this note were completed with a voice recognition program.  Efforts were made to edit the dictations but occasionally words are mis-transcribed.)    Electronically signed, Muriel Lesches, APRN - CNP,           Muriel Lesches, APRN - CNP  11/24/20 2003

## 2020-11-25 NOTE — ANESTHESIA POSTPROCEDURE EVALUATION
[facility] Department of Anesthesiology  Post-Anesthesia Note       Name:  Meliton Curran                                  Age:  78 y.o. MRN:  [id number]     Last Vitals & Oxygen Saturation: BP (!) 143/74   Pulse 91   Temp 97.8 °F (36.6 °C) (Temporal)   Resp 20   Ht 5' 7\" (1.702 m)   Wt 138 lb (62.6 kg)   SpO2 90%   BMI 21.61 kg/m²   Patient Vitals for the past 4 hrs:   BP Temp Temp src Pulse Resp SpO2   11/25/20 1303 (!) 143/74 97.8 °F (36.6 °C) Temporal 91 20 90 %   11/25/20 1256 -- -- -- 87 20 94 %   11/25/20 1246 (!) 150/67 -- -- 87 18 96 %   11/25/20 1241 (!) 141/72 -- -- 87 20 100 %   11/25/20 1238 (!) 152/74 -- -- 87 19 100 %   11/25/20 1233 (!) 155/74 -- -- 87 22 100 %   11/25/20 1228 (!) 167/77 97.6 °F (36.4 °C) Temporal 87 22 100 %       Level of consciousness:  Awake, alert    Respiratory: Respirations easy, no distress. Stable. Cardiovascular: Hemodynamically stable. Hydration: Adequate. PONV: Adequately managed. Post-op pain: Adequately controlled. Post-op assessment: Tolerated anesthetic well without complication. Complications:  None.     Lucila Flores MD  November 25, 2020   1:07 PM

## 2020-11-25 NOTE — ANESTHESIA PRE PROCEDURE
[facility] Department of Anesthesiology  Pre-Anesthesia Evaluation/Consultation       Name:  Harley Hale  : 1940  Age:  78 y.o.                                            MRN:  [id number]  Date: 2020           Surgeon: Surgeon(s):  Nahid Cherry MD    Procedure: Procedure(s):  T7 KYPHOPLASTY WITH BONE BIOPSY     Allergies   Allergen Reactions    Adhesive Tape Other (See Comments)     Can cause irritation when on awhile    Codeine      GI upset    Iodine Hives    Lisinopril      Angioedema       Patient Active Problem List   Diagnosis    Moderate persistent asthma without complication    Essential hypertension    Renal insufficiency    Cataract    Hypercholesterolemia LDL goal < 160    Stress incontinence    Macular degeneration    Psoriasis    Osteoporosis- DXA -3.2 (), -2.9 (), -3.3 ()    Osteoarthritis    IBS (irritable bowel syndrome)    Colon polyp- stop due to age   Piotr Loser Tobacco abuse    Pulmonary nodule, right- stbale, no longer monitoring    Frequent PVCs    COPD (chronic obstructive pulmonary disease) (HCC)    Eczema of right eyelid    Adjustment disorder with anxious mood    Sciatica    Low back pain    Venous insufficiency of both lower extremities    Uterovaginal prolapse    Non-recurrent unilateral inguinal hernia without obstruction or gangrene    Hyperglycemia    Chronic constipation    Left inguinal hernia    Intention tremor    Compression fracture of T7 vertebra (HCC)    Senile osteoporosis     Past Medical History:   Diagnosis Date    HTN (hypertension)     Hypercholesterolemia     MAC (mycobacterium avium-intracellulare complex)     Osteoarthritis     Osteopetrosis     Papanicolaou smear of cervix with atypical squamous cells of undetermined significance (ASC-US)     Shingles     Stress incontinence      Past Surgical History:   Procedure Laterality Date    BREAST SURGERY      CATARACT REMOVAL Left     CHOLECYSTECTOMY      INGUINAL HERNIA REPAIR Right 2015     Social History     Tobacco Use    Smoking status: Former Smoker     Packs/day: 0.50     Years: 65.00     Pack years: 32.50     Types: Cigarettes     Last attempt to quit: 10/6/2020     Years since quittin.1    Smokeless tobacco: Never Used    Tobacco comment: Advised to quit repeatedly, not interested   Substance Use Topics    Alcohol use: Yes     Comment: rare    Drug use: No     Medications  No current facility-administered medications on file prior to encounter. Current Outpatient Medications on File Prior to Encounter   Medication Sig Dispense Refill    traMADol (ULTRAM) 50 MG tablet Take 50 mg by mouth every 8 hours as needed for Pain.  docusate sodium (STOOL SOFTENER) 250 MG capsule Take 250 mg by mouth 2 times daily as needed for Constipation      tiotropium (SPIRIVA HANDIHALER) 18 MCG inhalation capsule INHALE THE ENTIRE CONTENTS OF 1 CAPSULE ONCE A DAY USING HANDIHALER DEVICE 90 capsule 0    hydroCHLOROthiazide (HYDRODIURIL) 25 MG tablet TAKE ONE TABLET BY MOUTH EVERY MORNING 90 tablet 0    tiZANidine (ZANAFLEX) 2 MG tablet Take 1 tablet by mouth every 6 hours as needed (Back pain and spasm) (Patient taking differently: Take 2 mg by mouth every 8 hours as needed (Back pain and spasm) ) 20 tablet 0    dilTIAZem (CARDIZEM CD) 120 MG extended release capsule TAKE ONE CAPSULE BY MOUTH TWICE A  capsule 1    theophylline (UNIPHYL) 400 MG extended release tablet TAKE ONE-HALF TABLET BY MOUTH TWICE A DAY 90 tablet 1    nabumetone (RELAFEN) 750 MG tablet TAKE ONE TABLET BY MOUTH DAILY 90 tablet 1    dicyclomine (BENTYL) 20 MG tablet TAKE 1 TABLET BY MOUTH EVERY 6 HOURS 360 tablet 0    acetaminophen (TYLENOL) 500 MG tablet Take 500 mg by mouth 3 times daily as needed.  aspirin 81 MG EC tablet Take 81 mg by mouth daily.         Multiple Vitamins-Minerals (PRESERVISION AREDS PO) Take by mouth daily      Multiple Vitamins-Minerals (MULTIVITAMIN ADULTS PO) Take by mouth      vitamin C (ASCORBIC ACID) 500 MG tablet Take 500 mg by mouth daily      albuterol sulfate  (90 Base) MCG/ACT inhaler INHALE TWO PUFFS BY MOUTH EVERY 4 HOURS AS NEEDED FOR WHEEZING 8.5 g 0    denosumab (PROLIA) 60 MG/ML SOSY SC injection Inject 1 mL into the skin once for 1 dose 1 mL 0    furosemide (LASIX) 20 MG tablet TAKE ONE TABLET BY MOUTH DAILY 90 tablet 0    fluticasone-salmeterol (WIXELA INHUB) 250-50 MCG/DOSE AEPB INHALE 1 PUFF INTO THE LUNGS TWICE DAILY 6 each 1     Current Facility-Administered Medications   Medication Dose Route Frequency Provider Last Rate Last Dose    0.9 % sodium chloride infusion   Intravenous Continuous Saintclair Amos, MD        sodium chloride flush 0.9 % injection 10 mL  10 mL Intravenous 2 times per day Saintclair Amos, MD        sodium chloride flush 0.9 % injection 10 mL  10 mL Intravenous PRN Saintclair Amos, MD        ceFAZolin (ANCEF) 2 g in dextrose 5 % 100 mL IVPB  2 g Intravenous Once Cameron Cobb MD         Vital Signs (Current)   Vitals:    20 0854   BP: 130/69   Pulse: 80   Resp: 17   Temp: 97.7 °F (36.5 °C)   SpO2: 96%     Vital Signs Statistics (for past 48 hrs)     Temp  Av.8 °F (36.6 °C)  Min: 97.5 °F (36.4 °C)   Min taken time: 20 1402  Max: 98.2 °F (36.8 °C)   Max taken time: 20 1801  Pulse  Av.5  Min: 66   Min taken time: 20 1402  Max: 81   Max taken time: 20 1815  Resp  Av.8  Min: 14   Min taken time: 20 1402  Max: 18   Max taken time: 20 1815  BP  Min: 102/58   Min taken time: 20 1402  Max: 154/71   Max taken time: 20 1815  MAP (mmHg)  Av.5  Min: 87   Min taken time: 20 1815  Max: 94   Max taken time: 20 1800  SpO2  Av.6 %  Min: 93 %   Min taken time: 20 1800  Max: 96 %   Max taken time: 20 0854    BP Readings from Last 3 Encounters:   20 130/69   20 (!) 154/71 11/24/20 (!) 102/58     BMI  Body mass index is 21.61 kg/m². Estimated body mass index is 21.61 kg/m² as calculated from the following:    Height as of this encounter: 5' 7\" (1.702 m). Weight as of this encounter: 138 lb (62.6 kg).     CBC   Lab Results   Component Value Date    WBC 9.1 11/24/2020    RBC 3.95 11/24/2020    HGB 12.7 11/24/2020    HCT 37.0 11/24/2020    MCV 93.6 11/24/2020    RDW 15.4 11/24/2020     11/24/2020     CMP    Lab Results   Component Value Date     11/24/2020    K 3.0 11/24/2020    CL 99 11/24/2020    CO2 25 11/24/2020    BUN 29 11/24/2020    CREATININE 1.1 11/24/2020    GFRAA 57 11/24/2020    GFRAA >60 05/16/2013    AGRATIO 1.1 11/24/2020    LABGLOM 47 11/24/2020    LABGLOM 54.3 07/19/2011    GLUCOSE 112 11/24/2020    GLUCOSE 99 07/19/2011    PROT 7.4 11/24/2020    PROT 7.4 08/16/2012    CALCIUM 10.3 11/24/2020    BILITOT 0.3 11/24/2020    ALKPHOS 273 11/24/2020    AST 19 11/24/2020    ALT 17 11/24/2020     BMP    Lab Results   Component Value Date     11/24/2020    K 3.0 11/24/2020    CL 99 11/24/2020    CO2 25 11/24/2020    BUN 29 11/24/2020    CREATININE 1.1 11/24/2020    CALCIUM 10.3 11/24/2020    GFRAA 57 11/24/2020    GFRAA >60 05/16/2013    LABGLOM 47 11/24/2020    LABGLOM 54.3 07/19/2011    GLUCOSE 112 11/24/2020    GLUCOSE 99 07/19/2011     POCGlucose  Recent Labs     11/24/20  1529 11/24/20  1810   GLUCOSE 110* 112*      Coags    Lab Results   Component Value Date    PROTIME 11.3 11/13/2020    INR 0.97 11/13/2020    APTT 27.5 97/26/0704     HCG (If Applicable) No results found for: PREGTESTUR, PREGSERUM, HCG, HCGQUANT   ABGs No results found for: PHART, PO2ART, RUI6QWY, XXG3RUS, BEART, R9HIHQEC   Type & Screen (If Applicable)  No results found for: LABABO, LABRH                         BMI: Wt Readings from Last 3 Encounters:       NPO Status:   Date of last liquid consumption: 11/24/20   Time of last liquid consumption: 2300   Date of last solid food consumption: 11/24/20      Time of last solid consumption: 2300       Anesthesia Evaluation  Patient summary reviewed no history of anesthetic complications:   Airway: Mallampati: III  TM distance: >3 FB   Neck ROM: full   Dental:    (+) upper dentures      Pulmonary:normal exam    (+) COPD:  asthma:                            Cardiovascular:  Exercise tolerance: poor (<4 METS),   (+) hypertension:, dysrhythmias (PVCs):,       ECG reviewed  Rhythm: regular  Rate: normal           Beta Blocker:  Dose within 24 Hrs         Neuro/Psych:   (+) neuromuscular disease:, psychiatric history:            GI/Hepatic/Renal: Neg GI/Hepatic/Renal ROS            Endo/Other: Negative Endo/Other ROS                    Abdominal:           Vascular: negative vascular ROS. Anesthesia Plan      general     ASA 3       Induction: intravenous. MIPS: Postoperative opioids intended and Prophylactic antiemetics administered. Anesthetic plan and risks discussed with patient and child/children. Plan discussed with CRNA. This pre-anesthesia assessment may be used as a history and physical.    DOS STAFF ADDENDUM:    Pt seen and examined, chart reviewed (including anesthesia, drug and allergy history). No interval changes to history and physical examination. Anesthetic plan, risks, benefits, alternatives, and personnel involved discussed with patient. Questions and concerns addressed. Patient(family) verbalized an understanding and agrees to proceed.       eBty Davila MD  November 25, 2020  9:12 AM

## 2020-11-25 NOTE — FLOWSHEET NOTE
11/25/20 0858   Encounter Summary   Services provided to: Patient and family together   Referral/Consult From: Oliver Arango [address]   (visit, completed [facility] docs 11/25 [facility])   Complexity of Encounter Low   Length of Encounter 30 minutes   Spiritual/Sikhism   Type Spiritual support   Assessment Calm; Approachable; Hopeful;Coping   Intervention Active listening;Explored feelings, thoughts, concerns; Discussed illness/injury and it's impact   Outcome Engaged in conversation;Coping; Hopeful   Advance Directives (For Healthcare)   Healthcare Directive Yes, patient has an advance directive for healthcare treatment   Type of Healthcare Directive Durable power of  for health care   Copy in Chart Yes, copy in chart  (copy in soft chart)   Chart Copy Status : Active   Date Reviewed and Current: 11/25/20   [address] power of    Healthcare Agent's Name Patria Essex  (alt: [facility], [address] [facility])   Healthcare Agent's Phone Number [phone]  Sherrell Santamaria [phone], [phone], Cheryle Christians [phone])   Electronically signed by Jessi Chavez on 11/25/2020 at 9:01 AM

## 2020-11-26 NOTE — OP NOTE
[address] [place]                                 OPERATIVE REPORT    PATIENT NAME: Aracelis Hernández                  :        1940  MED REC NO:   [id number]                          ROOM:  ACCOUNT NO:   [de-identified]                           ADMIT DATE: 2020  PROVIDER:     Cyndy Fernandes MD    DATE OF PROCEDURE:  2020    PREOPERATIVE DIAGNOSIS:  Age-related pathologic osteoporosis fracture of  T7. POSTOPERATIVE DIAGNOSIS:  Age-related pathologic osteoporosis fracture  of T7.    OPERATION PERFORMED:  1. Kyphoplasty, T7.  2.  Bone biopsy, T7.  3.  Percutaneous reduction of compression fracture, T7.  4.  Physician-directed and interpreted intraoperative AP and lateral  fluoroscopy. SURGEON:  Cyndy Fernandes MD    ANESTHESIA:  General endotracheal.    COMPLICATIONS:  None apparent. INDICATIONS:  The patient is an 43-year-old who presented with back  pain, failed conservative treatments. CT scan and MRI demonstrated an  acute compression fracture of T7. She elected to undergo kyphoplasty. OPERATIVE PROCEDURE:  After obtaining informed consent, she was taken to  the operating suite on 2020. General endotracheal intubation was  initiated. She was positioned prone onto a Markus table with care  taken to pad pressure points. Antibiotics were administered. Level was  localized intraoperatively with AP and lateral fluoroscopy, and puncture  incisions were planned. She was prepped and draped in typical surgical  fashion. Skin was infiltrated with lidocaine and epinephrine. Scalpel was used to make a puncture. Jamshidi needle was advanced  through the pedicle into the vertebral body of T7 on the left. Jamshidi  needle was placed for passage away of the right pedicle.   There was  several millimeters of discrepancy between AP and lateral imaging for  that pedicle, and a decision was made to not proceed with the Jamshidi  needle on that side. The left Jamshidi needle trocar was removed, and a  bone biopsy was obtained from T7. This was delivered off the field as a  specimen. Bone drill was advanced through the Jamshidi needle under fluoroscopy,  and then a kyphoplasty balloon was placed into the vertebral body of T7  from the left. This was inflated under fluoroscopy, and a percutaneous  reduction of the compression fracture was performed. The balloon was  deflated and after confirmation of deflation with fluoroscopy, the  balloon was removed. Kyphoplasty cement was then advanced through the  Jamshidi needle into the T7 vertebral body from the left. There was  filling of cement. After completion of placement of the cement, the Jamshidi needle trocar  was placed after final fluoroscopic images were obtained. After a  period of waiting, the Jamshidi needle was removed and the punctures  were cleaned and then dressed with Steri-Strips and Tegaderm. The  patient was extubated and transferred to recovery in hemodynamically  stable condition. There were no apparent complications. All counts  were correct. A time-out procedure was performed prior to incision. I  was present for the entire procedure. ESTIMATED BLOOD LOSS:  1 mL.         Kelvin Dunham MD    D: 11/25/2020 12:36:35       T: 11/25/2020 12:43:17     [email or web address]  Job#: [id number]     Doc#: [id number]    CC:

## 2020-12-09 RX ORDER — NABUMETONE 750 MG/1
TABLET, FILM COATED ORAL
Qty: 90 TABLET | Refills: 0 | Status: SHIPPED | OUTPATIENT
Start: 2020-12-09

## 2020-12-15 RX ORDER — TRAMADOL HYDROCHLORIDE 50 MG/1
TABLET ORAL
Qty: 90 TABLET | Refills: 0 | OUTPATIENT
Start: 2020-12-15

## 2020-12-15 RX ORDER — TIZANIDINE 4 MG/1
TABLET ORAL
Qty: 90 TABLET | Refills: 0 | OUTPATIENT
Start: 2020-12-15

## 2020-12-16 RX ORDER — TRAMADOL HYDROCHLORIDE 50 MG/1
50 TABLET ORAL EVERY 8 HOURS PRN
Qty: 90 TABLET | Refills: 0 | Status: SHIPPED | OUTPATIENT
Start: 2020-12-16 | End: 2021-01-15

## 2020-12-21 RX ORDER — TIZANIDINE 4 MG/1
TABLET ORAL
Qty: 90 TABLET | Refills: 0 | Status: SHIPPED | OUTPATIENT
Start: 2020-12-21 | End: 2021-02-01

## 2020-12-23 ENCOUNTER — HOSPITAL ENCOUNTER (OUTPATIENT)
Age: 80
Discharge: HOME | End: 2020-12-23
Payer: MEDICARE

## 2020-12-23 ENCOUNTER — HOSPITAL ENCOUNTER (OUTPATIENT)
Dept: GENERAL RADIOLOGY | Age: 80
Discharge: HOME | End: 2020-12-23
Payer: MEDICARE

## 2020-12-23 PROCEDURE — 84155 ASSAY OF PROTEIN SERUM: CPT

## 2020-12-23 PROCEDURE — 36415 COLL VENOUS BLD VENIPUNCTURE: CPT

## 2020-12-23 PROCEDURE — 84165 PROTEIN E-PHORESIS SERUM: CPT

## 2020-12-23 PROCEDURE — 82784 ASSAY IGA/IGD/IGG/IGM EACH: CPT

## 2020-12-23 PROCEDURE — 72070 X-RAY EXAM THORAC SPINE 2VWS: CPT

## 2020-12-29 RX ORDER — THEOPHYLLINE 400 MG/1
TABLET, EXTENDED RELEASE ORAL
Qty: 90 TABLET | Refills: 0 | Status: SHIPPED | OUTPATIENT
Start: 2020-12-29

## 2020-12-29 NOTE — TELEPHONE ENCOUNTER
I spoke with daughter and she [facility] and will try to figure out how often she is taking the diltiazem and check her B/P.  Daughter will call back tomorrow

## 2020-12-29 NOTE — TELEPHONE ENCOUNTER
yelena kan called in they are not real sure on how many she is taking     DOP is [employer] [phone]

## 2020-12-29 NOTE — TELEPHONE ENCOUNTER
Per OV notes on 11/24 patient was to hold morning dose of diltiazem due to low B/P but unclear if that was a one time thing . Order still reads twice daily.  I LM for patient to clarify what she is taking None

## 2020-12-30 RX ORDER — DILTIAZEM HYDROCHLORIDE 120 MG/1
CAPSULE, EXTENDED RELEASE ORAL
Qty: 90 CAPSULE | Refills: 0 | Status: ON HOLD
Start: 2020-12-30 | End: 2021-04-21 | Stop reason: HOSPADM

## 2020-12-30 NOTE — TELEPHONE ENCOUNTER
I spoke with daughter and patient. Patient is unsure but she thinks she has only been taking one per day of the diltiazem. I discussed with patient and daughter and they will set up meds for patient to make sure to take  the diltiazem at night. I advised to take B/P reading for a few days and to call back with readings next week. B/P today is 109/75 Pulse 82. Is this okay to change the order to once daily?

## 2020-12-31 LAB
IGA: 798 MG/DL (ref 70–400)
IGG: 544 MG/DL (ref 700–1600)
IGM: 72 MG/DL (ref 40–230)

## 2021-01-04 LAB — SPE/IFE INTERPRETATION: NORMAL

## 2021-01-05 LAB
ALBUMIN SERPL-MCNC: 3.2 G/DL (ref 3.1–4.9)
ALPHA-1-GLOBULIN: 0.4 G/DL (ref 0.2–0.4)
ALPHA-2-GLOBULIN: 1.1 G/DL (ref 0.4–1.1)
BETA GLOBULIN: 1.6 G/DL (ref 0.9–1.6)
GAMMA GLOBULIN: 0.6 G/DL (ref 0.6–1.8)
M SPIKE 1 SERUM PROTEIN ELEC: 0.6 G/DL
TOTAL PROTEIN: 6.9 G/DL (ref 6.4–8.2)

## 2021-01-19 ENCOUNTER — TELEPHONE (OUTPATIENT)
Dept: FAMILY MEDICINE CLINIC | Age: 81
End: 2021-01-19

## 2021-01-19 DIAGNOSIS — I87.2 VENOUS INSUFFICIENCY OF BOTH LOWER EXTREMITIES: ICD-10-CM

## 2021-01-19 DIAGNOSIS — N39.45 CONTINUOUS LEAKAGE OF URINE: ICD-10-CM

## 2021-01-19 DIAGNOSIS — I10 ESSENTIAL HYPERTENSION: ICD-10-CM

## 2021-01-19 DIAGNOSIS — N81.4 UTEROVAGINAL PROLAPSE: Primary | ICD-10-CM

## 2021-01-19 RX ORDER — UNDERPADS 23" X 36"
EACH MISCELLANEOUS
Qty: 450 EACH | Refills: 3 | Status: SHIPPED | OUTPATIENT
Start: 2021-01-19

## 2021-01-19 RX ORDER — UNDERPADS 23" X 36"
EACH MISCELLANEOUS
Qty: 100 EACH | Refills: 5 | Status: CANCELLED | OUTPATIENT
Start: 2021-01-19

## 2021-01-19 NOTE — TELEPHONE ENCOUNTER
Patient daughter calling would like to see if dr. Sally Lerner could call in prescription for pull ups. She has been using pull ups awhile now but now going through two or three packs a month and also using pads. Patient daughter would like these sent to [facility] to deliver.    please advise

## 2021-01-20 RX ORDER — FUROSEMIDE 20 MG/1
TABLET ORAL
Qty: 90 TABLET | Refills: 0 | Status: SHIPPED | OUTPATIENT
Start: 2021-01-20

## 2021-01-20 RX ORDER — HYDROCHLOROTHIAZIDE 25 MG/1
TABLET ORAL
Qty: 90 TABLET | Refills: 0 | Status: ON HOLD
Start: 2021-01-20 | End: 2021-04-15 | Stop reason: HOSPADM

## 2021-01-30 DIAGNOSIS — J45.40 MODERATE PERSISTENT ASTHMA WITHOUT COMPLICATION: ICD-10-CM

## 2021-01-30 DIAGNOSIS — J43.9 PULMONARY EMPHYSEMA, UNSPECIFIED EMPHYSEMA TYPE (HCC): ICD-10-CM

## 2021-02-01 DIAGNOSIS — M54.30 SCIATICA, UNSPECIFIED LATERALITY: Primary | ICD-10-CM

## 2021-02-01 RX ORDER — TRAMADOL HYDROCHLORIDE 50 MG/1
TABLET ORAL
Qty: 90 TABLET | Refills: 0 | Status: SHIPPED | OUTPATIENT
Start: 2021-02-01 | End: 2021-03-03

## 2021-02-01 RX ORDER — ALBUTEROL SULFATE 90 UG/1
AEROSOL, METERED RESPIRATORY (INHALATION)
Qty: 8.5 G | Refills: 0 | Status: SHIPPED | OUTPATIENT
Start: 2021-02-01

## 2021-02-01 RX ORDER — TIZANIDINE 4 MG/1
TABLET ORAL
Qty: 90 TABLET | Refills: 0 | Status: SHIPPED | OUTPATIENT
Start: 2021-02-01

## 2021-02-05 ENCOUNTER — HOSPITAL ENCOUNTER (OUTPATIENT)
Dept: GENERAL RADIOLOGY | Age: 81
Discharge: HOME | End: 2021-02-05
Payer: MEDICARE

## 2021-02-05 ENCOUNTER — HOSPITAL ENCOUNTER (OUTPATIENT)
Age: 81
Discharge: HOME | End: 2021-02-05
Payer: MEDICARE

## 2021-02-05 DIAGNOSIS — M80.08XS AGE-REL OSTEOPOR W CURRENT PATH FRACTURE, VERTEB, SEQUELA: ICD-10-CM

## 2021-02-05 PROCEDURE — 72070 X-RAY EXAM THORAC SPINE 2VWS: CPT

## 2021-02-12 ENCOUNTER — HOSPITAL ENCOUNTER (OUTPATIENT)
Dept: CT IMAGING | Age: 81
Discharge: HOME | End: 2021-02-12
Payer: MEDICARE

## 2021-02-12 VITALS
WEIGHT: 138 LBS | TEMPERATURE: 98.5 F | OXYGEN SATURATION: 92 % | HEIGHT: 67 IN | HEART RATE: 83 BPM | SYSTOLIC BLOOD PRESSURE: 120 MMHG | BODY MASS INDEX: 21.66 KG/M2 | RESPIRATION RATE: 14 BRPM | DIASTOLIC BLOOD PRESSURE: 72 MMHG

## 2021-02-12 DIAGNOSIS — D47.2 MONOCLONAL GAMMOPATHY: ICD-10-CM

## 2021-02-12 LAB
BASOPHILS ABSOLUTE: 0 K/UL (ref 0–0.2)
BASOPHILS RELATIVE PERCENT: 0.6 %
EOSINOPHILS ABSOLUTE: 0.1 K/UL (ref 0–0.6)
EOSINOPHILS RELATIVE PERCENT: 2.1 %
HCT VFR BLD CALC: 39.3 % (ref 36–48)
HEMOGLOBIN: 13 G/DL (ref 12–16)
IMMATURE RETIC FRACT: 0.45 (ref 0.21–0.37)
INR BLD: 0.93 (ref 0.86–1.14)
LYMPHOCYTES ABSOLUTE: 1 K/UL (ref 1–5.1)
LYMPHOCYTES RELATIVE PERCENT: 15 %
MCH RBC QN AUTO: 31.2 PG (ref 26–34)
MCHC RBC AUTO-ENTMCNC: 33.1 G/DL (ref 31–36)
MCV RBC AUTO: 94 FL (ref 80–100)
MONOCYTES ABSOLUTE: 0.9 K/UL (ref 0–1.3)
MONOCYTES RELATIVE PERCENT: 13.2 %
NEUTROPHILS ABSOLUTE: 4.8 K/UL (ref 1.7–7.7)
NEUTROPHILS RELATIVE PERCENT: 69.1 %
PDW BLD-RTO: 15.1 % (ref 12.4–15.4)
PLATELET # BLD: 277 K/UL (ref 135–450)
PMV BLD AUTO: 8 FL (ref 5–10.5)
PROTHROMBIN TIME: 10.8 SEC (ref 10–13.2)
RBC # BLD: 4.18 M/UL (ref 4–5.2)
RETICULOCYTE ABSOLUTE COUNT: 0.04 M/UL (ref 0.02–0.1)
RETICULOCYTE COUNT PCT: 0.99 % (ref 0.5–2.18)
WBC # BLD: 6.9 K/UL (ref 4–11)

## 2021-02-12 PROCEDURE — 85045 AUTOMATED RETICULOCYTE COUNT: CPT

## 2021-02-12 PROCEDURE — 88334 PATH CONSLTJ SURG CYTO XM EA: CPT

## 2021-02-12 PROCEDURE — 88313 SPECIAL STAINS GROUP 2: CPT

## 2021-02-12 PROCEDURE — 88305 TISSUE EXAM BY PATHOLOGIST: CPT

## 2021-02-12 PROCEDURE — 88311 DECALCIFY TISSUE: CPT

## 2021-02-12 PROCEDURE — 6360000002 HC RX W HCPCS: Performed by: RADIOLOGY

## 2021-02-12 PROCEDURE — 88185 FLOWCYTOMETRY/TC ADD-ON: CPT

## 2021-02-12 PROCEDURE — 85025 COMPLETE CBC W/AUTO DIFF WBC: CPT

## 2021-02-12 PROCEDURE — 88341 IMHCHEM/IMCYTCHM EA ADD ANTB: CPT

## 2021-02-12 PROCEDURE — 88342 IMHCHEM/IMCYTCHM 1ST ANTB: CPT

## 2021-02-12 PROCEDURE — 7100000011 HC PHASE II RECOVERY - ADDTL 15 MIN

## 2021-02-12 PROCEDURE — 85610 PROTHROMBIN TIME: CPT

## 2021-02-12 PROCEDURE — 2709999900 CT BIOPSY BONE MARROW

## 2021-02-12 PROCEDURE — 7100000010 HC PHASE II RECOVERY - FIRST 15 MIN

## 2021-02-12 PROCEDURE — 36415 COLL VENOUS BLD VENIPUNCTURE: CPT

## 2021-02-12 PROCEDURE — 88184 FLOWCYTOMETRY/ TC 1 MARKER: CPT

## 2021-02-12 PROCEDURE — 88333 PATH CONSLTJ SURG CYTO XM 1: CPT

## 2021-02-12 PROCEDURE — 77012 CT SCAN FOR NEEDLE BIOPSY: CPT

## 2021-02-12 RX ORDER — ONDANSETRON 2 MG/ML
4 INJECTION INTRAMUSCULAR; INTRAVENOUS EVERY 8 HOURS PRN
Status: DISCONTINUED | OUTPATIENT
Start: 2021-02-12 | End: 2021-02-13 | Stop reason: HOSPADM

## 2021-02-12 RX ORDER — ACETAMINOPHEN 325 MG/1
650 TABLET ORAL EVERY 4 HOURS PRN
Status: DISCONTINUED | OUTPATIENT
Start: 2021-02-12 | End: 2021-02-13 | Stop reason: HOSPADM

## 2021-02-12 RX ORDER — FENTANYL CITRATE 50 UG/ML
INJECTION, SOLUTION INTRAMUSCULAR; INTRAVENOUS DAILY PRN
Status: COMPLETED | OUTPATIENT
Start: 2021-02-12 | End: 2021-02-12

## 2021-02-12 RX ORDER — MIDAZOLAM HYDROCHLORIDE 1 MG/ML
INJECTION INTRAMUSCULAR; INTRAVENOUS DAILY PRN
Status: COMPLETED | OUTPATIENT
Start: 2021-02-12 | End: 2021-02-12

## 2021-02-12 RX ADMIN — MIDAZOLAM 0.5 MG: 1 INJECTION INTRAMUSCULAR; INTRAVENOUS at 10:44

## 2021-02-12 RX ADMIN — FENTANYL CITRATE 25 MCG: 50 INJECTION INTRAMUSCULAR; INTRAVENOUS at 10:44

## 2021-02-12 ASSESSMENT — PAIN SCALES - GENERAL
PAINLEVEL_OUTOF10: 0
PAINLEVEL_OUTOF10: 0

## 2021-02-12 ASSESSMENT — PAIN - FUNCTIONAL ASSESSMENT: PAIN_FUNCTIONAL_ASSESSMENT: 0-10

## 2021-02-12 NOTE — BRIEF OP NOTE
Brief Postoperative Note    Cherise Callejas  YOB: 1940  [id number]    Pre-operative Diagnosis: monoclonal gammopathy    Post-operative Diagnosis: Same    Procedure: bone marrow bx    Anesthesia: Moderate Sedation    Surgeons/Assistants:  Vero    Estimated Blood Loss: less than 50     Complications: None    Specimens: Was Obtained: bone marrow    Findings: no complications    Electronically signed by Nelia Canada MD on 2/12/2021 at 10:51 AM

## 2021-02-12 NOTE — PROGRESS NOTES
Pt alert. Denies pain at present. VSS. Discharge time 1200. Repositioned and HOB up. Given coffee and juice and cookies. Called for daughter. Given call light.

## 2021-02-12 NOTE — PRE SEDATION
Sedation Pre-Procedure Note    Patient Name: Karyn Paz   YOB: 1940  Room/Bed: Room/bed info not found  Medical Record Number: [id number]  Date: 2/12/2021   Time: 10:36 AM       Indication:  Bone marrow bx    Consent: I have discussed with the patient and/or the patient representative the indication, alternatives, and the possible risks and/or complications of the planned procedure and the anesthesia methods. The patient and/or patient representative appear to understand and agree to proceed. Vital Signs:   Vitals:    02/12/21 1024   BP: (!) 161/69   Pulse: 81   Resp: 17   Temp:    SpO2: 96%       Past Medical History:   has a past medical history of HTN (hypertension), Hypercholesterolemia, MAC (mycobacterium avium-intracellulare complex), Osteoarthritis, Osteopetrosis, Papanicolaou smear of cervix with atypical squamous cells of undetermined significance (ASC-US), Shingles, and Stress incontinence. Past Surgical History:   has a past surgical history that includes Cholecystectomy; Breast surgery; Cataract removal (Left, 2009); Inguinal hernia repair (Right, 2015); and Spine surgery (N/A, 11/25/2020). Medications:   Scheduled Meds:   Continuous Infusions:   PRN Meds:   Home Meds:   Prior to Admission medications    Medication Sig Start Date End Date Taking?  Authorizing Provider   fluticasone-salmeterol (ADVAIR) 250-50 MCG/DOSE AEPB INHALE ONE PUFF BY MOUTH TWICE A DAY 2/1/21  Yes Eri Valerio MD   albuterol sulfate  (90 Base) MCG/ACT inhaler INHALE 2 PUFF(S) BY MOUTH EVERY 4 HOURS AS NEEDED FOR WHEEZING 2/1/21  Yes Eri Valerio MD   tiZANidine (ZANAFLEX) 4 MG tablet TAKE ONE TABLET BY MOUTH THREE TIMES A DAY AS NEEDED 2/1/21  Yes Bay Bryant MD   traMADol (ULTRAM) 50 MG tablet TAKE ONE TABLET BY MOUTH EVERY 8 HOURS AS NEEDED FOR PAIN REDUCE DOSES TAKEN AS PAIN BECOMES MANAGEABLE 2/1/21 3/3/21 Yes Ean Martin MD hydroCHLOROthiazide (HYDRODIURIL) 25 MG tablet TAKE ONE TABLET BY MOUTH EVERY MORNING 1/20/21  Yes Devora Escobedo MD   dilTIAZem [facility]) 120 MG extended release capsule TAKE ONE CAPSULE BY MOUTH once daily in the evening 12/30/20  Yes Devora Escobedo MD   theophylline (UNIPHYL) 400 MG extended release tablet TAKE 1/2 TABLET BY MOUTH TWICE A DAY 12/29/20  Yes Devora Escobedo MD   nabumetone (RELAFEN) 750 MG tablet TAKE ONE TABLET BY MOUTH DAILY 12/9/20  Yes Devora Escobedo MD   levoFLOXacin (LEVAQUIN) 750 MG tablet Take 750 mg by mouth daily 11/20/20  Yes Historical Provider, MD   potassium chloride (KLOR-CON M) 20 MEQ extended release tablet Take 1 tablet by mouth daily 11/24/20  Yes ALYSIA Lott CNP   metoprolol succinate (TOPROL XL) 100 MG extended release tablet TAKE ONE TABLET BY MOUTH DAILY **CALL TO MAKE APPOINTMENT WITH DOCTOR** 11/23/20  Yes Devora Escobedo MD   buPROPion [facility]) 150 MG extended release tablet TAKE ONE TABLET BY MOUTH DAILY 11/23/20  Yes Devora Escobedo MD   Multiple Vitamins-Minerals (MULTIVITAMIN ADULTS PO) Take by mouth   Yes Historical Provider, MD   vitamin C (ASCORBIC ACID) 500 MG tablet Take 500 mg by mouth daily   Yes Historical Provider, MD   tiotropium (SPIRIVA HANDIHALER) 18 MCG inhalation capsule INHALE THE ENTIRE CONTENTS OF 1 CAPSULE ONCE A DAY USING HANDIHALER DEVICE 10/16/20  Yes Devora Escobedo MD   dicyclomine (BENTYL) 20 MG tablet TAKE 1 TABLET BY MOUTH EVERY 6 HOURS 12/30/19  Yes Anupama Bryant MD   acetaminophen (TYLENOL) 500 MG tablet Take 500 mg by mouth 3 times daily as needed. Yes Historical Provider, MD   furosemide (LASIX) 20 MG tablet TAKE ONE TABLET BY MOUTH DAILY 1/20/21   Devora Escobedo MD   Incontinence Supply Disposable (INCONTINENCE BRIEF LARGE) MISC As needed.  Approximately 150 per month 1/19/21   Devroa Escobedo MD   Multiple Vitamins-Minerals (PRESERVISION AREDS PO) Take by mouth daily    Historical Provider, MD docusate sodium (STOOL SOFTENER) 250 MG capsule Take 250 mg by mouth 2 times daily as needed for Constipation    Historical Provider, MD   denosumab (PROLIA) 60 MG/ML SOSY SC injection Inject 1 mL into the skin once for 1 dose 10/22/20 10/22/20  ALYSIA Levin - CNP   dilTIAZem (CARDIZEM CD) 120 MG extended release capsule TAKE ONE CAPSULE BY MOUTH TWICE A DAY 7/7/20 12/30/20  Devora Escobedo MD   aspirin 81 MG EC tablet Take 81 mg by mouth daily. Historical Provider, MD     Coumadin Use Last 7 Days:  no  Antiplatelet drug therapy use last 7 days: no  Other anticoagulant use last 7 days: no  Additional Medication Information:        Pre-Sedation Documentation and Exam:   I have reviewed the patient's history and review of systems.     Mallampati Airway Assessment:  normal neck range of motion    Prior History of Anesthesia Complications:   none    ASA Classification:  Class 2 - A normal healthy patient with mild systemic disease    Sedation/ Anesthesia Plan:   intravenous sedation    Medications Planned:   midazolam (Versed) intravenously and fentanyl intravenously    Patient is an appropriate candidate for plan of sedation: yes    Electronically signed by Esther Heath MD on 2/12/2021 at 10:36 AM

## 2021-02-12 NOTE — PROGRESS NOTES
Pt alert. Denies pain. Tolerates PO. Dressing to right lower back remains intact, no change to spot of drainage on dressing. Daughter present.

## 2021-02-22 LAB
Lab: NORMAL
REPORT: NORMAL
THIS TEST SENT TO: NORMAL

## 2021-02-26 DIAGNOSIS — F43.22 ADJUSTMENT DISORDER WITH ANXIOUS MOOD: ICD-10-CM

## 2021-02-26 DIAGNOSIS — I10 ESSENTIAL HYPERTENSION: ICD-10-CM

## 2021-02-26 RX ORDER — BUPROPION HYDROCHLORIDE 150 MG/1
TABLET, EXTENDED RELEASE ORAL
Qty: 90 TABLET | Refills: 0 | Status: ON HOLD | OUTPATIENT
Start: 2021-02-26 | End: 2021-04-19 | Stop reason: ALTCHOICE

## 2021-02-26 RX ORDER — METOPROLOL SUCCINATE 100 MG/1
TABLET, EXTENDED RELEASE ORAL
Qty: 90 TABLET | Refills: 0 | Status: ON HOLD
Start: 2021-02-26 | End: 2021-04-21 | Stop reason: HOSPADM

## 2021-03-10 ENCOUNTER — TELEPHONE (OUTPATIENT)
Dept: FAMILY MEDICINE CLINIC | Age: 81
End: 2021-03-10

## 2021-03-10 NOTE — TELEPHONE ENCOUNTER
Zahida Maria from [facility] called asking if this pt had a bone marrow scan done for a back fracture she had.      Please Avani Blankenship [address]  Phone [phone]  Fax - [phone]

## 2021-03-11 NOTE — TELEPHONE ENCOUNTER
[facility] called in stated its called a bone scan not a bone marrow scan  Wants to speak to [address]

## 2021-03-12 ENCOUNTER — HOSPITAL ENCOUNTER (OUTPATIENT)
Dept: GENERAL RADIOLOGY | Age: 81
Discharge: HOME | End: 2021-03-12
Payer: MEDICARE

## 2021-03-12 ENCOUNTER — TELEPHONE (OUTPATIENT)
Dept: FAMILY MEDICINE CLINIC | Age: 81
End: 2021-03-12

## 2021-03-12 ENCOUNTER — HOSPITAL ENCOUNTER (OUTPATIENT)
Age: 81
Discharge: HOME | End: 2021-03-12
Payer: MEDICARE

## 2021-03-12 DIAGNOSIS — M80.08XA FRACTURE OF VERTEBRA DUE TO OSTEOPOROSIS, INITIAL ENCOUNTER (HCC): ICD-10-CM

## 2021-03-12 PROCEDURE — 72080 X-RAY EXAM THORACOLMB 2/> VW: CPT

## 2021-04-02 ENCOUNTER — HOSPITAL ENCOUNTER (INPATIENT)
Age: 81
LOS: 13 days | Discharge: NURSING, REHAB OR LONG-TERM CARE | DRG: 166 | End: 2021-04-15
Attending: STUDENT IN AN ORGANIZED HEALTH CARE EDUCATION/TRAINING PROGRAM | Admitting: INTERNAL MEDICINE
Payer: MEDICARE

## 2021-04-02 ENCOUNTER — APPOINTMENT (OUTPATIENT)
Dept: GENERAL RADIOLOGY | Age: 81
DRG: 166 | End: 2021-04-02
Payer: MEDICARE

## 2021-04-02 DIAGNOSIS — R06.02 SHORTNESS OF BREATH: ICD-10-CM

## 2021-04-02 DIAGNOSIS — J44.1 COPD EXACERBATION (HCC): Primary | ICD-10-CM

## 2021-04-02 DIAGNOSIS — R09.02 HYPOXIA: ICD-10-CM

## 2021-04-02 DIAGNOSIS — R53.83 FATIGUE, UNSPECIFIED TYPE: ICD-10-CM

## 2021-04-02 DIAGNOSIS — S22.070A CLOSED WEDGE COMPRESSION FRACTURE OF T9 VERTEBRA, INITIAL ENCOUNTER (HCC): ICD-10-CM

## 2021-04-02 LAB
A/G RATIO: 0.9 (ref 1.1–2.2)
ALBUMIN SERPL-MCNC: 3.5 G/DL (ref 3.4–5)
ALP BLD-CCNC: 157 U/L (ref 40–129)
ALT SERPL-CCNC: <5 U/L (ref 10–40)
ANION GAP SERPL CALCULATED.3IONS-SCNC: 9 MMOL/L (ref 3–16)
AST SERPL-CCNC: 11 U/L (ref 15–37)
BASOPHILS ABSOLUTE: 0 K/UL (ref 0–0.2)
BASOPHILS RELATIVE PERCENT: 0.6 %
BILIRUB SERPL-MCNC: 0.3 MG/DL (ref 0–1)
BUN BLDV-MCNC: 32 MG/DL (ref 7–20)
CALCIUM SERPL-MCNC: 10.1 MG/DL (ref 8.3–10.6)
CHLORIDE BLD-SCNC: 92 MMOL/L (ref 99–110)
CO2: 39 MMOL/L (ref 21–32)
CREAT SERPL-MCNC: 1 MG/DL (ref 0.6–1.2)
EOSINOPHILS ABSOLUTE: 0.1 K/UL (ref 0–0.6)
EOSINOPHILS RELATIVE PERCENT: 1.5 %
GFR AFRICAN AMERICAN: >60
GFR NON-AFRICAN AMERICAN: 53
GLOBULIN: 4 G/DL
GLUCOSE BLD-MCNC: 104 MG/DL (ref 70–99)
HCT VFR BLD CALC: 38.1 % (ref 36–48)
HEMOGLOBIN: 12.5 G/DL (ref 12–16)
LACTIC ACID: 1.9 MMOL/L (ref 0.4–2)
LYMPHOCYTES ABSOLUTE: 0.3 K/UL (ref 1–5.1)
LYMPHOCYTES RELATIVE PERCENT: 3.9 %
MCH RBC QN AUTO: 30.2 PG (ref 26–34)
MCHC RBC AUTO-ENTMCNC: 32.9 G/DL (ref 31–36)
MCV RBC AUTO: 91.6 FL (ref 80–100)
MONOCYTES ABSOLUTE: 1 K/UL (ref 0–1.3)
MONOCYTES RELATIVE PERCENT: 11.9 %
NEUTROPHILS ABSOLUTE: 6.5 K/UL (ref 1.7–7.7)
NEUTROPHILS RELATIVE PERCENT: 82.1 %
PDW BLD-RTO: 15.3 % (ref 12.4–15.4)
PLATELET # BLD: 391 K/UL (ref 135–450)
PMV BLD AUTO: 7.6 FL (ref 5–10.5)
POTASSIUM REFLEX MAGNESIUM: 3.7 MMOL/L (ref 3.5–5.1)
PRO-BNP: 1024 PG/ML (ref 0–449)
PROCALCITONIN: 0.06 NG/ML (ref 0–0.15)
RBC # BLD: 4.15 M/UL (ref 4–5.2)
SARS-COV-2, NAAT: NOT DETECTED
SODIUM BLD-SCNC: 140 MMOL/L (ref 136–145)
THEOPHYLLINE LEVEL: 9.9 UG/ML (ref 8–20)
TOTAL PROTEIN: 7.5 G/DL (ref 6.4–8.2)
TROPONIN: <0.01 NG/ML
WBC # BLD: 8 K/UL (ref 4–11)

## 2021-04-02 PROCEDURE — 93005 ELECTROCARDIOGRAM TRACING: CPT | Performed by: NURSE PRACTITIONER

## 2021-04-02 PROCEDURE — 6360000002 HC RX W HCPCS: Performed by: NURSE PRACTITIONER

## 2021-04-02 PROCEDURE — 2700000000 HC OXYGEN THERAPY PER DAY

## 2021-04-02 PROCEDURE — 83605 ASSAY OF LACTIC ACID: CPT

## 2021-04-02 PROCEDURE — 94640 AIRWAY INHALATION TREATMENT: CPT

## 2021-04-02 PROCEDURE — 96374 THER/PROPH/DIAG INJ IV PUSH: CPT

## 2021-04-02 PROCEDURE — 99284 EMERGENCY DEPT VISIT MOD MDM: CPT

## 2021-04-02 PROCEDURE — 36415 COLL VENOUS BLD VENIPUNCTURE: CPT

## 2021-04-02 PROCEDURE — 6370000000 HC RX 637 (ALT 250 FOR IP): Performed by: NURSE PRACTITIONER

## 2021-04-02 PROCEDURE — 80198 ASSAY OF THEOPHYLLINE: CPT

## 2021-04-02 PROCEDURE — 85025 COMPLETE CBC W/AUTO DIFF WBC: CPT

## 2021-04-02 PROCEDURE — 94761 N-INVAS EAR/PLS OXIMETRY MLT: CPT

## 2021-04-02 PROCEDURE — 1200000000 HC SEMI PRIVATE

## 2021-04-02 PROCEDURE — 83880 ASSAY OF NATRIURETIC PEPTIDE: CPT

## 2021-04-02 PROCEDURE — 84484 ASSAY OF TROPONIN QUANT: CPT

## 2021-04-02 PROCEDURE — 87635 SARS-COV-2 COVID-19 AMP PRB: CPT

## 2021-04-02 PROCEDURE — 84145 PROCALCITONIN (PCT): CPT

## 2021-04-02 PROCEDURE — 71045 X-RAY EXAM CHEST 1 VIEW: CPT

## 2021-04-02 PROCEDURE — 80053 COMPREHEN METABOLIC PANEL: CPT

## 2021-04-02 RX ORDER — IPRATROPIUM BROMIDE AND ALBUTEROL SULFATE 2.5; .5 MG/3ML; MG/3ML
1 SOLUTION RESPIRATORY (INHALATION) ONCE
Status: COMPLETED | OUTPATIENT
Start: 2021-04-02 | End: 2021-04-02

## 2021-04-02 RX ORDER — METHYLPREDNISOLONE SODIUM SUCCINATE 125 MG/2ML
125 INJECTION, POWDER, LYOPHILIZED, FOR SOLUTION INTRAMUSCULAR; INTRAVENOUS ONCE
Status: COMPLETED | OUTPATIENT
Start: 2021-04-02 | End: 2021-04-02

## 2021-04-02 RX ADMIN — IPRATROPIUM BROMIDE AND ALBUTEROL SULFATE 1 AMPULE: .5; 3 SOLUTION RESPIRATORY (INHALATION) at 22:39

## 2021-04-02 RX ADMIN — METHYLPREDNISOLONE SODIUM SUCCINATE 125 MG: 125 INJECTION, POWDER, FOR SOLUTION INTRAMUSCULAR; INTRAVENOUS at 22:50

## 2021-04-03 PROBLEM — R63.0 POOR APPETITE: Status: ACTIVE | Noted: 2021-04-03

## 2021-04-03 PROBLEM — R53.83 FATIGUE: Status: ACTIVE | Noted: 2021-04-03

## 2021-04-03 PROBLEM — J96.01 ACUTE RESPIRATORY FAILURE WITH HYPOXIA (HCC): Status: ACTIVE | Noted: 2021-04-03

## 2021-04-03 PROBLEM — R53.1 GENERALIZED WEAKNESS: Status: ACTIVE | Noted: 2021-04-03

## 2021-04-03 LAB
A/G RATIO: 0.8 (ref 1.1–2.2)
ALBUMIN SERPL-MCNC: 3.1 G/DL (ref 3.4–5)
ALP BLD-CCNC: 142 U/L (ref 40–129)
ALT SERPL-CCNC: <5 U/L (ref 10–40)
ANION GAP SERPL CALCULATED.3IONS-SCNC: 6 MMOL/L (ref 3–16)
AST SERPL-CCNC: 10 U/L (ref 15–37)
BACTERIA: ABNORMAL /HPF
BILIRUB SERPL-MCNC: 0.3 MG/DL (ref 0–1)
BILIRUBIN URINE: ABNORMAL
BLOOD, URINE: NEGATIVE
BUN BLDV-MCNC: 36 MG/DL (ref 7–20)
CALCIUM SERPL-MCNC: 9.5 MG/DL (ref 8.3–10.6)
CHLORIDE BLD-SCNC: 94 MMOL/L (ref 99–110)
CLARITY: ABNORMAL
CO2: 41 MMOL/L (ref 21–32)
COLOR: ABNORMAL
CREAT SERPL-MCNC: 1 MG/DL (ref 0.6–1.2)
EKG ATRIAL RATE: 75 BPM
EKG DIAGNOSIS: NORMAL
EKG P AXIS: 101 DEGREES
EKG P-R INTERVAL: 140 MS
EKG Q-T INTERVAL: 426 MS
EKG QRS DURATION: 84 MS
EKG QTC CALCULATION (BAZETT): 475 MS
EKG R AXIS: 60 DEGREES
EKG T AXIS: 63 DEGREES
EKG VENTRICULAR RATE: 75 BPM
EPITHELIAL CELLS, UA: 5 /HPF (ref 0–5)
GFR AFRICAN AMERICAN: >60
GFR NON-AFRICAN AMERICAN: 53
GLOBULIN: 3.9 G/DL
GLUCOSE BLD-MCNC: 144 MG/DL (ref 70–99)
GLUCOSE URINE: NEGATIVE MG/DL
HCT VFR BLD CALC: 34.7 % (ref 36–48)
HEMOGLOBIN: 11.5 G/DL (ref 12–16)
HYALINE CASTS: 1 /LPF (ref 0–8)
KETONES, URINE: NEGATIVE MG/DL
LEUKOCYTE ESTERASE, URINE: NEGATIVE
MAGNESIUM: 2.3 MG/DL (ref 1.8–2.4)
MCH RBC QN AUTO: 30.3 PG (ref 26–34)
MCHC RBC AUTO-ENTMCNC: 33.1 G/DL (ref 31–36)
MCV RBC AUTO: 91.3 FL (ref 80–100)
MICROSCOPIC EXAMINATION: YES
NITRITE, URINE: NEGATIVE
PDW BLD-RTO: 14.8 % (ref 12.4–15.4)
PH UA: 5 (ref 5–8)
PLATELET # BLD: 351 K/UL (ref 135–450)
PMV BLD AUTO: 7.4 FL (ref 5–10.5)
POTASSIUM REFLEX MAGNESIUM: 3.3 MMOL/L (ref 3.5–5.1)
PROTEIN UA: ABNORMAL MG/DL
RBC # BLD: 3.79 M/UL (ref 4–5.2)
RBC UA: 1 /HPF (ref 0–4)
SODIUM BLD-SCNC: 141 MMOL/L (ref 136–145)
SPECIFIC GRAVITY UA: 1.03 (ref 1–1.03)
TOTAL PROTEIN: 7 G/DL (ref 6.4–8.2)
URINE REFLEX TO CULTURE: ABNORMAL
URINE TYPE: ABNORMAL
UROBILINOGEN, URINE: 0.2 E.U./DL
WBC # BLD: 6.9 K/UL (ref 4–11)
WBC UA: 8 /HPF (ref 0–5)

## 2021-04-03 PROCEDURE — 94640 AIRWAY INHALATION TREATMENT: CPT

## 2021-04-03 PROCEDURE — 6370000000 HC RX 637 (ALT 250 FOR IP): Performed by: NURSE PRACTITIONER

## 2021-04-03 PROCEDURE — 93010 ELECTROCARDIOGRAM REPORT: CPT | Performed by: INTERNAL MEDICINE

## 2021-04-03 PROCEDURE — 81001 URINALYSIS AUTO W/SCOPE: CPT

## 2021-04-03 PROCEDURE — 83735 ASSAY OF MAGNESIUM: CPT

## 2021-04-03 PROCEDURE — 85027 COMPLETE CBC AUTOMATED: CPT

## 2021-04-03 PROCEDURE — 80053 COMPREHEN METABOLIC PANEL: CPT

## 2021-04-03 PROCEDURE — 94760 N-INVAS EAR/PLS OXIMETRY 1: CPT

## 2021-04-03 PROCEDURE — 2580000003 HC RX 258: Performed by: NURSE PRACTITIONER

## 2021-04-03 PROCEDURE — 6360000002 HC RX W HCPCS: Performed by: NURSE PRACTITIONER

## 2021-04-03 PROCEDURE — 2700000000 HC OXYGEN THERAPY PER DAY

## 2021-04-03 PROCEDURE — 36415 COLL VENOUS BLD VENIPUNCTURE: CPT

## 2021-04-03 PROCEDURE — 97530 THERAPEUTIC ACTIVITIES: CPT

## 2021-04-03 PROCEDURE — 1200000000 HC SEMI PRIVATE

## 2021-04-03 PROCEDURE — 6370000000 HC RX 637 (ALT 250 FOR IP): Performed by: INTERNAL MEDICINE

## 2021-04-03 PROCEDURE — 97165 OT EVAL LOW COMPLEX 30 MIN: CPT

## 2021-04-03 PROCEDURE — 97116 GAIT TRAINING THERAPY: CPT

## 2021-04-03 PROCEDURE — 87449 NOS EACH ORGANISM AG IA: CPT

## 2021-04-03 PROCEDURE — 97162 PT EVAL MOD COMPLEX 30 MIN: CPT

## 2021-04-03 RX ORDER — IPRATROPIUM BROMIDE AND ALBUTEROL SULFATE 2.5; .5 MG/3ML; MG/3ML
1 SOLUTION RESPIRATORY (INHALATION)
Status: DISCONTINUED | OUTPATIENT
Start: 2021-04-03 | End: 2021-04-15 | Stop reason: HOSPADM

## 2021-04-03 RX ORDER — ALBUTEROL SULFATE 2.5 MG/3ML
2.5 SOLUTION RESPIRATORY (INHALATION)
Status: DISCONTINUED | OUTPATIENT
Start: 2021-04-03 | End: 2021-04-15 | Stop reason: HOSPADM

## 2021-04-03 RX ORDER — POLYETHYLENE GLYCOL 3350 17 G/17G
17 POWDER, FOR SOLUTION ORAL DAILY PRN
Status: DISCONTINUED | OUTPATIENT
Start: 2021-04-03 | End: 2021-04-15 | Stop reason: HOSPADM

## 2021-04-03 RX ORDER — PROMETHAZINE HYDROCHLORIDE 25 MG/1
12.5 TABLET ORAL EVERY 6 HOURS PRN
Status: DISCONTINUED | OUTPATIENT
Start: 2021-04-03 | End: 2021-04-15 | Stop reason: HOSPADM

## 2021-04-03 RX ORDER — DOXYCYCLINE HYCLATE 100 MG
100 TABLET ORAL EVERY 12 HOURS SCHEDULED
Status: DISCONTINUED | OUTPATIENT
Start: 2021-04-03 | End: 2021-04-09

## 2021-04-03 RX ORDER — METHYLPREDNISOLONE SODIUM SUCCINATE 40 MG/ML
40 INJECTION, POWDER, LYOPHILIZED, FOR SOLUTION INTRAMUSCULAR; INTRAVENOUS EVERY 6 HOURS
Status: COMPLETED | OUTPATIENT
Start: 2021-04-03 | End: 2021-04-05

## 2021-04-03 RX ORDER — ACETAMINOPHEN 325 MG/1
650 TABLET ORAL EVERY 6 HOURS PRN
Status: DISCONTINUED | OUTPATIENT
Start: 2021-04-03 | End: 2021-04-15 | Stop reason: HOSPADM

## 2021-04-03 RX ORDER — BUDESONIDE AND FORMOTEROL FUMARATE DIHYDRATE 160; 4.5 UG/1; UG/1
2 AEROSOL RESPIRATORY (INHALATION) 2 TIMES DAILY
Status: DISCONTINUED | OUTPATIENT
Start: 2021-04-03 | End: 2021-04-15 | Stop reason: HOSPADM

## 2021-04-03 RX ORDER — DILTIAZEM HYDROCHLORIDE 120 MG/1
120 CAPSULE, COATED, EXTENDED RELEASE ORAL 2 TIMES DAILY
Status: DISCONTINUED | OUTPATIENT
Start: 2021-04-03 | End: 2021-04-15 | Stop reason: HOSPADM

## 2021-04-03 RX ORDER — HYDROCHLOROTHIAZIDE 25 MG/1
25 TABLET ORAL DAILY
Status: DISCONTINUED | OUTPATIENT
Start: 2021-04-03 | End: 2021-04-04

## 2021-04-03 RX ORDER — SODIUM CHLORIDE 9 MG/ML
25 INJECTION, SOLUTION INTRAVENOUS PRN
Status: DISCONTINUED | OUTPATIENT
Start: 2021-04-03 | End: 2021-04-15 | Stop reason: HOSPADM

## 2021-04-03 RX ORDER — PREDNISONE 20 MG/1
40 TABLET ORAL DAILY
Status: DISCONTINUED | OUTPATIENT
Start: 2021-04-05 | End: 2021-04-06

## 2021-04-03 RX ORDER — SODIUM CHLORIDE 0.9 % (FLUSH) 0.9 %
10 SYRINGE (ML) INJECTION PRN
Status: DISCONTINUED | OUTPATIENT
Start: 2021-04-03 | End: 2021-04-15 | Stop reason: HOSPADM

## 2021-04-03 RX ORDER — DOCUSATE SODIUM 100 MG/1
200 CAPSULE, LIQUID FILLED ORAL 2 TIMES DAILY PRN
Status: DISCONTINUED | OUTPATIENT
Start: 2021-04-03 | End: 2021-04-15 | Stop reason: HOSPADM

## 2021-04-03 RX ORDER — ACETAMINOPHEN 650 MG/1
650 SUPPOSITORY RECTAL EVERY 6 HOURS PRN
Status: DISCONTINUED | OUTPATIENT
Start: 2021-04-03 | End: 2021-04-15 | Stop reason: HOSPADM

## 2021-04-03 RX ORDER — BUPROPION HYDROCHLORIDE 150 MG/1
150 TABLET, EXTENDED RELEASE ORAL DAILY
Status: DISCONTINUED | OUTPATIENT
Start: 2021-04-03 | End: 2021-04-15 | Stop reason: HOSPADM

## 2021-04-03 RX ORDER — METOPROLOL SUCCINATE 50 MG/1
100 TABLET, EXTENDED RELEASE ORAL DAILY
Status: DISCONTINUED | OUTPATIENT
Start: 2021-04-03 | End: 2021-04-09

## 2021-04-03 RX ORDER — ONDANSETRON 2 MG/ML
4 INJECTION INTRAMUSCULAR; INTRAVENOUS EVERY 6 HOURS PRN
Status: DISCONTINUED | OUTPATIENT
Start: 2021-04-03 | End: 2021-04-15 | Stop reason: HOSPADM

## 2021-04-03 RX ORDER — SODIUM CHLORIDE 0.9 % (FLUSH) 0.9 %
10 SYRINGE (ML) INJECTION EVERY 12 HOURS SCHEDULED
Status: DISCONTINUED | OUTPATIENT
Start: 2021-04-03 | End: 2021-04-15 | Stop reason: HOSPADM

## 2021-04-03 RX ORDER — ASPIRIN 81 MG/1
81 TABLET ORAL DAILY
Status: DISCONTINUED | OUTPATIENT
Start: 2021-04-03 | End: 2021-04-13

## 2021-04-03 RX ORDER — FUROSEMIDE 20 MG/1
20 TABLET ORAL DAILY
Status: DISCONTINUED | OUTPATIENT
Start: 2021-04-03 | End: 2021-04-04

## 2021-04-03 RX ORDER — CELECOXIB 200 MG/1
200 CAPSULE ORAL DAILY
Status: DISCONTINUED | OUTPATIENT
Start: 2021-04-03 | End: 2021-04-04

## 2021-04-03 RX ADMIN — ACETAMINOPHEN 650 MG: 325 TABLET ORAL at 10:10

## 2021-04-03 RX ADMIN — ASPIRIN 81 MG: 81 TABLET, COATED ORAL at 10:05

## 2021-04-03 RX ADMIN — SODIUM CHLORIDE, PRESERVATIVE FREE 10 ML: 5 INJECTION INTRAVENOUS at 10:06

## 2021-04-03 RX ADMIN — DOXYCYCLINE HYCLATE 100 MG: 100 TABLET, FILM COATED ORAL at 10:06

## 2021-04-03 RX ADMIN — IPRATROPIUM BROMIDE AND ALBUTEROL SULFATE 1 AMPULE: .5; 3 SOLUTION RESPIRATORY (INHALATION) at 07:50

## 2021-04-03 RX ADMIN — IPRATROPIUM BROMIDE AND ALBUTEROL SULFATE 1 AMPULE: .5; 3 SOLUTION RESPIRATORY (INHALATION) at 15:56

## 2021-04-03 RX ADMIN — FUROSEMIDE 20 MG: 20 TABLET ORAL at 10:06

## 2021-04-03 RX ADMIN — CELECOXIB 200 MG: 200 CAPSULE ORAL at 10:05

## 2021-04-03 RX ADMIN — DILTIAZEM HYDROCHLORIDE 120 MG: 120 CAPSULE, COATED, EXTENDED RELEASE ORAL at 01:42

## 2021-04-03 RX ADMIN — HYDROCHLOROTHIAZIDE 25 MG: 25 TABLET ORAL at 10:05

## 2021-04-03 RX ADMIN — ACETAMINOPHEN 650 MG: 325 TABLET ORAL at 17:51

## 2021-04-03 RX ADMIN — BUPROPION HYDROCHLORIDE 150 MG: 150 TABLET, EXTENDED RELEASE ORAL at 10:05

## 2021-04-03 RX ADMIN — IPRATROPIUM BROMIDE AND ALBUTEROL SULFATE 1 AMPULE: .5; 3 SOLUTION RESPIRATORY (INHALATION) at 11:45

## 2021-04-03 RX ADMIN — BUDESONIDE AND FORMOTEROL FUMARATE DIHYDRATE 2 PUFF: 160; 4.5 AEROSOL RESPIRATORY (INHALATION) at 19:31

## 2021-04-03 RX ADMIN — DILTIAZEM HYDROCHLORIDE 120 MG: 120 CAPSULE, COATED, EXTENDED RELEASE ORAL at 10:05

## 2021-04-03 RX ADMIN — BUDESONIDE AND FORMOTEROL FUMARATE DIHYDRATE 2 PUFF: 160; 4.5 AEROSOL RESPIRATORY (INHALATION) at 07:55

## 2021-04-03 RX ADMIN — DILTIAZEM HYDROCHLORIDE 120 MG: 120 CAPSULE, COATED, EXTENDED RELEASE ORAL at 21:41

## 2021-04-03 RX ADMIN — DOXYCYCLINE HYCLATE 100 MG: 100 TABLET, FILM COATED ORAL at 21:41

## 2021-04-03 RX ADMIN — METOPROLOL SUCCINATE 100 MG: 50 TABLET, EXTENDED RELEASE ORAL at 10:05

## 2021-04-03 RX ADMIN — IPRATROPIUM BROMIDE AND ALBUTEROL SULFATE 1 AMPULE: .5; 3 SOLUTION RESPIRATORY (INHALATION) at 19:31

## 2021-04-03 RX ADMIN — POTASSIUM BICARBONATE 40 MEQ: 782 TABLET, EFFERVESCENT ORAL at 06:27

## 2021-04-03 RX ADMIN — ENOXAPARIN SODIUM 40 MG: 40 INJECTION SUBCUTANEOUS at 10:05

## 2021-04-03 RX ADMIN — METHYLPREDNISOLONE SODIUM SUCCINATE 40 MG: 40 INJECTION, POWDER, FOR SOLUTION INTRAMUSCULAR; INTRAVENOUS at 05:35

## 2021-04-03 RX ADMIN — METHYLPREDNISOLONE SODIUM SUCCINATE 40 MG: 40 INJECTION, POWDER, FOR SOLUTION INTRAMUSCULAR; INTRAVENOUS at 14:48

## 2021-04-03 RX ADMIN — SODIUM CHLORIDE, PRESERVATIVE FREE 10 ML: 5 INJECTION INTRAVENOUS at 21:43

## 2021-04-03 RX ADMIN — METHYLPREDNISOLONE SODIUM SUCCINATE 40 MG: 40 INJECTION, POWDER, FOR SOLUTION INTRAMUSCULAR; INTRAVENOUS at 21:41

## 2021-04-03 ASSESSMENT — PAIN DESCRIPTION - LOCATION
LOCATION: BACK
LOCATION: BACK

## 2021-04-03 ASSESSMENT — PAIN DESCRIPTION - FREQUENCY: FREQUENCY: CONTINUOUS

## 2021-04-03 ASSESSMENT — PAIN DESCRIPTION - PAIN TYPE
TYPE: CHRONIC PAIN
TYPE: CHRONIC PAIN

## 2021-04-03 ASSESSMENT — PAIN SCALES - GENERAL
PAINLEVEL_OUTOF10: 0
PAINLEVEL_OUTOF10: 6
PAINLEVEL_OUTOF10: 3

## 2021-04-03 ASSESSMENT — PAIN DESCRIPTION - DESCRIPTORS: DESCRIPTORS: ACHING

## 2021-04-03 NOTE — PROGRESS NOTES
Pt arrived to room 4114 via stretcher. Ambulated pt to bathroom x1 w/ a cane. Pt tolerated well.     Electronically signed by Polly Ace RN on 4/3/2021 at 1:17 AM

## 2021-04-03 NOTE — PROGRESS NOTES
Comprehensive Nutrition Assessment    Type and Reason for Visit:  Initial, Positive Nutrition Screen    Nutrition Recommendations/Plan:   General diet   Ensure BID  Will monitor nutritional adequacy, nutrition-related labs, weights, BMs, and clinical progress     Nutrition Assessment:  + Screen for MST 2. Pt admitted with COPD. Pt with therapy during visit today. Per H&P, family reported pt has not eating well and losing weight. Hard to assess true wt loss as initial weight and current weight are variable. Appears UBW is 135 lb. Currently on General diet with Ensure BID. Is eating most of meals so far this admission. Malnutrition Assessment:  Malnutrition Status:  Insufficient data     Estimated Daily Nutrient Needs:  Energy (kcal):  0510-8240 kcal (30-35 kcal/kg ABW); Weight Used for Energy Requirements:  Current     Protein (g):  58-67 gm (1.2-1.4 gm/kg ABW);  Weight Used for Protein Requirements:  Current        Fluid (ml/day):   ; Method Used for Fluid Requirements:  1 ml/kcal      Nutrition Related Findings:  No recent BM documentated; reviewed labs      Wounds:  None       Current Nutrition Therapies:    DIET GENERAL;  Dietary Nutrition Supplements: Standard Oral Supplement    Anthropometric Measures:  · Height: 5' 7\" (170.2 cm)  · Current Body Weight: 105 lb (47.6 kg)   · Admission Body Weight: 116 lb (52.6 kg)    · Usual Body Weight: 135 lb (61.2 kg)     · Ideal Body Weight: 135 lbs; % Ideal Body Weight 77.8 %   · BMI: 16.4  · Adjusted Body Weight:  ; No Adjustment   · BMI Categories: Underweight (BMI less than 22) age over 72       Nutrition Diagnosis:   · Inadequate oral intake related to (poor appetite) as evidenced by poor intake prior to admission, weight loss      Nutrition Interventions:   Food and/or Nutrient Delivery:  Continue Current Diet, Continue Oral Nutrition Supplement  Nutrition Education/Counseling:  No recommendation at this time   Coordination of Nutrition Care:  Continue to monitor while inpatient    Goals:   Tolerate diet and consume greater than 50% of meals and supplements this admission       Nutrition Monitoring and Evaluation:   Behavioral-Environmental Outcomes:  None Identified   Food/Nutrient Intake Outcomes:  Food and Nutrient Intake, Supplement Intake  Physical Signs/Symptoms Outcomes:  Biochemical Data, Nutrition Focused Physical Findings, Skin, Weight     Discharge Planning:    Continue Oral Nutrition Supplement     Electronically signed by Makenna Saenz RD, LD on 4/3/21 at 12:18 PM EDT    Contact: [phone]

## 2021-04-03 NOTE — ED PROVIDER NOTES
[facility]        Pt Name: Coleman Arriola  MRN: [id number]  [place] 1940  Date of evaluation: 4/2/2021  Provider: ALYSIA Merida - CNP  PCP: Mamta Coe MD     I have seen and evaluated this patient. CHIEF COMPLAINT       Chief Complaint   Patient presents with    Shortness of Breath       HISTORY OF PRESENT ILLNESS   (Location, Timing/Onset, Context/Setting, Quality, Duration, Modifying Factors, Severity, Associated Signs and Symptoms)  Note limiting factors. Colmean Arriola is a 78 y.o. female with medical history of asthma, hypertension, tobacco abuse, COPD, renal insufficiency, cataracts, hypercholesterolemia, stress incontinence, macular degeneration, psoriasis, OA, IBS, pulmonary nodule right, frequent PVCs, adjustment disorder with anxious mood, sciatica, osteoporosis, colon polyp, venous insufficiency of bilateral lower extremities, uterovaginal prolapse, inguinal hernia, chronic constipation, compression fracture of T7, senile osteoporosis, shingles, cholecystectomy who presents the ED with complaints of aggressive weakness and shortness of air that is been ongoing for the past few days. Patient does live in an apartment home alone and states usually the family has been coming over to help take care of her, bathe her, cook for work, and drop-off groceries. However the daughter at bedside said they have recently been released from a Covid quarantine and the patient has had limited assistance from the family members. States that she does live home alone does not wear oxygen. EMS had been called numerous times over the past few days for patient's weakness and shortness of breath. When they arrived today her oxygen level was 88% on room air. Patient does note she has had decreased p.o. intake due to the inability to cook and prepare her food for. States that she has mostly been living off prepackaged snacks and cookies. CONTINUE these medications which have NOT CHANGED    Details   metoprolol succinate (TOPROL XL) 100 MG extended release tablet TAKE ONE TABLET BY MOUTH DAILY **CALL TO MAKE APPOINTMENT WITH DOCTOR **  Qty: 90 tablet, Refills: 0    Associated Diagnoses: Essential hypertension      buPROPion (WELLBUTRIN SR) 150 MG extended release tablet TAKE ONE TABLET BY MOUTH DAILY  Qty: 90 tablet, Refills: 0    Associated Diagnoses: Adjustment disorder with anxious mood      fluticasone-salmeterol (ADVAIR) 250-50 MCG/DOSE AEPB INHALE ONE PUFF BY MOUTH TWICE A DAY  Qty: 60 each, Refills: 3    Associated Diagnoses: Pulmonary emphysema, unspecified emphysema type (HCC)      albuterol sulfate  (90 Base) MCG/ACT inhaler INHALE 2 PUFF(S) BY MOUTH EVERY 4 HOURS AS NEEDED FOR WHEEZING  Qty: 8.5 g, Refills: 0    Associated Diagnoses: Pulmonary emphysema, unspecified emphysema type ([address] Moderate persistent asthma without complication      tiZANidine (ZANAFLEX) 4 MG tablet TAKE ONE TABLET BY MOUTH THREE TIMES A DAY AS NEEDED  Qty: 90 tablet, Refills: 0      furosemide (LASIX) 20 MG tablet TAKE ONE TABLET BY MOUTH DAILY  Qty: 90 tablet, Refills: 0    Associated Diagnoses: Venous insufficiency of both lower extremities      hydroCHLOROthiazide (HYDRODIURIL) 25 MG tablet TAKE ONE TABLET BY MOUTH EVERY MORNING  Qty: 90 tablet, Refills: 0    Associated Diagnoses: Essential hypertension      dilTIAZem (TIAZAC) 120 MG extended release capsule TAKE ONE CAPSULE BY MOUTH once daily in the evening  Qty: 90 capsule, Refills: 0    Associated Diagnoses: Essential hypertension      theophylline (UNIPHYL) 400 MG extended release tablet TAKE 1/2 TABLET BY MOUTH TWICE A DAY  Qty: 90 tablet, Refills: 0    Associated Diagnoses: Pulmonary emphysema, unspecified emphysema type ([address]      ! ! Multiple Vitamins-Minerals (PRESERVISION AREDS PO) Take by mouth daily      !!  Multiple Vitamins-Minerals (MULTIVITAMIN ADULTS PO) Take by mouth      vitamin C use: No       SCREENINGS    Virgen Coma Scale  Eye Opening: Spontaneous  Best Verbal Response: Oriented  Best Motor Response: Obeys commands  Virgen Coma Scale Score: 15        PHYSICAL EXAM    (up to 7 for level 4, 8 or more for level 5)     ED Triage Vitals   BP Temp Temp Source Pulse Resp SpO2 Height Weight   04/02/21 2054 04/02/21 2054 04/02/21 2054 04/02/21 2054 04/02/21 2054 04/02/21 2114 -- 04/02/21 2054   139/87 98.1 °F (36.7 °C) Oral 78 24 (!) 88 %  116 lb 13.5 oz (53 kg)       Physical Exam  Vitals signs and nursing note reviewed. Constitutional:       General: She is awake. Appearance: Normal appearance. She is well-developed and underweight. She is ill-appearing. Interventions: Nasal cannula in place. HENT:      Head: Normocephalic and atraumatic. Nose: Nose normal.   Eyes:      General:         Right eye: No discharge. Left eye: No discharge. Neck:      Musculoskeletal: Normal range of motion. Cardiovascular:      Rate and Rhythm: Normal rate and regular rhythm. Heart sounds: Normal heart sounds. Pulmonary:      Effort: Pulmonary effort is normal. No respiratory distress. Breath sounds: Wheezing present. Abdominal:      General: Bowel sounds are normal.      Palpations: Abdomen is soft. Tenderness: There is no abdominal tenderness. Musculoskeletal: Normal range of motion. Right lower leg: No edema. Left lower leg: No edema. Comments: kyphosis   Skin:     General: Skin is warm and dry. Coloration: Skin is not pale. Neurological:      Mental Status: She is alert and oriented to person, place, and time. Psychiatric:         Behavior: Behavior normal. Behavior is cooperative.          DIAGNOSTIC RESULTS   LABS:    Labs Reviewed   CBC WITH AUTO DIFFERENTIAL - Abnormal; Notable for the following components:       Result Value    Lymphocytes Absolute 0.3 (*)     All other components within normal limits    Narrative:     Performed at:  [facility]  [address] [place], [place] [address]   Phone [phone]   BRAIN NATRIURETIC PEPTIDE - Abnormal; Notable for the following components:    Pro-BNP 1,024 (*)     All other components within normal limits    Narrative:     Performed at:  [facility]  [address] [facility] [address]   Phone [phone], RAPID    Narrative:     Performed at:  [facility]  [address] [place], [place] [address]   Phone [phone]   TROPONIN    Narrative:     Performed at:  [address] [address]   Phone [phone]   LACTIC ACID, PLASMA    Narrative:     Performed at:  [facility]  [address] [place], [place] [address]   Phone [phone]   PROCALCITONIN    Narrative:     Performed at:  [place] [address] [place], [place] [address]   Phone [phone]   COMPREHENSIVE METABOLIC PANEL W/ REFLEX TO MG FOR LOW K   URINE RT REFLEX TO CULTURE   THEOPHYLLINE LEVEL       All other labs were within normal range or not returned as of this dictation. EKG: All EKG's are interpreted by the Emergency Department Physician in the absence of a cardiologist.  Please see their note for interpretation of EKG. RADIOLOGY:   Non-plain film images such as CT, Ultrasound and MRI are read by the radiologist. Plain radiographic images are visualized and preliminarily interpreted by the ED Provider with the below findings:        Interpretation per the Radiologist below, if available at the time of this note:    XR CHEST PORTABLE   Final Result   No acute disease. No results found.         PROCEDURES   Unless otherwise noted below, none     Procedures    CRITICAL CARE TIME   N/A    CONSULTS:  IP CONSULT TO HOSPITALIST  IP CONSULT TO CASE MANAGEMENT      EMERGENCY DEPARTMENT COURSE and DIFFERENTIAL DIAGNOSIS/MDM:   Vitals:    Vitals:    04/02/21 2239 04/03/21 0023 04/03/21 0115 04/03/21 0125   BP:  128/80  (!) 155/78   Pulse:  73  86   Resp: 28 29  18   Temp:    97.4 °F (36.3 °C)   TempSrc:    Oral   SpO2: 100% 100%  99%   Weight:    105 lb 6.1 oz (47.8 kg)   Height:   5' 7\" (1.702 m)        Patient was given the following medications:  methylPREDNISolone sodium (SOLU-MEDROL) injection 125 mg (125 mg Intravenous Given 4/2/21 2250)   ipratropium-albuterol (DUONEB) nebulizer solution 1 ampule (1 ampule Inhalation Given 4/2/21 2239)           Pertinent Labs & Imaging studies reviewed. (See chart for details)   -  Patient seen and evaluated in the emergency department. -  Triage and nursing notes reviewed and incorporated. -  Old chart records reviewed and incorporated. -  Patient case was not discussed with attending physician,  Dr. Beatris Anderson was available for consultation  -  Differential diagnosis includes:  COPD exacerbation, PE, pneumonia, pleural effusion, pericarditis, cor pulmonale, cardiac tamponade, CHF exacerbation, bronchitis, versus COVID-19  -  Work-up included:  See above CXR, CBC, CMP, troponin, BNP, UA, lactic acid, pro calcitonin, theophylline level, rapid Covid, EKG  -  ED treatment included:   Prednisone, DuoNeb  - Consults: Hospitalist Dr. Angel Rowland  -  Results discussed with patient. Labs show  CBC with absolute lymphs 0.3. Troponin negative. BNP 1024. Lactic acid 1.9. Procalcitonin 0.06. Rapid Covid is negative. CXR shows no acute disease. CMP with chloride 92, CO2 39, glucose 104, BUN 32, alk phos 157, ALT less than 5, AST 11. Patient states that she is feeling better after the medication and oxygen. She is agreeable with the plan to stay for admission and PT OT eval.  Informed her she possibly would need to be discharged out to placed in a rehab center and she is agreeable with this plan.   States currently she does not want to remain at the nursing home and would prefer to return home with independent living. The patient is agreeable with plan of care and disposition.  -  Disposition:    Admission    CRITICAL CARE TIME   Total Critical Care time was 30 minutes, excluding separately reportable procedures. There was a high probability of clinically significant/life threatening deterioration in the patient's condition which required my urgent intervention. FINAL IMPRESSION      1. COPD exacerbation ([address]    2. Hypoxia    3. Shortness of breath    4.  Fatigue, unspecified type          DISPOSITION/PLAN   DISPOSITION    Admission         (Please note that portions of this note were completed with a voice recognition program.  Efforts were made to edit the dictations but occasionally words are mis-transcribed.)    ALYSIA Delatorre CNP (electronically signed)            ALYSIA Delatorre CNP  04/03/21 4358

## 2021-04-03 NOTE — PROGRESS NOTES
4 Eyes Skin Assessment     NAME:  Rufina Meng  YOB: 1940  MEDICAL RECORD NUMBER:  [id number]    The patient is being assess for  Admission    I agree that 2 RN's have performed a thorough Head to Toe Skin Assessment on the patient. ALL assessment sites listed below have been assessed. Areas assessed by both nurses:    Head, Face, Ears, Shoulders, Back, Chest, Arms, Elbows, Hands, Sacrum. Buttock, Coccyx, Ischium and Legs. Feet and Heels        Does the Patient have a Wound?  No noted wound(s)       [place] Prevention initiated:  NA   Wound Care Orders initiated:  NA    Pressure Injury (Stage 3,4, Unstageable, DTI, NWPT, and Complex wounds) if present place consult order under [de-identified] NA    New and Established Ostomies if present place consult order under : NA      Nurse 1 eSignature: Electronically signed by Marlon Reynoso RN on 4/3/21 at 2:27 AM EDT    **SHARE this note so that the co-signing nurse is able to place an eSignature**    Nurse 2 eSignature: Electronically signed by James Butt RN on 4/3/21 at 2:29 AM EDT

## 2021-04-03 NOTE — PROGRESS NOTES
Hospitalist Progress Note      PCP: Dimitris Mariee MD    Date of Admission: 4/2/2021        Subjective:   Still complains of shortness of breath especially after ambulation this morning. . No fevers or chills. No chest pain      Medications:  Reviewed    Infusion Medications    sodium chloride       Scheduled Medications    sodium chloride flush  10 mL Intravenous 2 times per day    enoxaparin  40 mg Subcutaneous Daily    ipratropium-albuterol  1 ampule Inhalation Q4H WA    methylPREDNISolone  40 mg Intravenous Q6H    Followed by   Brittany Owens ON 4/5/2021] predniSONE  40 mg Oral Daily    aspirin  81 mg Oral Daily    buPROPion  150 mg Oral Daily    dilTIAZem  120 mg Oral BID    budesonide-formoterol  2 puff Inhalation BID    furosemide  20 mg Oral Daily    hydroCHLOROthiazide  25 mg Oral Daily    metoprolol succinate  100 mg Oral Daily    celecoxib  200 mg Oral Daily    doxycycline hyclate  100 mg Oral 2 times per day     PRN Meds: sodium chloride flush, sodium chloride, promethazine **OR** ondansetron, polyethylene glycol, acetaminophen **OR** acetaminophen, albuterol, docusate sodium      Intake/Output Summary (Last 24 hours) at 4/3/2021 1031  Last data filed at 4/3/2021 1015  Gross per 24 hour   Intake 490 ml   Output --   Net 490 ml       Exam:    /65   Pulse 83   Temp 97.7 °F (36.5 °C) (Oral)   Resp 18   Ht 5' 7\" (1.702 m)   Wt 105 lb 6.1 oz (47.8 kg)   SpO2 94%   BMI 16.50 kg/m²     General appearance: No apparent distress, appears stated age and cooperative. HEENT: Pupils equal, round, and reactive to light. Conjunctivae/corneas clear. Neck: Supple, with full range of motion. No jugular venous distention. Trachea midline. Respiratory:  Normal respiratory effort. Clear to auscultation, bilaterally without Rales/Wheezes/Rhonchi. Cardiovascular: Regular rate and rhythm with normal S1/S2 without murmurs, rubs or gallops.   Abdomen: Soft, non-tender, non-distended with normal bowel sounds. Musculoskeletal: No clubbing, cyanosis or edema bilaterally. Full range of motion without deformity. Skin: Skin color, texture, turgor normal.  No rashes or lesions. Neurologic:  Neurovascularly intact without any focal sensory/motor deficits. Cranial nerves: II-XII intact, grossly non-focal.  Psychiatric: Alert and oriented, thought content appropriate, normal insight  Capillary Refill: Brisk,< 3 seconds   Peripheral Pulses: +2 palpable, equal bilaterally       Labs:   Recent Labs     04/02/21 2127 04/03/21 0439   WBC 8.0 6.9   HGB 12.5 11.5*   HCT 38.1 34.7*    351     Recent Labs     04/02/21 2127 04/03/21 0439    141   K 3.7 3.3*   CL 92* 94*   CO2 39* 41*   BUN 32* 36*   CREATININE 1.0 1.0   CALCIUM 10.1 9.5     Recent Labs     04/02/21 2127 04/03/21 0439   AST 11* 10*   ALT <5* <5*   BILITOT 0.3 0.3   ALKPHOS 157* 142*     No results for input(s): INR in the last 72 hours. Recent Labs     04/02/21 2127   TROPONINI <0.01       Assessment/Plan:    -COPD exacerbation. . Continue bronchodilators, Solu-Medrol and doxycycline. . Reviewed chest x-ray which is clear    -Acute respiratory failure with hypoxia due to COPD exacerbation. Requiring up to 3 L nasal cannula, wean down to 1 L nasal cannula.  continue to wean oxygen as tolerated    -Generalized weakness due to COPD exacerbation--PT and OT was consulted    -Essential hypertension stable-continue diltiazem, metoprolol, Lasix,    -Hyperlipidemia-not on statin therapy    -Hypokalemia-replete potassium    DVT Prophylaxis: Lovenox  Diet: DIET GENERAL;  Dietary Nutrition Supplements: Standard Oral Supplement  Code Status: Full Code        Melda Sandifer, MD

## 2021-04-03 NOTE — PROGRESS NOTES
Pt alert to person, place, time, and situation. Pt reported chronic right sided back pain, rates pain 8/10-pt medicated with prn tylenol. Pt reports SOB with exertion, pt SpO2 94% on 2lnc. Pt resting in bed. Denies other needs. Bed alarm on. Call light and item need in reach. Electronically signed by Kristin Maria RN on 4/3/2021 at 7:17 PM

## 2021-04-03 NOTE — PLAN OF CARE
Problem: Pain:  Goal: Pain level will decrease  Description: Pain level will decrease  Outcome: Ongoing  Note: Pt able to express presence and absence of pain using numerical pain scale. Pt pain is managed by PRN analgesics as ordered by MD. Pain reassess after each interventions. Will continue to monitor as needed. Goal: Control of acute pain  Description: Control of acute pain  Outcome: Ongoing  Goal: Control of chronic pain  Description: Control of chronic pain  Outcome: Ongoing     Problem: Falls - Risk of:  Goal: Will remain free from falls  Description: Will remain free from falls  Outcome: Ongoing  Note: Pt free from falls this shift. Non skid socks provided. Bed and chair alarm used. Call light always within reach. Pt able and agreeable to contact for safety appropriately. Goal: Absence of physical injury  Description: Absence of physical injury  Outcome: Ongoing     Problem: Skin Integrity:  Goal: Will show no infection signs and symptoms  Description: Will show no infection signs and symptoms  Outcome: Ongoing  Note: Skin assessment performed each shift per protocol. Pt encouraged to reposition often. Will continue to monitor and assess for skin breakdown.         Problem: Infection:  Goal: Will remain free from infection  Description: Will remain free from infection  Outcome: Ongoing     Problem: Safety:  Goal: Free from accidental physical injury  Description: Free from accidental physical injury  Outcome: Ongoing  Goal: Free from intentional harm  Description: Free from intentional harm  Outcome: Ongoing     Problem: Daily Care:  Goal: Daily care needs are met  Description: Daily care needs are met  Outcome: Ongoing     Problem: Discharge Planning:  Goal: Patients continuum of care needs are met  Description: Patients continuum of care needs are met  Outcome: Ongoing     Problem: Nutrition  Goal: Optimal nutrition therapy  4/3/2021 1917 by Compa Barriga RN  Outcome: Ongoing  4/3/2021 1219

## 2021-04-03 NOTE — PLAN OF CARE
Problem: Pain:  Goal: Pain level will decrease  Description: Pain level will decrease  Outcome: Ongoing  Goal: Control of acute pain  Description: Control of acute pain  Outcome: Ongoing  Goal: Control of chronic pain  Description: Control of chronic pain  Outcome: Ongoing     Problem: Falls - Risk of:  Goal: Will remain free from falls  Description: Will remain free from falls  Outcome: Ongoing  Goal: Absence of physical injury  Description: Absence of physical injury  Outcome: Ongoing     Problem: Skin Integrity:  Goal: Will show no infection signs and symptoms  Description: Will show no infection signs and symptoms  Outcome: Ongoing  Goal: Absence of new skin breakdown  Description: Absence of new skin breakdown  Outcome: Ongoing     Problem: Breathing Pattern - Ineffective:  Goal: Ability to achieve and maintain a regular respiratory rate will improve  Description: Ability to achieve and maintain a regular respiratory rate will improve  Outcome: Ongoing     Problem: Infection:  Goal: Will remain free from infection  Description: Will remain free from infection  Outcome: Ongoing     Problem: Safety:  Goal: Free from accidental physical injury  Description: Free from accidental physical injury  Outcome: Ongoing  Goal: Free from intentional harm  Description: Free from intentional harm  Outcome: Ongoing     Problem: Daily Care:  Goal: Daily care needs are met  Description: Daily care needs are met  Outcome: Ongoing     Problem: Discharge Planning:  Goal: Patients continuum of care needs are met  Description: Patients continuum of care needs are met  Outcome: Ongoing

## 2021-04-03 NOTE — PROGRESS NOTES
Occupational Therapy   Occupational Therapy Initial Assessment and Tentative D/C    Date: 4/3/2021   Patient Name: Salome White  MRN: [id number]     : 1940    Date of Service: 4/3/2021    Discharge Recommendations: Salome White scored a 17/24 on the AM-PAC ADL Inpatient form. Current research shows that an AM-PAC score of 17 or less is typically not associated with a discharge to the patient's home setting. Based on the patient's AM-PAC score and their current ADL deficits, it is recommended that the patient have 3-5 sessions per week of Occupational Therapy at d/c to increase the patient's independence. Please see assessment section for further patient specific details. If patient discharges prior to next session this note will serve as a discharge summary. Please see below for the latest assessment towards goals. Continue to assess pending progress, 3-5 sessions per week, 24 hour supervision or assist, 2-3 sessions per week(SNF vs home with 24hr and HHOT)  OT Equipment Recommendations  Other: continue to assess    Assessment   Performance deficits / Impairments: Decreased functional mobility ; Decreased strength;Decreased ADL status; Decreased endurance;Decreased balance;Decreased high-level IADLs;Decreased safe awareness  Assessment: Bisi Dai is a 78 y.o. female with medical history of asthma, hypertension, tobacco abuse, COPD, renal insufficiency, cataracts, hypercholesterolemia, stress incontinence, macular degeneration, psoriasis, OA, IBS, pulmonary nodule right, frequent PVCs, adjustment disorder with anxious mood, sciatica, osteoporosis, colon polyp, venous insufficiency of bilateral lower extremities, uterovaginal prolapse, inguinal hernia, chronic constipation, compression fracture of T7, senile osteoporosis, shingles, cholecystectomy who presents the ED with complaints of aggressive weakness and shortness of air that is been ongoing for the past few days.   Patient does live in an apartment home alone and states usually the family has been coming over to help take care of her, bathe her, cook for work, and drop-off groceries. However the daughter at bedside said they have recently been released from a Covid quarantine and the patient has had limited assistance from the family members. States that she does live home alone does not wear oxygen. EMS had been called numerous times over the past few days for patient's weakness and shortness of breath. When they arrived today her oxygen level was 88% on room air. Patient does note she has had decreased p.o. intake due to the inability to cook and prepare her food for. States that she has mostly been living off prepackaged snacks and cookies. PTA pt from home alone where pt was Ind with mobility and ADLs with use of cane. Pt currently functioning bleow baseline. Pt with decreased overall endurance and balance with standing activities. Pt will benefit from skilled OT services at this time. Anticipate pt will benefit from continued OT at time of D/C. Prognosis: Good  Decision Making: Low Complexity  Exam: see above  Assistance / Modification: RW  OT Education: OT Role;Plan of Care;Transfer Training  REQUIRES OT FOLLOW UP: Yes  Activity Tolerance  Activity Tolerance: Patient Tolerated treatment well  Safety Devices  Safety Devices in place: Yes  Type of devices: Chair alarm in place;Call light within reach;Gait belt;Nurse notified; Left in chair           Patient Diagnosis(es): The primary encounter diagnosis was COPD exacerbation ([facility] [address] Diagnoses of Hypoxia, Shortness of breath, and Fatigue, unspecified type were also pertinent to this visit. has a past medical history of HTN (hypertension), Hypercholesterolemia, MAC (mycobacterium avium-intracellulare complex), Osteoarthritis, Osteopetrosis, Papanicolaou smear of cervix with atypical squamous cells of undetermined significance (ASC-US), Shingles, and Stress incontinence.    has a past surgical history that includes Cholecystectomy; Breast surgery; Cataract removal (Left, 2009); Inguinal hernia repair (Right, 2015); Spine surgery (N/A, 11/25/2020); and CT BIOPSY BONE MARROW (2/12/2021). Restrictions  Restrictions/Precautions  Restrictions/Precautions: Fall Risk  Position Activity Restriction  Other position/activity restrictions: 2L O2    Subjective   General  Chart Reviewed: Yes  Patient assessed for rehabilitation services?: Yes  Additional Pertinent Hx: per ED note, Rome Schmidt is a 78 y.o. female with medical history of asthma, hypertension, tobacco abuse, COPD, renal insufficiency, cataracts, hypercholesterolemia, stress incontinence, macular degeneration, psoriasis, OA, IBS, pulmonary nodule right, frequent PVCs, adjustment disorder with anxious mood, sciatica, osteoporosis, colon polyp, venous insufficiency of bilateral lower extremities, uterovaginal prolapse, inguinal hernia, chronic constipation, compression fracture of T7, senile osteoporosis, shingles, cholecystectomy who presents the ED with complaints of aggressive weakness and shortness of air that is been ongoing for the past few days. Patient does live in an apartment home alone and states usually the family has been coming over to help take care of her, bathe her, cook for work, and drop-off groceries. However the daughter at bedside said they have recently been released from a Covid quarantine and the patient has had limited assistance from the family members. States that she does live home alone does not wear oxygen. EMS had been called numerous times over the past few days for patient's weakness and shortness of breath. When they arrived today her oxygen level was 88% on room air. Patient does note she has had decreased p.o. intake due to the inability to cook and prepare her food for. States that she has mostly been living off prepackaged snacks and cookies.   Family is concerned as she has noticed a decreased activity and weight loss. Patient states that she is unable to effectively care for herself at home anymore. She denies any recent trauma, accidents, head injuries, or falls. She denies being anticoagulated. She does note to smoking approximately half pack per day since 2015. Patient does note that she has been tapering down and believes she last smoked approximately 1 month ago. Patient's family said this is due to because no one was bringing her cigarettes. Is unsure if patient has been compliant with her medication due to her increased fatigue and weakness. Said she gets very fatigued just even getting up off of the couch to walk over to the bathroom. Family states that she has been sleeping hunched over in the couch throughout the day. Family / Caregiver Present: No  Referring Practitioner: ALYSIA Cummings CNP  Subjective  Subjective: Pt agreeable to OT evaluation. Pt reports chronic back pain with no number stated. General Comment  Comments: okay for therapy per RN.   Patient Currently in Pain: Yes  Pain Assessment  Pain Level: 8  Vital Signs  Temp: 97.7 °F (36.5 °C)  Temp Source: Oral  Pulse: 83  Heart Rate Source: Monitor  Resp: 18  BP: 118/65  BP Location: Right Arm  MAP (mmHg): 83  Patient Position: Semi fowlers  Level of Consciousness: Alert (0)  MEWS Score: 1  Patient Currently in Pain: Yes  Oxygen Therapy  SpO2: 94 %  O2 Device: Nasal cannula  O2 Flow Rate (L/min): 2 L/min  Social/Functional History  Social/Functional History  Lives With: Alone  Type of Home: Apartment  Home Layout: One level  Home Access: Stairs to enter with rails  Entrance Stairs - Number of Steps: 7  Entrance Stairs - Rails: Right  Bathroom Shower/Tub: Walk-in shower, Tub/Shower unit, Shower chair with back(cleans up at sink)  Bathroom Toilet: Standard(with raised toilet seat with rails)  Bathroom Equipment: Hand-held shower, Shower chair  Home Equipment: Cane, Standard walker, Alert Button  ADL Assistance: Independent  Homemaking Assistance: Needs assistance(daughter assist with laundry, cleaning, grocery shopping)  Ambulation Assistance: Independent(uses cane)  Transfer Assistance: Independent(uses cane)  Active : No       Objective   Vision: Impaired  Vision Exceptions: Wears glasses at all times; Wears glasses for reading  Hearing: Within functional limits    Orientation  Overall Orientation Status: Within Functional Limits  Orientation Level: Oriented X4     Balance  Sitting Balance: Supervision(seated EOB)  Standing Balance: Minimal assistance(Min A with cane; CGA with RW)  Functional Mobility  Functional - Mobility Device: Rolling Walker  Activity: Other(household distances in room)  Assist Level: Contact guard assistance  Functional Mobility Comments: CGA with use of RW; Min A with use of cane; pt attempting to use cane with both hands; no overt LOB  Wheelchair Bed Transfers  Wheelchair/Bed - Technique: Ambulating(RW)  Equipment Used: Bed;Other(bed to chair)  Level of Asssistance: Contact guard assistance  ADL  Additional Comments: Anticipate pt needing up to Min/Mod A for ADLs including dressing, bathing, and toileting based on ROM, strength, and balance. Tone RUE  RUE Tone: Normotonic  Tone LUE  LUE Tone: Normotonic  Coordination  Movements Are Fluid And Coordinated: Yes        Transfers  Sit to stand: Contact guard assistance  Stand to sit: Contact guard assistance  Transfer Comments: to/from RW     Cognition  Overall Cognitive Status: Exceptions  Arousal/Alertness: Appropriate responses to stimuli  Following Commands:  Follows all commands without difficulty  Attention Span: Appears intact  Memory: Appears intact  Safety Judgement: Decreased awareness of need for assistance  Insights: Decreased awareness of deficits  Initiation: Does not require cues  Sequencing: Does not require cues        Sensation  Overall Sensation Status: Impaired(intermittent numbness in B feet)        LUE AROM (degrees)  LUE AROM : WFL  RUE AROM (degrees)  RUE AROM : WFL  LUE Strength  Gross LUE Strength: WFL  RUE Strength  Gross RUE Strength: WFL                   Plan   Plan  Times per week: 3-5x  Current Treatment Recommendations: Strengthening, Functional Mobility Training, Gait Training, Endurance Training, Balance Training, Self-Care / ADL, Safety Education & Training, Patient/Caregiver Education & Training, Equipment Evaluation, Education, & procurement      AM-PAC Score        AM-PAC Inpatient Daily Activity Raw Score: 17 (04/03/21 1142)  AM-PAC Inpatient ADL T-Scale Score : 37.26 (04/03/21 1142)  ADL Inpatient CMS 0-100% Score: 50.11 (04/03/21 1142)  ADL Inpatient CMS G-Code Modifier : CK (04/03/21 1142)    Goals  Short term goals  Time Frame for Short term goals: prior to D/C  Short term goal 1: complete functional mobility and transfers Mod Ind  Short term goal 2: complete bathing and dressing Mod Ind  Short term goal 3: complete toileting Mod Ind  Short term goal 4: complete grooming at sink [address] term goals  Time Frame for Long term goals : STG=LTG  Patient Goals   Patient goals : return home       Therapy Time   Individual Concurrent Group Co-treatment   Time In 1059         Time Out 1140         Minutes 41         Timed Code Treatment Minutes: 26 Minutes(15 minute eval)       Maxine Montoya, OTR/L

## 2021-04-03 NOTE — PROGRESS NOTES
Potassium came back 3.3.  Emogene Shield notified.     Electronically signed by Scott Hernández RN on 4/3/2021 at 6:10 AM

## 2021-04-03 NOTE — PROGRESS NOTES
Potassium replacement given. See MAR.     Electronically signed by Jay Choudhary RN on 4/3/2021 at 6:31 AM

## 2021-04-03 NOTE — ED NOTES
Bed: A-17  Expected date: 4/2/21  Expected time: 8:40 PM  Means of arrival: [facility]  Comments:  455 Lina Herr RN  04/02/21 2053

## 2021-04-03 NOTE — H&P
Hospital Medicine  History and Physical    PCP: Lisa Toure MD  Patient Name: Marcia Carrillo    Date of Service: Pt seen/examined on 04/02/2021 and admitted to Inpatient with expected LOS greater than two midnights due to medical therapy    CHIEF COMPLAINT:  Pt c/o of shortness of breath, generalized weakness and fatigue, poor appetite. HISTORY OF PRESENT ILLNESS: Pt is an 78y.o. year-old female with a history of hypertension, hyperlipidemia and COPD. Presents to the emergency room for evaluation following a 4 to 5-day history of increasing shortness of breath, increasing weakness and fatigue, and her family reports that she has not been eating and has been losing weight. The patient lives alone and her family normally helps her, but they have not been able to as they are quarantining with COVID. Patient does not use oxygen at home. She has called EMS several times over the past few days. Today they found her to have an oxygen saturation of 88% on room air. In the emergency room she was found to have an acute COPD exacerbation. She is being admitted for further evaluation and treatment. Associated signs and symptoms do not include fever or chills, nausea, vomiting, abdominal pain, hemoptysis, hematochezia, diarrhea, constipation or urinary symptoms.       Past Medical History:        Diagnosis Date    HTN (hypertension)     Hypercholesterolemia     MAC (mycobacterium avium-intracellulare complex)     Osteoarthritis     Osteopetrosis     Papanicolaou smear of cervix with atypical squamous cells of undetermined significance (ASC-US) 1998    Shingles     Stress incontinence        Past Surgical History:        Procedure Laterality Date    BREAST SURGERY      CATARACT REMOVAL Left 2009    CHOLECYSTECTOMY      CT BONE MARROW BIOPSY  2/12/2021    CT BONE MARROW BIOPSY 2/12/2021 [facility] CT    INGUINAL HERNIA REPAIR Right 2015    SPINE SURGERY N/A 11/25/2020    T7 KYPHOPLASTY WITH BONE BIOPSY performed by Caren Caceres MD at [address] [place] [address] Place:  Adhesive tape, Codeine, Iodine, and Lisinopril    Medications Prior to Admission:    Prior to Admission medications    Medication Sig Start Date End Date Taking?  Authorizing Provider   metoprolol succinate (TOPROL XL) 100 MG extended release tablet TAKE ONE TABLET BY MOUTH DAILY **CALL TO MAKE APPOINTMENT WITH DOCTOR ** 2/26/21  Yes Mary Agudelo MD   buPROPion [facility] SR) 150 MG extended release tablet TAKE ONE TABLET BY MOUTH DAILY 2/26/21  Yes Mary Agudelo MD   fluticasone-salmeterol (ADVAIR) 250-50 MCG/DOSE AEPB INHALE ONE PUFF BY MOUTH TWICE A DAY 2/1/21  Yes Mary Agudelo MD   albuterol sulfate  (90 Base) MCG/ACT inhaler INHALE 2 PUFF(S) BY MOUTH EVERY 4 HOURS AS NEEDED FOR WHEEZING 2/1/21  Yes Mary Agudelo MD   tiZANidine (ZANAFLEX) 4 MG tablet TAKE ONE TABLET BY MOUTH THREE TIMES A DAY AS NEEDED 2/1/21  Yes Stewart Bryant MD   furosemide (LASIX) 20 MG tablet TAKE ONE TABLET BY MOUTH DAILY 1/20/21  Yes Mary Agudelo MD   hydroCHLOROthiazide (HYDRODIURIL) 25 MG tablet TAKE ONE TABLET BY MOUTH EVERY MORNING 1/20/21  Yes Mary Agudelo MD   dilTIAZem (TIAZAC) 120 MG extended release capsule TAKE ONE CAPSULE BY MOUTH once daily in the evening  Patient taking differently: Take 120 mg by mouth 2 times daily TAKE ONE CAPSULE BY MOUTH once daily in the evening 12/30/20  Yes Mary Agudelo MD   theophylline (UNIPHYL) 400 MG extended release tablet TAKE 1/2 TABLET BY MOUTH TWICE A DAY 12/29/20  Yes Mary Agudelo MD   Multiple Vitamins-Minerals (PRESERVISION AREDS PO) Take by mouth daily   Yes Historical Provider, MD   Multiple Vitamins-Minerals (MULTIVITAMIN ADULTS PO) Take by mouth   Yes Historical Provider, MD   vitamin C (ASCORBIC ACID) 500 MG tablet Take 500 mg by mouth daily   Yes Historical Provider, MD   docusate sodium (STOOL SOFTENER) 250 MG capsule Take 250 mg by mouth 2 times daily as needed for Constipation   Yes Historical Provider, MD   tiotropium (SPIRIVA HANDIHALER) 18 MCG inhalation capsule INHALE THE ENTIRE CONTENTS OF 1 CAPSULE ONCE A DAY USING HANDIHALER DEVICE 10/16/20  Yes Royal Coreen MD   acetaminophen (TYLENOL) 500 MG tablet Take 500 mg by mouth 3 times daily as needed. Yes Historical Provider, MD   aspirin 81 MG EC tablet Take 81 mg by mouth daily. Yes Historical Provider, MD   Incontinence Supply Disposable (INCONTINENCE BRIEF LARGE) MISC As needed. Approximately 150 per month 1/19/21   Royal Coreen MD   nabumetone (RELAFEN) 750 MG tablet TAKE ONE TABLET BY MOUTH DAILY 12/9/20   Royal Coreen MD   denosumab (PROLIA) 60 MG/ML SOSY SC injection Inject 1 mL into the skin once for 1 dose 10/22/20 10/22/20  ALYSIA Lacy CNP   dilTIAZem (CARDIZEM CD) 120 MG extended release capsule TAKE ONE CAPSULE BY MOUTH TWICE A DAY 7/7/20 12/30/20  Royal Coreen MD       Family History:       Problem Relation Age of Onset    Diabetes Mother     Cancer Sister     Coronary Art Dis Sister     Coronary Art Dis Brother     Stroke Brother        Social History:   TOBACCO:   reports that she quit smoking about 5 months ago. Her smoking use included cigarettes. She has a 32.50 pack-year smoking history. She has never used smokeless tobacco.  ETOH:   reports current alcohol use. OCCUPATION:      REVIEW OF SYSTEMS:  A full review of systems was performed and is negative except for that which appears in the HPI    Physical Exam:    Vitals: /71   Pulse 82   Temp 97.9 °F (36.6 °C) (Oral)   Resp 16   Ht 5' 7\" (1.702 m)   Wt 105 lb 6.1 oz (47.8 kg)   SpO2 94%   BMI 16.50 kg/m²   General appearance: WD/WN 78y.o. year-old female who is alert, appears stated age and is cooperative  HEENT: Head: Normocephalic, no lesions, without obvious abnormality. Eye: Normal external eye, conjunctiva, lids cornea, PEERL. Ears: Normal external ears. Non-tender. Nose: Normal external nose, mucus membranes and septum.   Pharynx: Dental Hygiene adequate. Normal buccal mucosa. Normal pharynx. Neck: no adenopathy, no carotid bruit, no JVD, supple, symmetrical, trachea midline and thyroid not enlarged, symmetric, no tenderness/mass/nodules  Lungs: scattered wheezing on auscultation bilaterally and no use of accessory muscles. Heart: regular rate and rhythm, S1, S2 normal, no murmur, click, rub or gallop and normal apical impulse  Abdomen: soft, non-tender; bowel sounds normal; no masses, no organomegaly  Extremities: extremities atraumatic, no cyanosis or edema and Homans sign is negative, no sign of DVT. Capillary Refill: Acceptable < 3 seconds   Peripheral Pulses: +3 easily felt, not easily obliterated with pressures   Skin: Skin color, texture, turgor normal. No rashes or lesions on exposed skin  Neurologic: Neurovascularly intact without any focal sensory/motor deficits. Cranial nerves: II-XII intact, grossly non-focal. Gait was not tested. Mental Status: Alert and oriented, thought content appropriate, normal insight    CBC:   Recent Labs     04/02/21 2127 04/03/21  0439   WBC 8.0 6.9   HGB 12.5 11.5*    351     BMP:    Recent Labs     04/02/21 2127 04/03/21  0439    141   K 3.7 3.3*   CL 92* 94*   CO2 39* 41*   BUN 32* 36*   CREATININE 1.0 1.0   GLUCOSE 104* 144*     Troponin:   Recent Labs     04/02/21 2127   TROPONINI <0.01     PT/INR:  No results found for: PTINR  U/A:    Lab Results   Component Value Date    LEUKOCYTESUR MODERATE 11/24/2020    NITRITE pos 10/22/2020    RBCUA 5 11/24/2020    SPECGRAV 1.021 11/24/2020    UROBILINOGEN 0.2 11/24/2020    BILIRUBINUR Negative 11/24/2020    BILIRUBINUR neg 10/22/2020    BLOODU Negative 11/24/2020    GLUCOSEU Negative 11/24/2020    PROTEINU 30 11/24/2020         RAD:   I have independently reviewed and interpreted the imaging studies below and based my recommendations to the patient on those findings.     Xr Thoracolumbar Spine (min 2 Views)    Result Date: 3/12/2021  EXAMINATION: TWO XRAY VIEWS OF THE THORACIC AND LUMBAR SPINE 3/12/2021 2:17 pm COMPARISON: 02/05/2021 HISTORY: ORDERING SYSTEM PROVIDED HISTORY: Fracture of vertebra due to osteoporosis, initial encounter [facility]) TECHNOLOGIST PROVIDED HISTORY: Reason for Exam: path fx Acuity: Unknown Type of Exam: Initial Relevant Medical/Surgical History: pain FINDINGS: Status post T7 vertebroplasty. Stable T7 compression fracture. Stable wedge compression fractures of T5 and T6, with focal kyphosis at T6-T7. No new fracture demonstrated. The bones are diffusely osteopenic and there is thoracolumbar scoliosis. There is degenerative disc disease at T7-T8 through T11-T12 and at L1-L2     Stable compression fractures of T5, T6, and T7, status post T7 vertebroplasty. No new fracture     Xr Chest Portable    Result Date: 4/2/2021  EXAMINATION: ONE XRAY VIEW OF THE CHEST 4/2/2021 9:40 pm COMPARISON: 01/26/2014 HISTORY: ORDERING SYSTEM PROVIDED HISTORY: soa TECHNOLOGIST PROVIDED HISTORY: Reason for exam:->soa Reason for Exam: sob FINDINGS: The patient is rotated. Scarring or atelectasis is noted in the lingula. The lungs are otherwise clear. The heart size is at the upper limits of normal.  There is no large pleural effusion or definite evidence for pneumothorax. There are old bilateral rib fractures. No acute disease. Assessment:   Principal Problem:    COPD exacerbation ([address]  Active Problems:    Essential hypertension    Hypercholesterolemia LDL goal < 160    Acute respiratory failure with hypoxia (HCC)    Generalized weakness    Fatigue    Poor appetite  Resolved Problems:    * No resolved hospital problems. *      Plan:       COPD (acute exacerbation) - Patient has been started on Nebulizer treatments, Solumedrol and Antibiotics have been prescribed. The Solumedrol will be tapered as the patient improves. Patient will be monitored closely, and deep breathing and coughing will be encouraged while awake. Acute respiratory failure/Hypoxia/Shortness of Breath - due to above; provide supplemental oxygen as necessary to keep SaO2 92% or greater. Generalized weakness/fatigue - Will ask PT/OT to evaluate and treat patient, and if necessary to provide recommendations for post hospital therapy    Poor appetite - will provide dietary supplements. Essential (primary) hypertension - continue home meds and monitor blood pressure    Hyperlipidemia - No currently on statin therapy. Defer to PCP        DVT Prophylaxis: Lovenox  Diet: DIET GENERAL;  Code Status: Full Code  (Advanced care planning has been discussed with patient and/or responsible family member and is reflected in the code status.  Further orders associated with this have been entered if appropriate)    Disposition: Anticipate that patient will remain in the hospital for 2 to 3 days depending on further evaluation and clinical course     Please note that over 50 minutes was spent in evaluating the patient, review of records and results, discussion with staff/family, etc.    Isamar Sullivan MD

## 2021-04-03 NOTE — PLAN OF CARE
Problem: Nutrition  Goal: Optimal nutrition therapy  Outcome: Ongoing     Nutrition Problem #1: Inadequate oral intake  Intervention: Food and/or Nutrient Delivery: Continue Current Diet, Continue Oral Nutrition Supplement  Nutritional Goals:  Tolerate diet and consume greater than 50% of meals and supplements this admission

## 2021-04-03 NOTE — PROGRESS NOTES
option. IF home, she will need 24/7 assist, a wheeled walker and home PT, level 3. Will con't to follow  Prognosis: Fair;Good  Decision Making: Medium Complexity  History: see above  Exam: see above  Clinical Presentation: evolving  PT Education: PT Role;General Safety;Transfer Training; Adaptive Device Training;Gait Training  Barriers to Learning: none  REQUIRES PT FOLLOW UP: Yes  Activity Tolerance  Activity Tolerance: Patient limited by pain; Patient limited by endurance; Patient limited by fatigue       Patient Diagnosis(es): The primary encounter diagnosis was COPD exacerbation ([facility] [address] Diagnoses of Hypoxia, Shortness of breath, and Fatigue, unspecified type were also pertinent to this visit. has a past medical history of HTN (hypertension), Hypercholesterolemia, MAC (mycobacterium avium-intracellulare complex), Osteoarthritis, Osteopetrosis, Papanicolaou smear of cervix with atypical squamous cells of undetermined significance (ASC-US), Shingles, and Stress incontinence. has a past surgical history that includes Cholecystectomy; Breast surgery; Cataract removal (Left, 2009); Inguinal hernia repair (Right, 2015); Spine surgery (N/A, 11/25/2020); and CT BIOPSY BONE MARROW (2/12/2021). Restrictions  Restrictions/Precautions  Restrictions/Precautions: Fall Risk  Position Activity Restriction  Other position/activity restrictions: 2L O2  Vision/Hearing  Vision: Impaired  Vision Exceptions: Wears glasses at all times; Wears glasses for reading  Hearing: Within functional limits     Subjective  General  Chart Reviewed: Yes  Patient assessed for rehabilitation services?: Yes  Additional Pertinent Hx: Per H&P on 4-2-2021 of Opal Hidalgo MD: The pt is an 77 yo female who came to the ED with 4-5 day h/o of increasing SOB, weakness and fatigue. The pt was found to have O2 sats of 88% on room air.       PMHx: HTN, OA, shingles, T7 kyphopasty (2020)  Response To Previous Treatment: Not applicable  Family / Caregiver Present: No  Referring Practitioner: ALYSIA Pool CNP  Referral Date : 04/03/21  Diagnosis: COPD exacerbation, Acute respiratory failure/Hypoxia/Shortness of Breath, Generalized weakness/fatigue  Follows Commands: Within Functional Limits  Subjective  Subjective: the pt was found to be in the bed upon arrival; she was agreeable to therapy and reported chronic back pain with mobility  Pain Screening  Patient Currently in Pain: Yes          Orientation  Orientation  Overall Orientation Status: Within Functional Limits  Social/Functional History  Social/Functional History  Lives With: Alone  Type of Home: Apartment  Home Layout: One level  Home Access: Stairs to enter with rails  Entrance Stairs - Number of Steps: 7  Entrance Stairs - Rails: Right  Bathroom Shower/Tub: Walk-in shower, Tub/Shower unit, Shower chair with back(cleans up at sink)  Bathroom Toilet: Standard(with raised toilet seat with rails)  Bathroom Equipment: Hand-held shower, Shower chair  Home Equipment: Cane, Standard walker, Alert Button  ADL Assistance: Independent  Homemaking Assistance: Needs assistance(daughter assist with laundry, cleaning, grocery shopping)  Ambulation Assistance: Independent(uses cane)  Transfer Assistance: Independent(uses cane)  Active : No  Cognition   Cognition  Overall Cognitive Status: Exceptions  Arousal/Alertness: Appropriate responses to stimuli  Following Commands:  Follows all commands without difficulty  Attention Span: Appears intact  Memory: Appears intact  Safety Judgement: Decreased awareness of need for assistance  Insights: Decreased awareness of deficits  Initiation: Does not require cues  Sequencing: Does not require cues    Objective  AROM RLE (degrees)  RLE AROM: WFL  AROM LLE (degrees)  LLE AROM : WFL  Strength RLE  Comment: functionally fair; increased pain with hip flexion  Strength LLE  Comment: functionally fair; increased pain with hip flexion        Bed mobility  Supine to Sit: Stand by assistance(HOB elevated and with increased time)  Sit to Supine: Unable to assess  Scooting: Stand by assistance(back in the chair)  Transfers  Sit to Stand: Contact guard assistance  Stand to sit: Contact guard assistance  Ambulation  Ambulation?: Yes  More Ambulation?: Yes  Ambulation 1  Surface: level tile  Device: Single point cane  Other Apparatus: O2(2L O2, line managed by therapist)  Assistance: Minimal assistance  Quality of Gait: tends to use 2 hands on the cane positioned in front of her; flexed posture; unsteady  Distance: 4-5 steps from bed > chair  Ambulation 2  Surface - 2: level tile  Device 2: Rolling Walker  Other Apparatus 2: O2(2L O2, line managed by therapist)  Assistance 2: Contact guard assistance  Quality of Gait 2: more upright posture, responded well to cues to stay w/in the frame of the walker; needed some assist for management of the walker on turns  Distance: 20 feet x 1     Balance  Sitting - Static: Good  Sitting - Dynamic: Good;-  Standing - Static: Fair  Standing - Dynamic: Fair  Comments: the pt's balance was better when using the walker and she had a more upright posture with the walker vs the cane        Plan   Plan  Times per week: 3-5x/week  Current Treatment Recommendations: Functional Mobility Training, Transfer Training, Gait Training, ROM, Strengthening  Safety Devices  Type of devices: Call light within reach, Chair alarm in place, Gait belt, Patient at risk for falls, Left in chair, Nurse notified      AM-PAC Score  -PAC Inpatient Mobility Raw Score : 15 (04/03/21 1150)  AM-PAC Inpatient T-Scale Score : 39.45 (04/03/21 1150)  Mobility Inpatient CMS 0-100% Score: 57.7 (04/03/21 1150)  Mobility Inpatient CMS G-Code Modifier : CK (04/03/21 1150)          Goals  Short term goals  Time Frame for Short term goals: upon d/c  Short term goal 1: Bed mobility wit flat bed with SBA. Short term goal 2: Transfer sit <> stand with SBA.   Short term goal 3: Ambulate with wheeled walker 50 feet with SBA.   Patient Goals   Patient goals : to go home       Therapy Time   Individual Concurrent Group Co-treatment   Time In 1102         Time Out 1145         Minutes 43         Timed Code Treatment Minutes: 28 Minutes     Electronically signed by Walt Shabazz, PT [id number] on 4/3/2021 at 11:50 AM

## 2021-04-04 LAB
ANION GAP SERPL CALCULATED.3IONS-SCNC: 13 MMOL/L (ref 3–16)
BASOPHILS ABSOLUTE: 0 K/UL (ref 0–0.2)
BASOPHILS RELATIVE PERCENT: 0.1 %
BUN BLDV-MCNC: 56 MG/DL (ref 7–20)
CALCIUM SERPL-MCNC: 8.6 MG/DL (ref 8.3–10.6)
CHLORIDE BLD-SCNC: 94 MMOL/L (ref 99–110)
CO2: 32 MMOL/L (ref 21–32)
CREAT SERPL-MCNC: 1.6 MG/DL (ref 0.6–1.2)
EOSINOPHILS ABSOLUTE: 0 K/UL (ref 0–0.6)
EOSINOPHILS RELATIVE PERCENT: 0 %
GFR AFRICAN AMERICAN: 37
GFR NON-AFRICAN AMERICAN: 31
GLUCOSE BLD-MCNC: 147 MG/DL (ref 70–99)
HCT VFR BLD CALC: 31.2 % (ref 36–48)
HEMOGLOBIN: 10.1 G/DL (ref 12–16)
LYMPHOCYTES ABSOLUTE: 0.3 K/UL (ref 1–5.1)
LYMPHOCYTES RELATIVE PERCENT: 2.4 %
MCH RBC QN AUTO: 29.6 PG (ref 26–34)
MCHC RBC AUTO-ENTMCNC: 32.5 G/DL (ref 31–36)
MCV RBC AUTO: 91.1 FL (ref 80–100)
MONOCYTES ABSOLUTE: 0.3 K/UL (ref 0–1.3)
MONOCYTES RELATIVE PERCENT: 1.7 %
NEUTROPHILS ABSOLUTE: 13.7 K/UL (ref 1.7–7.7)
NEUTROPHILS RELATIVE PERCENT: 95.8 %
PDW BLD-RTO: 15.3 % (ref 12.4–15.4)
PLATELET # BLD: 311 K/UL (ref 135–450)
PMV BLD AUTO: 8.1 FL (ref 5–10.5)
POTASSIUM SERPL-SCNC: 3.3 MMOL/L (ref 3.5–5.1)
RBC # BLD: 3.42 M/UL (ref 4–5.2)
SODIUM BLD-SCNC: 139 MMOL/L (ref 136–145)
STREP PNEUMONIAE ANTIGEN, URINE: NORMAL
WBC # BLD: 14.3 K/UL (ref 4–11)

## 2021-04-04 PROCEDURE — 85025 COMPLETE CBC W/AUTO DIFF WBC: CPT

## 2021-04-04 PROCEDURE — 1200000000 HC SEMI PRIVATE

## 2021-04-04 PROCEDURE — 6370000000 HC RX 637 (ALT 250 FOR IP): Performed by: NURSE PRACTITIONER

## 2021-04-04 PROCEDURE — 2700000000 HC OXYGEN THERAPY PER DAY

## 2021-04-04 PROCEDURE — 6370000000 HC RX 637 (ALT 250 FOR IP): Performed by: HOSPITALIST

## 2021-04-04 PROCEDURE — 94640 AIRWAY INHALATION TREATMENT: CPT

## 2021-04-04 PROCEDURE — 2580000003 HC RX 258: Performed by: HOSPITALIST

## 2021-04-04 PROCEDURE — 6370000000 HC RX 637 (ALT 250 FOR IP): Performed by: INTERNAL MEDICINE

## 2021-04-04 PROCEDURE — 36415 COLL VENOUS BLD VENIPUNCTURE: CPT

## 2021-04-04 PROCEDURE — 80048 BASIC METABOLIC PNL TOTAL CA: CPT

## 2021-04-04 PROCEDURE — 94760 N-INVAS EAR/PLS OXIMETRY 1: CPT

## 2021-04-04 PROCEDURE — 6360000002 HC RX W HCPCS: Performed by: NURSE PRACTITIONER

## 2021-04-04 PROCEDURE — 2580000003 HC RX 258: Performed by: NURSE PRACTITIONER

## 2021-04-04 RX ORDER — POTASSIUM CHLORIDE 20 MEQ/1
40 TABLET, EXTENDED RELEASE ORAL PRN
Status: DISCONTINUED | OUTPATIENT
Start: 2021-04-04 | End: 2021-04-04

## 2021-04-04 RX ORDER — POTASSIUM CHLORIDE 7.45 MG/ML
10 INJECTION INTRAVENOUS PRN
Status: DISCONTINUED | OUTPATIENT
Start: 2021-04-04 | End: 2021-04-04

## 2021-04-04 RX ORDER — SODIUM CHLORIDE 9 MG/ML
INJECTION, SOLUTION INTRAVENOUS CONTINUOUS
Status: DISCONTINUED | OUTPATIENT
Start: 2021-04-04 | End: 2021-04-06

## 2021-04-04 RX ADMIN — METHYLPREDNISOLONE SODIUM SUCCINATE 40 MG: 40 INJECTION, POWDER, FOR SOLUTION INTRAMUSCULAR; INTRAVENOUS at 14:51

## 2021-04-04 RX ADMIN — METHYLPREDNISOLONE SODIUM SUCCINATE 40 MG: 40 INJECTION, POWDER, FOR SOLUTION INTRAMUSCULAR; INTRAVENOUS at 11:09

## 2021-04-04 RX ADMIN — BUPROPION HYDROCHLORIDE 150 MG: 150 TABLET, EXTENDED RELEASE ORAL at 11:07

## 2021-04-04 RX ADMIN — ACETAMINOPHEN 650 MG: 325 TABLET ORAL at 06:08

## 2021-04-04 RX ADMIN — IPRATROPIUM BROMIDE AND ALBUTEROL SULFATE 1 AMPULE: .5; 3 SOLUTION RESPIRATORY (INHALATION) at 11:51

## 2021-04-04 RX ADMIN — IPRATROPIUM BROMIDE AND ALBUTEROL SULFATE 1 AMPULE: .5; 3 SOLUTION RESPIRATORY (INHALATION) at 07:52

## 2021-04-04 RX ADMIN — ASPIRIN 81 MG: 81 TABLET, COATED ORAL at 11:07

## 2021-04-04 RX ADMIN — ACETAMINOPHEN 650 MG: 325 TABLET ORAL at 22:05

## 2021-04-04 RX ADMIN — IPRATROPIUM BROMIDE AND ALBUTEROL SULFATE 1 AMPULE: .5; 3 SOLUTION RESPIRATORY (INHALATION) at 21:16

## 2021-04-04 RX ADMIN — POTASSIUM BICARBONATE 40 MEQ: 782 TABLET, EFFERVESCENT ORAL at 13:22

## 2021-04-04 RX ADMIN — ENOXAPARIN SODIUM 40 MG: 40 INJECTION SUBCUTANEOUS at 11:06

## 2021-04-04 RX ADMIN — BUDESONIDE AND FORMOTEROL FUMARATE DIHYDRATE 2 PUFF: 160; 4.5 AEROSOL RESPIRATORY (INHALATION) at 21:16

## 2021-04-04 RX ADMIN — DILTIAZEM HYDROCHLORIDE 120 MG: 120 CAPSULE, COATED, EXTENDED RELEASE ORAL at 22:05

## 2021-04-04 RX ADMIN — DOXYCYCLINE HYCLATE 100 MG: 100 TABLET, FILM COATED ORAL at 22:05

## 2021-04-04 RX ADMIN — DOXYCYCLINE HYCLATE 100 MG: 100 TABLET, FILM COATED ORAL at 11:07

## 2021-04-04 RX ADMIN — SODIUM CHLORIDE, PRESERVATIVE FREE 10 ML: 5 INJECTION INTRAVENOUS at 22:06

## 2021-04-04 RX ADMIN — SODIUM CHLORIDE: 9 INJECTION, SOLUTION INTRAVENOUS at 22:17

## 2021-04-04 RX ADMIN — SODIUM CHLORIDE, PRESERVATIVE FREE 10 ML: 5 INJECTION INTRAVENOUS at 11:10

## 2021-04-04 RX ADMIN — DILTIAZEM HYDROCHLORIDE 120 MG: 120 CAPSULE, COATED, EXTENDED RELEASE ORAL at 11:09

## 2021-04-04 RX ADMIN — BUDESONIDE AND FORMOTEROL FUMARATE DIHYDRATE 2 PUFF: 160; 4.5 AEROSOL RESPIRATORY (INHALATION) at 07:51

## 2021-04-04 RX ADMIN — IPRATROPIUM BROMIDE AND ALBUTEROL SULFATE 1 AMPULE: .5; 3 SOLUTION RESPIRATORY (INHALATION) at 15:43

## 2021-04-04 RX ADMIN — DOCUSATE SODIUM 200 MG: 100 CAPSULE, LIQUID FILLED ORAL at 11:09

## 2021-04-04 RX ADMIN — METHYLPREDNISOLONE SODIUM SUCCINATE 40 MG: 40 INJECTION, POWDER, FOR SOLUTION INTRAMUSCULAR; INTRAVENOUS at 02:33

## 2021-04-04 RX ADMIN — METHYLPREDNISOLONE SODIUM SUCCINATE 40 MG: 40 INJECTION, POWDER, FOR SOLUTION INTRAMUSCULAR; INTRAVENOUS at 22:06

## 2021-04-04 RX ADMIN — SODIUM CHLORIDE: 9 INJECTION, SOLUTION INTRAVENOUS at 11:20

## 2021-04-04 ASSESSMENT — PAIN DESCRIPTION - DESCRIPTORS: DESCRIPTORS: ACHING

## 2021-04-04 ASSESSMENT — PAIN DESCRIPTION - ONSET: ONSET: ON-GOING

## 2021-04-04 ASSESSMENT — PAIN DESCRIPTION - PAIN TYPE
TYPE: CHRONIC PAIN
TYPE: CHRONIC PAIN

## 2021-04-04 ASSESSMENT — PAIN - FUNCTIONAL ASSESSMENT: PAIN_FUNCTIONAL_ASSESSMENT: PREVENTS OR INTERFERES SOME ACTIVE ACTIVITIES AND ADLS

## 2021-04-04 ASSESSMENT — PAIN SCALES - GENERAL: PAINLEVEL_OUTOF10: 10

## 2021-04-04 ASSESSMENT — PAIN DESCRIPTION - ORIENTATION: ORIENTATION: LOWER

## 2021-04-04 NOTE — PLAN OF CARE
Problem: Pain:  Description: Pain management should include both nonpharmacologic and pharmacologic interventions.   Goal: Pain level will decrease  Description: Pain level will decrease  Outcome: Met This Shift  Goal: Control of acute pain  Description: Control of acute pain  Outcome: Met This Shift  Goal: Control of chronic pain  Description: Control of chronic pain  Outcome: Met This Shift     Problem: Falls - Risk of:  Goal: Will remain free from falls  Description: Will remain free from falls  Outcome: Ongoing  Goal: Absence of physical injury  Description: Absence of physical injury  Outcome: Ongoing     Problem: Skin Integrity:  Goal: Will show no infection signs and symptoms  Description: Will show no infection signs and symptoms  Outcome: Ongoing  Goal: Absence of new skin breakdown  Description: Absence of new skin breakdown  Outcome: Ongoing     Problem: Breathing Pattern - Ineffective:  Goal: Ability to achieve and maintain a regular respiratory rate will improve  Description: Ability to achieve and maintain a regular respiratory rate will improve  Outcome: Ongoing     Problem: Infection:  Goal: Will remain free from infection  Description: Will remain free from infection  Outcome: Ongoing     Problem: Safety:  Goal: Free from accidental physical injury  Description: Free from accidental physical injury  Outcome: Ongoing  Goal: Free from intentional harm  Description: Free from intentional harm  Outcome: Ongoing     Problem: Daily Care:  Goal: Daily care needs are met  Description: Daily care needs are met  Outcome: Ongoing     Problem: Discharge Planning:  Goal: Patients continuum of care needs are met  Description: Patients continuum of care needs are met  Outcome: Ongoing     Problem: Nutrition  Goal: Optimal nutrition therapy  Outcome: Ongoing

## 2021-04-04 NOTE — PROGRESS NOTES
Patient is alert & oriented x4, x1 assist with walker, 2/4 bed rails up, bed in lowest position, fall precautions in place, call light within reach. Will cont to monitor and reassess.  Electronically signed by Demetria Sr RN on 4/4/2021 at 2:19 PM

## 2021-04-04 NOTE — PROGRESS NOTES
Patient A&O. No complaints at this time. Call light within reach, able to make needs knows, fall precautions in place. Will continue to monitor.  Electronically signed by Sherryle Hill, RN on 4/4/2021 at 6:21 PM

## 2021-04-04 NOTE — PROGRESS NOTES
Hospitalist Progress Note      PCP: Lisa Toure MD    Date of Admission: 4/2/2021        Subjective:   Patient choked on her eggs this morning. . Shortness of breath is however improving. Still has productive cough with yellow sputum. . No fevers or chills      Medications:  Reviewed    Infusion Medications    sodium chloride       Scheduled Medications    sodium chloride flush  10 mL Intravenous 2 times per day    enoxaparin  40 mg Subcutaneous Daily    ipratropium-albuterol  1 ampule Inhalation Q4H WA    methylPREDNISolone  40 mg Intravenous Q6H    Followed by   Seun Elder ON 4/5/2021] predniSONE  40 mg Oral Daily    aspirin  81 mg Oral Daily    buPROPion  150 mg Oral Daily    dilTIAZem  120 mg Oral BID    budesonide-formoterol  2 puff Inhalation BID    furosemide  20 mg Oral Daily    hydroCHLOROthiazide  25 mg Oral Daily    metoprolol succinate  100 mg Oral Daily    celecoxib  200 mg Oral Daily    doxycycline hyclate  100 mg Oral 2 times per day     PRN Meds: sodium chloride flush, sodium chloride, promethazine **OR** ondansetron, polyethylene glycol, acetaminophen **OR** acetaminophen, albuterol, docusate sodium      Intake/Output Summary (Last 24 hours) at 4/4/2021 0835  Last data filed at 4/4/2021 0620  Gross per 24 hour   Intake 970 ml   Output 200 ml   Net 770 ml       Exam:    /62   Pulse 72   Temp 97.9 °F (36.6 °C) (Oral)   Resp 16   Ht 5' 7\" (1.702 m)   Wt 106 lb 0.7 oz (48.1 kg)   SpO2 93%   BMI 16.61 kg/m²     General appearance: No apparent distress, appears stated age and cooperative. HEENT: Pupils equal, round, and reactive to light. Conjunctivae/corneas clear. Neck: Supple, with full range of motion. No jugular venous distention. Trachea midline. Respiratory:  Normal respiratory effort. Clear to auscultation, bilaterally without Rales/Wheezes/Rhonchi. Cardiovascular: Regular rate and rhythm with normal S1/S2 without murmurs, rubs or gallops.   Abdomen: Soft, non-tender, non-distended with normal bowel sounds. Musculoskeletal: No clubbing, cyanosis or edema bilaterally. Full range of motion without deformity. Skin: Skin color, texture, turgor normal.  No rashes or lesions. Neurologic:  Neurovascularly intact without any focal sensory/motor deficits. Cranial nerves: II-XII intact, grossly non-focal.  Psychiatric: Alert and oriented, thought content appropriate, normal insight  Capillary Refill: Brisk,< 3 seconds   Peripheral Pulses: +2 palpable, equal bilaterally       Labs:   Recent Labs     04/02/21 2127 04/03/21 0439 04/04/21  0536   WBC 8.0 6.9 14.3*   HGB 12.5 11.5* 10.1*   HCT 38.1 34.7* 31.2*    351 311     Recent Labs     04/02/21 2127 04/03/21 0439 04/04/21  0536    141 139   K 3.7 3.3* 3.3*   CL 92* 94* 94*   CO2 39* 41* 32   BUN 32* 36* 56*   CREATININE 1.0 1.0 1.6*   CALCIUM 10.1 9.5 8.6     Recent Labs     04/02/21 2127 04/03/21 0439   AST 11* 10*   ALT <5* <5*   BILITOT 0.3 0.3   ALKPHOS 157* 142*     No results for input(s): INR in the last 72 hours. Recent Labs     04/02/21 2127   TROPONINI <0.01       Assessment/Plan:    -COPD exacerbation. . Continue bronchodilators, Solu-Medrol and doxycycline. .chest x-ray is clear. Follow-up on sputum culture    -Acute respiratory failure with hypoxia due to COPD exacerbation. Requiring up to 3 L nasal cannula, wean down to 1 L nasal cannula. continue to wean oxygen as tolerated. . home 02 eval on dc    -Reactive leukocytosis due to COPD and steroids--continue to monitor    -Generalized weakness due to COPD exacerbation--PT and OT was consulted-disposition to SNF versus home health--patient prefers home health    -Essential hypertension stable-continue diltiazem, metoprolol.   Held Lasix and HCTZ due to RASHAWN    -Hyperlipidemia-not on statin therapy-follow-up with PCP    -Hypokalemia-continue to replete potassium    -RASHAWN--creatinine up to 1.6 from baseline of 1.0.. Likely prerenal related to relative hypotension, NSAIDs and diuretic use. .. Stopped diuretics and NSAIDs. . Started IV fluids. . Avoid hypotension    DVT Prophylaxis: Lovenox  Diet: DIET GENERAL;  Dietary Nutrition Supplements: Standard Oral Supplement  Code Status: Full Code        Ivonne Bryan MD

## 2021-04-04 NOTE — PROGRESS NOTES
Pharmacy has discontinued the Potassium Protocol per hospital policy    CrCl = 21    Physician needs to order each dose

## 2021-04-04 NOTE — PLAN OF CARE
Problem: Pain:  Goal: Pain level will decrease  Description: Pain level will decrease  4/4/2021 1955 by Laura Teajda RN  Outcome: Ongoing  4/4/2021 1352 by Rowan Ledezma RN  Outcome: Met This Shift  Goal: Control of acute pain  Description: Control of acute pain  4/4/2021 1955 by Laura Tejada RN  Outcome: Ongoing  4/4/2021 1352 by Rowan Ledezma RN  Outcome: Met This Shift  Goal: Control of chronic pain  Description: Control of chronic pain  4/4/2021 1955 by Laura Tejada RN  Outcome: Ongoing  4/4/2021 1352 by Rowan Ledezma RN  Outcome: Met This Shift     Problem: Falls - Risk of:  Goal: Will remain free from falls  Description: Will remain free from falls  4/4/2021 1955 by Laura Tejada RN  Outcome: Ongoing  4/4/2021 1352 by Rowan Ledezma RN  Outcome: Ongoing  Goal: Absence of physical injury  Description: Absence of physical injury  4/4/2021 1955 by Laura Tejada RN  Outcome: Ongoing  4/4/2021 1352 by Rowan Ledezma RN  Outcome: Ongoing     Problem: Skin Integrity:  Goal: Will show no infection signs and symptoms  Description: Will show no infection signs and symptoms  4/4/2021 1955 by Laura Tejada RN  Outcome: Ongoing  4/4/2021 1352 by Rowan Ledezma RN  Outcome: Ongoing  Goal: Absence of new skin breakdown  Description: Absence of new skin breakdown  4/4/2021 1955 by Laura Tejada RN  Outcome: Ongoing  4/4/2021 1352 by Rowan Ledezma RN  Outcome: Ongoing     Problem: Breathing Pattern - Ineffective:  Goal: Ability to achieve and maintain a regular respiratory rate will improve  Description: Ability to achieve and maintain a regular respiratory rate will improve  4/4/2021 1955 by Laura Tejada RN  Outcome: Ongoing  4/4/2021 1352 by Rowan Ledezma RN  Outcome: Ongoing     Problem: Infection:  Goal: Will remain free from infection  Description: Will remain free from infection  4/4/2021 1955 by Laura Tejada RN  Outcome: Ongoing  4/4/2021 1352 by Rowan Ledezma RN  Outcome: Ongoing     Problem: Safety:  Goal: Free from accidental physical injury  Description: Free from accidental physical injury  4/4/2021 1955 by Aries Barth RN  Outcome: Ongoing  4/4/2021 1352 by Geovanni Grant RN  Outcome: Ongoing  Goal: Free from intentional harm  Description: Free from intentional harm  4/4/2021 1955 by Aries Barth RN  Outcome: Ongoing  4/4/2021 1352 by Geovanni Grant RN  Outcome: Ongoing     Problem: Daily Care:  Goal: Daily care needs are met  Description: Daily care needs are met  4/4/2021 1955 by Aries Barth RN  Outcome: Ongoing  4/4/2021 1352 by Geovanni Grant RN  Outcome: Ongoing     Problem: Discharge Planning:  Goal: Patients continuum of care needs are met  Description: Patients continuum of care needs are met  4/4/2021 1955 by Aries Barth RN  Outcome: Ongoing  4/4/2021 1352 by Geovanni Grant RN  Outcome: Ongoing     Problem: Nutrition  Goal: Optimal nutrition therapy  4/4/2021 1955 by Aries Barth RN  Outcome: Ongoing  4/4/2021 1352 by Geovanni Grant RN  Outcome: Ongoing

## 2021-04-04 NOTE — PROGRESS NOTES
Pt on 1 liter O2 stated \"Help, I can't swallow this egg it went down wrong. . I can still breathe but its burning\". RN offered fluids, sit patient at edge of bed and pat back to clear airway. Pt confirmed that airway is clear and discomfort was relieved. Speech consult placed. Dr. Xin Arzate at bedside.

## 2021-04-04 NOTE — PLAN OF CARE
Problem: Pain:  Goal: Pain level will decrease  Description: Pain level will decrease  4/3/2021 2053 by Maddy Buck  Outcome: Ongoing  4/3/2021 1917 by Bret Molina RN  Outcome: Ongoing  Note: Pt able to express presence and absence of pain using numerical pain scale. Pt pain is managed by PRN analgesics as ordered by MD. Pain reassess after each interventions. Will continue to monitor as needed. Goal: Control of acute pain  Description: Control of acute pain  4/3/2021 2053 by Maddy Buck  Outcome: Ongoing  4/3/2021 1917 by Bret Molnia RN  Outcome: Ongoing  Goal: Control of chronic pain  Description: Control of chronic pain  4/3/2021 2053 by Maddy Buck  Outcome: Ongoing  4/3/2021 1917 by Bret Molian RN  Outcome: Ongoing     Problem: Falls - Risk of:  Goal: Will remain free from falls  Description: Will remain free from falls  4/3/2021 2053 by Maddy Buck  Outcome: Ongoing  4/3/2021 1917 by Bret Molina RN  Outcome: Ongoing  Note: Pt free from falls this shift. Non skid socks provided. Bed and chair alarm used. Call light always within reach. Pt able and agreeable to contact for safety appropriately. Goal: Absence of physical injury  Description: Absence of physical injury  4/3/2021 2053 by Maddy Buck  Outcome: Ongoing  4/3/2021 1917 by Bret Molina RN  Outcome: Ongoing     Problem: Skin Integrity:  Goal: Will show no infection signs and symptoms  Description: Will show no infection signs and symptoms  4/3/2021 2053 by Maddy Buck  Outcome: Ongoing  4/3/2021 1917 by Bret Molina RN  Outcome: Ongoing  Note: Skin assessment performed each shift per protocol. Pt encouraged to reposition often. Will continue to monitor and assess for skin breakdown.      Goal: Absence of new skin breakdown  Description: Absence of new skin breakdown  Outcome: Ongoing     Problem: Breathing Pattern - Ineffective:  Goal: Ability to achieve and maintain a regular respiratory rate will improve  Description: Ability to achieve and maintain a regular respiratory rate will improve  Outcome: Ongoing     Problem: Infection:  Goal: Will remain free from infection  Description: Will remain free from infection  4/3/2021 2053 by Thad Wilcox  Outcome: Ongoing  4/3/2021 1917 by Emmy Stearns RN  Outcome: Ongoing     Problem: Safety:  Goal: Free from accidental physical injury  Description: Free from accidental physical injury  4/3/2021 2053 by Thad Wilcox  Outcome: Ongoing  4/3/2021 1917 by Emmy Stearns RN  Outcome: Ongoing  Goal: Free from intentional harm  Description: Free from intentional harm  4/3/2021 2053 by Thad Wilcox  Outcome: Ongoing  4/3/2021 1917 by Emmy Stearns RN  Outcome: Ongoing     Problem: Daily Care:  Goal: Daily care needs are met  Description: Daily care needs are met  4/3/2021 2053 by Thad Wilcox  Outcome: Ongoing  4/3/2021 1917 by Emmy Stearns RN  Outcome: Ongoing     Problem: Discharge Planning:  Goal: Patients continuum of care needs are met  Description: Patients continuum of care needs are met  4/3/2021 2053 by Thad Wilcox  Outcome: Ongoing  4/3/2021 1917 by Emmy Stearns RN  Outcome: Ongoing     Problem: Nutrition  Goal: Optimal nutrition therapy  4/3/2021 2053 by Thad Wilcox  Outcome: Ongoing  4/3/2021 1917 by Emmy Stearns RN  Outcome: Ongoing  4/3/2021 1219 by Martha Livingston RD, LD  Outcome: Ongoing

## 2021-04-05 ENCOUNTER — APPOINTMENT (OUTPATIENT)
Dept: GENERAL RADIOLOGY | Age: 81
DRG: 166 | End: 2021-04-05
Payer: MEDICARE

## 2021-04-05 LAB
ALBUMIN SERPL-MCNC: 3 G/DL (ref 3.4–5)
ANION GAP SERPL CALCULATED.3IONS-SCNC: 11 MMOL/L (ref 3–16)
BASOPHILS ABSOLUTE: 0 K/UL (ref 0–0.2)
BASOPHILS RELATIVE PERCENT: 0 %
BUN BLDV-MCNC: 46 MG/DL (ref 7–20)
CALCIUM SERPL-MCNC: 8.2 MG/DL (ref 8.3–10.6)
CHLORIDE BLD-SCNC: 98 MMOL/L (ref 99–110)
CO2: 29 MMOL/L (ref 21–32)
CREAT SERPL-MCNC: 1.1 MG/DL (ref 0.6–1.2)
EOSINOPHILS ABSOLUTE: 0 K/UL (ref 0–0.6)
EOSINOPHILS RELATIVE PERCENT: 0 %
GFR AFRICAN AMERICAN: 57
GFR NON-AFRICAN AMERICAN: 47
GLUCOSE BLD-MCNC: 138 MG/DL (ref 70–99)
HCT VFR BLD CALC: 31 % (ref 36–48)
HEMOGLOBIN: 10.2 G/DL (ref 12–16)
L. PNEUMOPHILA SEROGP 1 UR AG: NORMAL
LYMPHOCYTES ABSOLUTE: 0.2 K/UL (ref 1–5.1)
LYMPHOCYTES RELATIVE PERCENT: 1.3 %
MCH RBC QN AUTO: 30.2 PG (ref 26–34)
MCHC RBC AUTO-ENTMCNC: 33 G/DL (ref 31–36)
MCV RBC AUTO: 91.5 FL (ref 80–100)
MONOCYTES ABSOLUTE: 0.3 K/UL (ref 0–1.3)
MONOCYTES RELATIVE PERCENT: 1.9 %
NEUTROPHILS ABSOLUTE: 14.7 K/UL (ref 1.7–7.7)
NEUTROPHILS RELATIVE PERCENT: 96.8 %
PDW BLD-RTO: 15.3 % (ref 12.4–15.4)
PHOSPHORUS: 2.7 MG/DL (ref 2.5–4.9)
PLATELET # BLD: 275 K/UL (ref 135–450)
PMV BLD AUTO: 7.9 FL (ref 5–10.5)
POTASSIUM SERPL-SCNC: 3.4 MMOL/L (ref 3.5–5.1)
RBC # BLD: 3.39 M/UL (ref 4–5.2)
SODIUM BLD-SCNC: 138 MMOL/L (ref 136–145)
WBC # BLD: 15.1 K/UL (ref 4–11)

## 2021-04-05 PROCEDURE — 74230 X-RAY XM SWLNG FUNCJ C+: CPT

## 2021-04-05 PROCEDURE — 6370000000 HC RX 637 (ALT 250 FOR IP): Performed by: NURSE PRACTITIONER

## 2021-04-05 PROCEDURE — 85025 COMPLETE CBC W/AUTO DIFF WBC: CPT

## 2021-04-05 PROCEDURE — 6370000000 HC RX 637 (ALT 250 FOR IP): Performed by: INTERNAL MEDICINE

## 2021-04-05 PROCEDURE — 80069 RENAL FUNCTION PANEL: CPT

## 2021-04-05 PROCEDURE — 94761 N-INVAS EAR/PLS OXIMETRY MLT: CPT

## 2021-04-05 PROCEDURE — 6360000002 HC RX W HCPCS: Performed by: NURSE PRACTITIONER

## 2021-04-05 PROCEDURE — 97530 THERAPEUTIC ACTIVITIES: CPT

## 2021-04-05 PROCEDURE — 2700000000 HC OXYGEN THERAPY PER DAY

## 2021-04-05 PROCEDURE — 36415 COLL VENOUS BLD VENIPUNCTURE: CPT

## 2021-04-05 PROCEDURE — 97535 SELF CARE MNGMENT TRAINING: CPT

## 2021-04-05 PROCEDURE — 92610 EVALUATE SWALLOWING FUNCTION: CPT

## 2021-04-05 PROCEDURE — 97116 GAIT TRAINING THERAPY: CPT

## 2021-04-05 PROCEDURE — 92611 MOTION FLUOROSCOPY/SWALLOW: CPT

## 2021-04-05 PROCEDURE — 1200000000 HC SEMI PRIVATE

## 2021-04-05 PROCEDURE — 94640 AIRWAY INHALATION TREATMENT: CPT

## 2021-04-05 RX ADMIN — DILTIAZEM HYDROCHLORIDE 120 MG: 120 CAPSULE, COATED, EXTENDED RELEASE ORAL at 08:49

## 2021-04-05 RX ADMIN — PROMETHAZINE HYDROCHLORIDE 12.5 MG: 25 TABLET ORAL at 08:49

## 2021-04-05 RX ADMIN — DOXYCYCLINE HYCLATE 100 MG: 100 TABLET, FILM COATED ORAL at 20:12

## 2021-04-05 RX ADMIN — METOPROLOL SUCCINATE 100 MG: 50 TABLET, EXTENDED RELEASE ORAL at 12:20

## 2021-04-05 RX ADMIN — DOXYCYCLINE HYCLATE 100 MG: 100 TABLET, FILM COATED ORAL at 08:43

## 2021-04-05 RX ADMIN — DILTIAZEM HYDROCHLORIDE 120 MG: 120 CAPSULE, COATED, EXTENDED RELEASE ORAL at 20:12

## 2021-04-05 RX ADMIN — PREDNISONE 40 MG: 20 TABLET ORAL at 08:43

## 2021-04-05 RX ADMIN — BUDESONIDE AND FORMOTEROL FUMARATE DIHYDRATE 2 PUFF: 160; 4.5 AEROSOL RESPIRATORY (INHALATION) at 20:32

## 2021-04-05 RX ADMIN — IPRATROPIUM BROMIDE AND ALBUTEROL SULFATE 1 AMPULE: .5; 3 SOLUTION RESPIRATORY (INHALATION) at 12:11

## 2021-04-05 RX ADMIN — IPRATROPIUM BROMIDE AND ALBUTEROL SULFATE 1 AMPULE: .5; 3 SOLUTION RESPIRATORY (INHALATION) at 20:29

## 2021-04-05 RX ADMIN — ENOXAPARIN SODIUM 40 MG: 40 INJECTION SUBCUTANEOUS at 08:49

## 2021-04-05 RX ADMIN — METHYLPREDNISOLONE SODIUM SUCCINATE 40 MG: 40 INJECTION, POWDER, FOR SOLUTION INTRAMUSCULAR; INTRAVENOUS at 02:48

## 2021-04-05 RX ADMIN — IPRATROPIUM BROMIDE AND ALBUTEROL SULFATE 1 AMPULE: .5; 3 SOLUTION RESPIRATORY (INHALATION) at 08:23

## 2021-04-05 RX ADMIN — ASPIRIN 81 MG: 81 TABLET, COATED ORAL at 08:43

## 2021-04-05 RX ADMIN — BUDESONIDE AND FORMOTEROL FUMARATE DIHYDRATE 2 PUFF: 160; 4.5 AEROSOL RESPIRATORY (INHALATION) at 08:23

## 2021-04-05 RX ADMIN — BUPROPION HYDROCHLORIDE 150 MG: 150 TABLET, EXTENDED RELEASE ORAL at 08:43

## 2021-04-05 ASSESSMENT — PAIN SCALES - GENERAL
PAINLEVEL_OUTOF10: 0
PAINLEVEL_OUTOF10: 0

## 2021-04-05 NOTE — PROGRESS NOTES
Hospitalist Progress Note      PCP: Javi Walton MD    Date of Admission: 4/2/2021        Subjective:   Feels terrible this am - everything hurts. Medications:  Reviewed    Infusion Medications    sodium chloride 75 mL/hr at 04/04/21 2217    sodium chloride       Scheduled Medications    sodium chloride flush  10 mL Intravenous 2 times per day    enoxaparin  40 mg Subcutaneous Daily    ipratropium-albuterol  1 ampule Inhalation Q4H WA    predniSONE  40 mg Oral Daily    aspirin  81 mg Oral Daily    buPROPion  150 mg Oral Daily    dilTIAZem  120 mg Oral BID    budesonide-formoterol  2 puff Inhalation BID    metoprolol succinate  100 mg Oral Daily    doxycycline hyclate  100 mg Oral 2 times per day     PRN Meds: sodium chloride flush, sodium chloride, promethazine **OR** ondansetron, polyethylene glycol, acetaminophen **OR** acetaminophen, albuterol, docusate sodium    No intake or output data in the 24 hours ending 04/05/21 1553    Exam:    BP (!) 141/82   Pulse 81   Temp 98.9 °F (37.2 °C) (Oral)   Resp 24   Ht 5' 7\" (1.702 m)   Wt 111 lb 5.3 oz (50.5 kg)   SpO2 93%   BMI 17.44 kg/m²     General appearance: No apparent distress, appears stated age and cooperative. HEENT: Pupils equal, round, and reactive to light. Conjunctivae/corneas clear. Neck: Supple, with full range of motion. No jugular venous distention. Trachea midline. Respiratory:  Normal respiratory effort. Clear to auscultation, bilaterally without Rales/Wheezes/Rhonchi. Cardiovascular: Regular rate and rhythm with normal S1/S2 without murmurs, rubs or gallops. Abdomen: Soft, non-tender, non-distended with normal bowel sounds. Musculoskeletal: No clubbing, cyanosis or edema bilaterally. Full range of motion without deformity. Skin: Skin color, texture, turgor normal.  No rashes or lesions. Neurologic:  Neurovascularly intact without any focal sensory/motor deficits.  Cranial nerves: II-XII intact, grossly

## 2021-04-05 NOTE — PROGRESS NOTES
Patient A&O. No complaints at this time. Call light within reach, able to make needs knows, fall precautions in place. Will continue to monitor.  Electronically signed by Ghada Bello RN on 4/5/2021 at 7:03 PM

## 2021-04-05 NOTE — PLAN OF CARE
Problem: Pain:  Goal: Pain level will decrease  Description: Pain level will decrease  Outcome: Ongoing  Note: Pt assessed for pain. Pt in pain and assessed with 0-10 pain rating scale. Pt given prescribed analgesic for pain. (See eMar) Pt satisfied with pain relief thus far. Will reassess and continue to monitor. Problem: Pain:  Goal: Control of acute pain  Description: Control of acute pain  Outcome: Ongoing  Note: Pt assessed for pain. Pt in pain and assessed with 0-10 pain rating scale. Pt given prescribed analgesic for pain. (See eMar) Pt satisfied with pain relief thus far. Will reassess and continue to monitor. Problem: Pain:  Goal: Control of chronic pain  Description: Control of chronic pain  Outcome: Ongoing     Problem: Falls - Risk of:  Goal: Will remain free from falls  Description: Will remain free from falls  Outcome: Ongoing  Note: Fall risk assessment completed. Fall precautions in place. Bed in lowest position, wheels locked, bed/chair exit alarm in place, call light within reach, and non skid footwear on. Walkway free of clutter. Pt alert and oriented and able to make needs known. Pt educated to use call light when needing to get up, and pt utilizes call light to make needs known. Will continue to monitor. Problem: Falls - Risk of:  Goal: Absence of physical injury  Description: Absence of physical injury  Outcome: Ongoing  Note: Pt is free of injury. No injury noted. Fall precautions in place. Call light within reach. Will monitor. Problem: Skin Integrity:  Goal: Will show no infection signs and symptoms  Description: Will show no infection signs and symptoms  Outcome: Ongoing  Note: Pt is free of signs and symptoms of infection. Incision and dressing are clean, dry and intact. Vital signs stable. Will monitor.         Problem: Skin Integrity:  Goal: Absence of new skin breakdown  Description: Absence of new skin breakdown  Outcome: Ongoing  Note: Pt assessed for skin

## 2021-04-05 NOTE — PROGRESS NOTES
Speech Language Pathology  Facility/Department: [facility] MED SURG   CLINICAL BEDSIDE SWALLOW EVALUATION    NAME: Gómez Olmedo  : 1940  MRN: [id number]    ADMISSION DATE: 2021  ADMITTING DIAGNOSIS: has Moderate persistent asthma without complication; Essential hypertension; Renal insufficiency; Cataract; Hypercholesterolemia LDL goal < 160; Stress incontinence; Macular degeneration; Psoriasis; Osteoporosis- DXA -3.2 (), -2.9 (), -3.3 (); Osteoarthritis; IBS (irritable bowel syndrome); Colon polyp- stop due to age; Tobacco abuse; Pulmonary nodule, right- stbale, no longer monitoring; Frequent PVCs; COPD (chronic obstructive pulmonary disease) ([address] Eczema of right eyelid; Adjustment disorder with anxious mood; Sciatica; Low back pain; Venous insufficiency of both lower extremities; Uterovaginal prolapse; Non-recurrent unilateral inguinal hernia without obstruction or gangrene; Hyperglycemia; Chronic constipation; Left inguinal hernia; Intention tremor; Compression fracture of T7 vertebra ([address] Senile osteoporosis; COPD exacerbation ([address] Acute respiratory failure with hypoxia ([address] Generalized weakness; Fatigue; and Poor appetite on their problem list.   PMHX: HTN (hypertension), Hypercholesterolemia, MAC (mycobacterium avium-intracellulare complex), Osteoarthritis, Osteopetrosis, Papanicolaou smear of cervix with atypical squamous cells of undetermined significance (ASC-US), Shingles, and Stress incontinence. ONSET DATE: 2021    Recent Chest Xray 2021  Impression   No acute disease. CHIEF COMPLAINT:  Pt c/o of shortness of breath, generalized weakness and fatigue, poor appetite. HISTORY OF PRESENT ILLNESS: Pt is an 78y.o. year-old female with a history of hypertension, hyperlipidemia and COPD.   Presents to the emergency room for evaluation following a 4 to 5-day history of increasing shortness of breath, increasing weakness and fatigue, and her family reports that she has not been eating and has been losing weight. The patient lives alone and her family normally helps her, but they have not been able to as they are quarantining with COVID. Patient does not use oxygen at home. She has called EMS several times over the past few days. Today they found her to have an oxygen saturation of 88% on room air. In the emergency room she was found to have an acute COPD exacerbation. 4/4/2021: reported difficulty swallowing eggs      Date of Eval: 4/5/2021  Evaluating Therapist: Chanelle Romero    Current Diet level:  Current Diet : Regular  Current Liquid Diet : Thin    Primary Complaint  Patient Complaint: pt denies hx dysphagia or hx esophageal impairments; reports a hard boiled egg 'got stuck' and points at/below UES. Documented recent decreased PO intake and weight loss    Pain:  Pain Assessment  Pain Assessment: 0-10  Pain Level: 0    Reason for Referral  Salome White was referred for a bedside swallow evaluation to assess the efficiency of her swallow function, identify signs and symptoms of aspiration and make recommendations regarding safe dietary consistencies, effective compensatory strategies, and safe eating environment. Impression  Dysphagia Diagnosis: Suspected needs further assessment  Dysphagia Impression : Pt alert, cooperative, appears somewhat anxious up in chair. · Suspected oropharyngeal dysphagia characterized by decreased lingual coordination, prolonged but effective mastication, suspected decreased AP transit, delayed swallow initiation, and decreased laryngeal elevation without immediate overt s/s of aspiration. · However, occasional delayed throat clear noted across all consistencies. Cannot r/o pharyngoesophageal component. · Will plan for MBS at 1130 this morning. Recommend continue current diet recommendations pending MBS.       Treatment Plan  Requires SLP Intervention: Yes  Duration/Frequency of Treatment: 3-5x/wk while on acute  D/C Recommendations: To be determined     Recommended Diet and Intervention  Diet Solids Recommendation: Regular  Liquid Consistency Recommendation: Thin  Recommended Form of Meds: PO  Recommendations: Modified barium swallow study  Therapeutic Interventions: Diet tolerance monitoring;Patient/Family education    Compensatory Swallowing Strategies  Compensatory Swallowing Strategies: Upright as possible for all oral intake;Remain upright for 30-45 minutes after meals;Small bites/sips    Treatment/Goals  Dysphagia Goals: The patient will tolerate instrumental swallowing procedure; The patient will tolerate recommended diet without observed clinical signs of aspiration    General  Chart Reviewed: Yes  Behavior/Cognition: Alert; Cooperative;Pleasant mood  Respiratory Status: O2 via nasual cannula(2)  Communication Observation: Functional  Follows Directions: Simple  Dentition: Dentures top;Dentures bottom  Patient Positioning: Upright in chair  Baseline Vocal Quality: Normal  Consistencies Administered: Dysphagia Soft and Bite-Sized (Dysphagia III); Thin;Thin - straw     Vision/Hearing  Vision  Vision: Impaired  Vision Exceptions: Wears glasses at all times; Wears glasses for reading  Hearing  Hearing: Within functional limits    Oral Motor Deficits  Oral/Motor  Oral Motor: Exceptions to [facility]  Lingual Coordination: Reduced    Oral Phase Dysfunction  Oral Phase  Oral Phase: Exceptions  Oral Phase Dysfunction  Decreased Anterior to Posterior Transit: Soft solid  Lingual/Palatal Residue: Soft solid     Indicators of Pharyngeal Phase Dysfunction   Pharyngeal Phase  Pharyngeal Phase: Exceptions  Indicators of Pharyngeal Phase Dysfunction  Delayed Swallow:  All  Decreased Laryngeal Elevation: All  Pharyngeal Phase   Pharyngeal: occasional delayed throat clear with all    Prognosis  Prognosis  Prognosis for safe diet advancement: good  Individuals consulted  Consulted and agree with results and recommendations:

## 2021-04-05 NOTE — PROGRESS NOTES
Physical Therapy  Facility/Department: [address] MED SURG  Daily Treatment Note  NAME: Prince Arnold  : 1940  MRN: [id number]    Date of Service: 2021    Discharge Recommendations:  Continue to assess pending progress, Patient would benefit from continued therapy after discharge, 3-5 sessions per week(IF pt declines SNF, will need 24hr supervision and home PT.)   PT Equipment Recommendations  Equipment Needed: Yes  Mobility Devices: Scott Kehr: Rolling  Other: IF the pt d/c to home then she will need a wheeled walker    Assessment   Assessment: Pt continues to require SBA/CGA for functional mobility. Pt limited by fatigue and chronic back pain. will continue to progress mobility as pt tolerates. Anticipate pt would benefit from continued skilled PT 3-5x/wk at d/c, IF pt declines this option pt will require 24 hr supervision and home PT level 3. Prognosis: Good  PT Education: PT Role;General Safety;Transfer Training; Adaptive Device Training;Gait Training  REQUIRES PT FOLLOW UP: Yes  Activity Tolerance  Activity Tolerance: Patient limited by pain; Patient limited by endurance; Patient limited by fatigue     Patient Diagnosis(es): The primary encounter diagnosis was COPD exacerbation ([facility] [address] Diagnoses of Hypoxia, Shortness of breath, and Fatigue, unspecified type were also pertinent to this visit. has a past medical history of HTN (hypertension), Hypercholesterolemia, MAC (mycobacterium avium-intracellulare complex), Osteoarthritis, Osteopetrosis, Papanicolaou smear of cervix with atypical squamous cells of undetermined significance (ASC-US), Shingles, and Stress incontinence. has a past surgical history that includes Cholecystectomy; Breast surgery; Cataract removal (Left, ); Inguinal hernia repair (Right, ); Spine surgery (N/A, 2020); and CT BIOPSY BONE MARROW (2021).     Restrictions  Restrictions/Precautions  Restrictions/Precautions: Fall Risk  Position Activity mobility wit flat bed with SBA. Short term goal 2: Transfer sit <> stand with SBA. Short term goal 3: Ambulate with wheeled walker 50 feet with SBA.   Patient Goals   Patient goals : to go home    Plan    Plan  Times per week: 3-5x/week  Current Treatment Recommendations: Functional Mobility Training, Transfer Training, Gait Training, ROM, Strengthening  Safety Devices  Type of devices: Call light within reach, Chair alarm in place, Gait belt, Patient at risk for falls, Left in chair, Nurse notified     Therapy Time   Individual Concurrent Group Co-treatment   Time In [address]         Time Out 0828         Minutes 38         Timed Code Treatment Minutes: Dawit 30 Bre,   [facility]

## 2021-04-05 NOTE — CARE COORDINATION
4/5 Call received from Bart Ponce regarding discharge plan. Therapy recommending SNF vs home with 24/7. Yonathan Miles feels like her Mother will say no to a SNF but she is unable to provide 24 hour care as she has a disabled daughter that she cares for. She states that she will discuss this with her Mother and return my call. The Plan for Transition of Care is related to the following treatment goals: Functional Mobility Training, Transfer Training, Gait Training, ROM, Strengthening    The Patient and/or patient representative Bart Sommer was provided with a choice of provider and agrees   with the discharge plan. [x] Yes [] No    Freedom of choice list was provided with basic dialogue that supports the patient's individualized plan of care/goals, treatment preferences and shares the quality data associated with the providers. [x] Yes [] No    Yonathan Miles will discuss with her Mother. Electronically signed by Jyoti Pinto RN on 4/5/2021 at 2:40 PM

## 2021-04-05 NOTE — PROGRESS NOTES
Occupational Therapy  Facility/Department: [facility] SURG  Daily Treatment Note  NAME: Belkis Menard  : 1940  MRN: [id number]    Date of Service: 2021     Belkis Menard scored a 17/24 on the AM-PAC ADL Inpatient form. Current research shows that an AM-PAC score of 17 or less is typically not associated with a discharge to the patient's home setting. Based on the patient's AM-PAC score and their current ADL deficits, it is recommended that the patient have 3-5 sessions per week of Occupational Therapy at d/c to increase the patient's independence. Please see assessment section for further patient specific details. If patient discharges prior to next session this note will serve as a discharge summary. Please see below for the latest assessment towards goals. Discharge Recommendations:  Continue to assess pending progress, 3-5 sessions per week      Assessment   Performance deficits / Impairments: Decreased functional mobility ; Decreased strength;Decreased ADL status; Decreased endurance;Decreased balance;Decreased high-level IADLs;Decreased safe awareness  Assessment: Pt AMPAC score of 17. Pt completed functional mobility from EOB to BR and back with CGA and RW. Pt steady gait with no overt LOB noted. Pt Min A from sit<>stand transfer from EOB to RW and toilet to RW. Pt SBA for toileting needs. Pt SPV/SBA for bed mobility. Pt continued to be limited by pain and is functioning below baseline. Low AMPAC score indicates need for continued OT services due to decrease strength and endurace and assist level. Continue with POC. Patient Diagnosis(es): The primary encounter diagnosis was COPD exacerbation ([facility] [address] Diagnoses of Hypoxia, Shortness of breath, and Fatigue, unspecified type were also pertinent to this visit.       has a past medical history of HTN (hypertension), Hypercholesterolemia, MAC (mycobacterium avium-intracellulare complex), Osteoarthritis, Osteopetrosis, Papanicolaou smear of cervix with atypical squamous cells of undetermined significance (ASC-US), Shingles, and Stress incontinence. has a past surgical history that includes Cholecystectomy; Breast surgery; Cataract removal (Left, 2009); Inguinal hernia repair (Right, 2015); Spine surgery (N/A, 11/25/2020); and CT BIOPSY BONE MARROW (2/12/2021). Restrictions  Restrictions/Precautions  Restrictions/Precautions: Fall Risk  Position Activity Restriction  Other position/activity restrictions: 2L O2  Subjective   General  Chart Reviewed: Yes  Patient assessed for rehabilitation services?: Yes  Additional Pertinent Hx: per ED note, Shawn Morgan is a 78 y.o. female with medical history of asthma, hypertension, tobacco abuse, COPD, renal insufficiency, cataracts, hypercholesterolemia, stress incontinence, macular degeneration, psoriasis, OA, IBS, pulmonary nodule right, frequent PVCs, adjustment disorder with anxious mood, sciatica, osteoporosis, colon polyp, venous insufficiency of bilateral lower extremities, uterovaginal prolapse, inguinal hernia, chronic constipation, compression fracture of T7, senile osteoporosis, shingles, cholecystectomy who presents the ED with complaints of aggressive weakness and shortness of air that is been ongoing for the past few days. Patient does live in an apartment home alone and states usually the family has been coming over to help take care of her, bathe her, cook for work, and drop-off groceries. However the daughter at bedside said they have recently been released from a Covid quarantine and the patient has had limited assistance from the family members. States that she does live home alone does not wear oxygen. EMS had been called numerous times over the past few days for patient's weakness and shortness of breath. When they arrived today her oxygen level was 88% on room air. Patient does note she has had decreased p.o. intake due to the inability to cook and prepare her food for. States that she has mostly been living off prepackaged snacks and cookies. Family is concerned as she has noticed a decreased activity and weight loss. Patient states that she is unable to effectively care for herself at home anymore. She denies any recent trauma, accidents, head injuries, or falls. She denies being anticoagulated. She does note to smoking approximately half pack per day since 2015. Patient does note that she has been tapering down and believes she last smoked approximately 1 month ago. Patient's family said this is due to because no one was bringing her cigarettes. Is unsure if patient has been compliant with her medication due to her increased fatigue and weakness. Said she gets very fatigued just even getting up off of the couch to walk over to the bathroom. Family states that she has been sleeping hunched over in the couch throughout the day. Family / Caregiver Present: No  Referring Practitioner: ALYSIA Barrera CNP  Subjective  Subjective: Pt sitting up in bed upon arrival. Pt agreeable to OOB task and reported pain in her R side. RN notified. General Comment  Comments: okay for therapy per RN. Orientation     Objective    ADL  Grooming: Setup(Washed hands and face from bed.)  LE Dressing: Maximum assistance(Max A for donning new hospital socks from bed.)  Toileting: Stand by assistance  Additional Comments: Pt SBA for toileting needs with grab bars and RW for balance. declined performing ADLs standing at sink due to fatigue and pain. Balance  Sitting Balance: Stand by assistance  Standing Balance: Contact guard assistance  Standing Balance  Time: Less than 1 minute  Activity: Functional Mob, transfers  Functional Mobility  Functional - Mobility Device: Rolling Walker  Activity: To/from bathroom  Assist Level: Contact guard assistance  Functional Mobility Comments: Pt completed functional mobility from EOB to BR and back with CGA and RW.  Pt steady gait with no overt LOB noted. Toilet Transfers  Toilet - Technique: Ambulating  Equipment Used: Grab bars  Toilet Transfer: Minimal assistance  Toilet Transfers Comments: Pt Min A from sit<>stand from toilet to RW with grab bars. Bed mobility  Supine to Sit: Supervision;Stand by assistance(HOB elevated to highest point.)  Sit to Supine: Stand by assistance  Transfers  Sit to stand: Minimal assistance(From EOB to RW)  Stand to sit: Contact guard assistance    Assessment   Performance deficits / Impairments: Decreased functional mobility ; Decreased strength;Decreased ADL status; Decreased endurance;Decreased balance;Decreased high-level IADLs;Decreased safe awareness  Assessment: Pt AMPAC score of 17. Pt completed functional mobility from EOB to BR and back with CGA and RW. Pt steady gait with no overt LOB noted. Pt Min A from sit<>stand transfer from EOB to RW and toilet to RW. Pt SBA for toileting needs. Pt SPV/SBA for bed mobility. Pt continued to be limited by pain and is functioning below baseline. Low AMPAC score indicates need for continued OT services due to decrease strength and endurace and assist level. Continue with POC. OT Education: OT Role;Plan of Care;Transfer Training  REQUIRES OT FOLLOW UP: Yes  Activity Tolerance  Activity Tolerance: Patient limited by pain; Patient limited by fatigue  Activity Tolerance: Pt rated pain in side 9/10 during movement. RN notified. Safety Devices  Safety Devices in place: Yes  Type of devices: Call light within reach;Gait belt;Nurse notified; Left in bed;Bed alarm in place         Plan   Plan  Times per week: 3-5x  Current Treatment Recommendations: Strengthening, Functional Mobility Training, Gait Training, Endurance Training, Balance Training, Self-Care / ADL, Safety Education & Training, Patient/Caregiver Education & Training, Equipment Evaluation, Education, & procurement     -PAC Inpatient Daily Activity Raw Score: 17 (04/05/21 6822)  -PAC Inpatient ADL T-Scale Score : 37.26 (04/05/21 1428)  ADL Inpatient CMS 0-100% Score: 50.11 (04/05/21 1428)  ADL Inpatient CMS G-Code Modifier : CK (04/05/21 1428)    Goals  Short term goals  Time Frame for Short term goals: prior to D/C: All goals ongoing  Short term goal 1: complete functional mobility and transfers Mod Ind  Short term goal 2: complete bathing and dressing Mod Ind  Short term goal 3: complete toileting Mod Ind  Short term goal 4: complete grooming at sink [address] term goals  Time Frame for Long term goals : STG=LTG  Patient Goals   Patient goals : return home       Therapy Time   Individual Concurrent Group Co-treatment   Time In 1350         Time Out 1430         Minutes 40                   Electronically signed by Sandhya HOWELL on 4/5/2021 at 2:46 PM     I attest that I was present for and made a skilled and mindful clinical judgement during the treatment of this patient 4/5/2021    Electronically signed by Cesar Wadsworth [id number] on 4/5/2021 at 3:11 PM

## 2021-04-05 NOTE — PROCEDURES
[facility]   Speech Therapy   MODIFIED BARIUM SWALLOW      Rufina Meng  AGE: 78 y.o. GENDER: female  : 1940  [id number]  EPISODE DATE:  2021  Ordering MD:  Ordering Physician: Dr Pedro Luis Champion  Radiologist:  Radiologist: Dr Kezia Sheikh  Date of Eval:  2021     Type of Study: Modified Barium Swallow    ONSET DATE: 2021       MEDICAL DIAGNOSIS: Dysphagia  TREATMENT DIAGNOSIS: Oropharyngeal Dysphagia    PAST MEDICAL HISTORY   has a past medical history of HTN (hypertension), Hypercholesterolemia, MAC (mycobacterium avium-intracellulare complex), Osteoarthritis, Osteopetrosis, Papanicolaou smear of cervix with atypical squamous cells of undetermined significance (ASC-US) (), Shingles, and Stress incontinence. PAST SURGICAL HISTORY  Past Surgical History:   Procedure Laterality Date    BREAST SURGERY      CATARACT REMOVAL Left     CHOLECYSTECTOMY      CT BONE MARROW BIOPSY  2021    CT BONE MARROW BIOPSY 2021 [facility]    INGUINAL HERNIA REPAIR Right     SPINE SURGERY N/A 2020    T7 KYPHOPLASTY WITH BONE BIOPSY performed by Clementina Patel MD at [facility]   Allergen Reactions    Adhesive Tape Other (See Comments)     Can cause irritation when on awhile    Codeine      GI upset    Iodine Hives    Lisinopril      Angioedema             Subjective:     Reason for referral: Gómez Olmedo was referred for a Modified Barium Swallow study to assess  the efficiency of swallow function, rule out aspiration and make recommendations regarding safe dietary consistencies, effective compensatory strategies, and safe eating environment. PATIENT AND FAMILY / STAFF COMPLAINTS:   pt denies hx dysphagia or hx esophageal impairments; reports a hard boiled egg 'got stuck' and points at/below UES.   Documented recent decreased PO intake and weight loss      Diet prior to study:   Current Diet Solid Consistency: Regular  Current Diet Liquid Consistency: Thin    Objective: Impressions and Recommendations     IMPRESSIONS:    1. Behavior/Cognition: Alert, Cooperative, Pleasant mood. Slightly anxious. 2.Dysphagia Diagnosis:  Mild oral and mild pharyngeal phase dysphagia characterized by reduced lingual manipulation, decreased mastication with solid food residue under lower plate r/t dentures, reduced bolus formation and reduced AP bolus transit with solids; delayed swallow initiation; reduced laryngeal elevation. · Solid food residue under lower dentures requiring liquid wash to partially clear; mild residue remains  · Trace barium lining of valleculae and pharyngeal wall with liquids  · NO penetration or aspiration; no residue or immediate backflow at CP  · Cough noted x1 without apparent aspiration/penetration or residue    Dysphagia Score: Dysphagia Outcome Severity Scale: Level 5: Mild dysphagia- Distant supervision. May need one diet consistency restricted    Aspiration/Penetration Risk:   Penetration-Aspiration Scale (PAS): 1 - Material does not enter the airway    Diet Recommendations:  Solid consistency: Regular   Liquid consistency: Thin   Medication administration: PO     Compensatory Swallow Strategies:   Compensatory Swallowing Strategies: Upright as possible for all oral intake, Remain upright for 30-45 minutes after meals, Small bites/sips(clean dentures after each meal)    Therapy:  Requires SLP Intervention: Yes  Duration/Frequency of Treatment: 1-2x for diet tolerance    Therapy Interventions:   Therapeutic Interventions: Diet tolerance monitoring, Patient/Family education    Goals:   Dysphagia Goals:  The patient will tolerate recommended diet without observed clinical signs of aspiration    Prognosis:  Prognosis for safe diet advancement: excellent    Education:  Consulted and agree with results and recommendations: Patient, RN  Patient Education: results, recommendations  Patient Education Response: Verbalizes understanding    D/C Recommendations: no additional ST upon discharge   Supervision: Independent    Assessment: Test Data   General  Cognitive/Behavior  General  Chart Reviewed: Yes  Respiratory Status: O2 via nasual cannula(2)  Communication Observation: Functional  Follows Directions: Simple  Current Diet : Regular  Current Liquid Diet : Thin  Dentition: Dentures top;Dentures bottom    Vision/Hearing  Vision  Vision: Impaired  Vision Exceptions: Wears glasses at all times; Wears glasses for reading  Hearing  Hearing: Within functional limits    Consistencies Assessed    Consistencies Administered: Dysphagia Soft and Bite-Sized (Dysphagia III), Reg solid, Dysphagia Minced and Moist (Dysphagia II), Dysphagia Pureed (Dysphagia I), Thin cup, Thin straw, Nectar cup    Positioning   Upright 90 degrees in chair    Indicators of Oral Phase Dysfunction   Oral Preparation / Oral Phase  Oral Phase: Impaired  Oral Phase - Major Contributing Deficits  Reduced Posterior Propulsion: Reg solid, Soft solid, Mechanical soft solid  Decreased Bolus Cohesion: Reg solid, Soft solid, Mechanical soft solid  Lingual / Palatal Residue: Reg solid, Soft solid, Mechanical soft solid    Indicators of Pharyngeal Phase Dysfunction  Pharyngeal Phase  Pharyngeal Phase: Impaired  Pharyngeal Phase: Impaired  Pharyngeal Phase - Major Contributing Deficits  Delayed Swallow Initiation: All  Reduced Laryngeal Elevation: All    Upper Esophageal Phase   Upper Esophageal Screen  Esophageal Screen: [facility]  Esophageal Screen: [facility]       MINUTES/CHARGES  Time In: 1130  SLP Individual Minutes  Time In: 1130  Time Out: 1205  Minutes: 35  Coded treatment time 5      Electronically signed by  Joseph Parks MS, CCC-SLP #[id number]  Speech Language Pathologist    on 4/5/2021 at 12:10 PM

## 2021-04-06 ENCOUNTER — APPOINTMENT (OUTPATIENT)
Dept: GENERAL RADIOLOGY | Age: 81
DRG: 166 | End: 2021-04-06
Payer: MEDICARE

## 2021-04-06 LAB
ALBUMIN SERPL-MCNC: 2.9 G/DL (ref 3.4–5)
ANION GAP SERPL CALCULATED.3IONS-SCNC: 7 MMOL/L (ref 3–16)
BASOPHILS ABSOLUTE: 0 K/UL (ref 0–0.2)
BASOPHILS RELATIVE PERCENT: 0.1 %
BUN BLDV-MCNC: 30 MG/DL (ref 7–20)
CALCIUM SERPL-MCNC: 8.7 MG/DL (ref 8.3–10.6)
CHLORIDE BLD-SCNC: 101 MMOL/L (ref 99–110)
CO2: 28 MMOL/L (ref 21–32)
CREAT SERPL-MCNC: 0.8 MG/DL (ref 0.6–1.2)
EOSINOPHILS ABSOLUTE: 0 K/UL (ref 0–0.6)
EOSINOPHILS RELATIVE PERCENT: 0 %
GFR AFRICAN AMERICAN: >60
GFR NON-AFRICAN AMERICAN: >60
GLUCOSE BLD-MCNC: 92 MG/DL (ref 70–99)
HCT VFR BLD CALC: 33.3 % (ref 36–48)
HEMOGLOBIN: 10.9 G/DL (ref 12–16)
LYMPHOCYTES ABSOLUTE: 0.3 K/UL (ref 1–5.1)
LYMPHOCYTES RELATIVE PERCENT: 2.6 %
MCH RBC QN AUTO: 30.1 PG (ref 26–34)
MCHC RBC AUTO-ENTMCNC: 32.6 G/DL (ref 31–36)
MCV RBC AUTO: 92.1 FL (ref 80–100)
MONOCYTES ABSOLUTE: 0.8 K/UL (ref 0–1.3)
MONOCYTES RELATIVE PERCENT: 6.5 %
NEUTROPHILS ABSOLUTE: 11.3 K/UL (ref 1.7–7.7)
NEUTROPHILS RELATIVE PERCENT: 90.8 %
PDW BLD-RTO: 15.1 % (ref 12.4–15.4)
PHOSPHORUS: 2.2 MG/DL (ref 2.5–4.9)
PLATELET # BLD: 284 K/UL (ref 135–450)
PMV BLD AUTO: 7.8 FL (ref 5–10.5)
POTASSIUM SERPL-SCNC: 3.7 MMOL/L (ref 3.5–5.1)
RBC # BLD: 3.61 M/UL (ref 4–5.2)
SODIUM BLD-SCNC: 136 MMOL/L (ref 136–145)
WBC # BLD: 12.4 K/UL (ref 4–11)

## 2021-04-06 PROCEDURE — 36415 COLL VENOUS BLD VENIPUNCTURE: CPT

## 2021-04-06 PROCEDURE — 6370000000 HC RX 637 (ALT 250 FOR IP): Performed by: NURSE PRACTITIONER

## 2021-04-06 PROCEDURE — 97530 THERAPEUTIC ACTIVITIES: CPT

## 2021-04-06 PROCEDURE — 94760 N-INVAS EAR/PLS OXIMETRY 1: CPT

## 2021-04-06 PROCEDURE — 6370000000 HC RX 637 (ALT 250 FOR IP): Performed by: INTERNAL MEDICINE

## 2021-04-06 PROCEDURE — 1200000000 HC SEMI PRIVATE

## 2021-04-06 PROCEDURE — 2580000003 HC RX 258: Performed by: NURSE PRACTITIONER

## 2021-04-06 PROCEDURE — 2700000000 HC OXYGEN THERAPY PER DAY

## 2021-04-06 PROCEDURE — 6360000002 HC RX W HCPCS: Performed by: NURSE PRACTITIONER

## 2021-04-06 PROCEDURE — 92526 ORAL FUNCTION THERAPY: CPT

## 2021-04-06 PROCEDURE — 94640 AIRWAY INHALATION TREATMENT: CPT

## 2021-04-06 PROCEDURE — 97535 SELF CARE MNGMENT TRAINING: CPT

## 2021-04-06 PROCEDURE — 85025 COMPLETE CBC W/AUTO DIFF WBC: CPT

## 2021-04-06 PROCEDURE — 74220 X-RAY XM ESOPHAGUS 1CNTRST: CPT

## 2021-04-06 PROCEDURE — 80069 RENAL FUNCTION PANEL: CPT

## 2021-04-06 RX ORDER — PREDNISONE 20 MG/1
40 TABLET ORAL DAILY
Status: COMPLETED | OUTPATIENT
Start: 2021-04-06 | End: 2021-04-07

## 2021-04-06 RX ADMIN — ACETAMINOPHEN 650 MG: 325 TABLET ORAL at 22:36

## 2021-04-06 RX ADMIN — IPRATROPIUM BROMIDE AND ALBUTEROL SULFATE 1 AMPULE: .5; 3 SOLUTION RESPIRATORY (INHALATION) at 08:15

## 2021-04-06 RX ADMIN — ACETAMINOPHEN 650 MG: 325 TABLET ORAL at 10:17

## 2021-04-06 RX ADMIN — ASPIRIN 81 MG: 81 TABLET, COATED ORAL at 10:15

## 2021-04-06 RX ADMIN — DOXYCYCLINE HYCLATE 100 MG: 100 TABLET, FILM COATED ORAL at 10:15

## 2021-04-06 RX ADMIN — IPRATROPIUM BROMIDE AND ALBUTEROL SULFATE 1 AMPULE: .5; 3 SOLUTION RESPIRATORY (INHALATION) at 20:15

## 2021-04-06 RX ADMIN — BUDESONIDE AND FORMOTEROL FUMARATE DIHYDRATE 2 PUFF: 160; 4.5 AEROSOL RESPIRATORY (INHALATION) at 08:16

## 2021-04-06 RX ADMIN — DOXYCYCLINE HYCLATE 100 MG: 100 TABLET, FILM COATED ORAL at 22:35

## 2021-04-06 RX ADMIN — DILTIAZEM HYDROCHLORIDE 120 MG: 120 CAPSULE, COATED, EXTENDED RELEASE ORAL at 22:35

## 2021-04-06 RX ADMIN — BUDESONIDE AND FORMOTEROL FUMARATE DIHYDRATE 2 PUFF: 160; 4.5 AEROSOL RESPIRATORY (INHALATION) at 20:15

## 2021-04-06 RX ADMIN — IPRATROPIUM BROMIDE AND ALBUTEROL SULFATE 1 AMPULE: .5; 3 SOLUTION RESPIRATORY (INHALATION) at 15:37

## 2021-04-06 RX ADMIN — METOPROLOL SUCCINATE 100 MG: 50 TABLET, EXTENDED RELEASE ORAL at 10:14

## 2021-04-06 RX ADMIN — DILTIAZEM HYDROCHLORIDE 120 MG: 120 CAPSULE, COATED, EXTENDED RELEASE ORAL at 10:14

## 2021-04-06 RX ADMIN — ENOXAPARIN SODIUM 40 MG: 40 INJECTION SUBCUTANEOUS at 10:19

## 2021-04-06 RX ADMIN — BUPROPION HYDROCHLORIDE 150 MG: 150 TABLET, EXTENDED RELEASE ORAL at 10:15

## 2021-04-06 RX ADMIN — PREDNISONE 40 MG: 20 TABLET ORAL at 10:15

## 2021-04-06 RX ADMIN — IPRATROPIUM BROMIDE AND ALBUTEROL SULFATE 1 AMPULE: .5; 3 SOLUTION RESPIRATORY (INHALATION) at 11:55

## 2021-04-06 RX ADMIN — Medication 10 ML: at 22:40

## 2021-04-06 ASSESSMENT — PAIN SCALES - GENERAL
PAINLEVEL_OUTOF10: 0
PAINLEVEL_OUTOF10: 3

## 2021-04-06 ASSESSMENT — PAIN - FUNCTIONAL ASSESSMENT: PAIN_FUNCTIONAL_ASSESSMENT: ACTIVITIES ARE NOT PREVENTED

## 2021-04-06 ASSESSMENT — PAIN DESCRIPTION - PROGRESSION: CLINICAL_PROGRESSION: NOT CHANGED

## 2021-04-06 ASSESSMENT — PAIN DESCRIPTION - ORIENTATION: ORIENTATION: MID

## 2021-04-06 ASSESSMENT — PAIN DESCRIPTION - ONSET: ONSET: GRADUAL

## 2021-04-06 ASSESSMENT — PAIN DESCRIPTION - FREQUENCY: FREQUENCY: INTERMITTENT

## 2021-04-06 ASSESSMENT — PAIN DESCRIPTION - PAIN TYPE: TYPE: ACUTE PAIN

## 2021-04-06 NOTE — DISCHARGE INSTR - COC
Continuity of Care Form    Patient Name: Amol Johns   :  1940  MRN:  [id number]    Admit date:  2021  Discharge date:  4/15/2021  Code Status Order: Full Code   Advance Directives:   Advance Care Flowsheet Documentation       Date/Time Healthcare Directive Type of Healthcare Directive Copy in [address] Agent's Name Healthcare Agent's Phone Number    [phone]  Yes, patient has an advance directive for healthcare treatment  Living will  No, copy requested from family  Adult Maricruz Palencia  --            Admitting Physician:  Ky Donnelly MD  PCP: Coel Cole MD    Discharging Nurse: IVAN [facility] Unit/Room#: R8S-0368/7738-10  Discharging Unit Phone Number: [phone]    Emergency Contact:   Extended Emergency Contact Information  Primary Emergency Contact: Rosario Kumar  Address: [address] [place] [address] Phone: [phone]  Mobile Phone: [phone]  Relation: Child    Past Surgical History:  Past Surgical History:   Procedure Laterality Date    BREAST SURGERY      CATARACT REMOVAL Left     CHOLECYSTECTOMY      CT BONE MARROW BIOPSY  2021    CT BONE MARROW BIOPSY 2021 [facility]    INGUINAL HERNIA REPAIR Right     SPINE SURGERY N/A 2020    T7 KYPHOPLASTY WITH BONE BIOPSY performed by Garo Medel MD at [place] [address]       Immunization History:   Immunization History   Administered Date(s) Administered    Influenza Virus Vaccine 12/10/2012    Influenza Whole 2009, 10/25/2010    Influenza, High Dose (Fluzone 65 yrs and older) 2012, 2014, 10/19/2015, 2016, 10/16/2017, 2018    Influenza, Quadv, adjuvanted, 65 yrs +, IM, PF (Fluad) 10/22/2020    Influenza, Triv, inactivated, subunit, adjuvanted, IM (Fluad 65 yrs and older) 10/29/2019    Pneumococcal Conjugate 13-valent ([id number]) 2015    Pneumococcal Polysaccharide ([id number]) 02/18/2010    Tdap (Boostrix, Adacel) 07/23/2003    Zoster Live (Zostavax) 07/21/2011       Active Problems:  Patient Active Problem List   Diagnosis Code    Moderate persistent asthma without complication I99.13    Essential hypertension I10    Renal insufficiency N28.9    Cataract H26.9    Hypercholesterolemia LDL goal < 160 E78.00    Stress incontinence N39.3    Macular degeneration H35.30    Psoriasis L40.9    Osteoporosis- DXA -3.2 (11/14), -2.9 (8/11), -3.3 (9/08) M81.0    Osteoarthritis M19.90    IBS (irritable bowel syndrome) K58.9    Colon polyp- stop due to age K58.7    Tobacco abuse Z72.0    Pulmonary nodule, right- stbale, no longer monitoring R91.1    Frequent PVCs I49.3    COPD (chronic obstructive pulmonary disease) ([facility]) J44.9    Eczema of right eyelid H01.133    Adjustment disorder with anxious mood F43.22    Sciatica M54.30    Low back pain M54.5    Venous insufficiency of both lower extremities I87.2    Uterovaginal prolapse N81.4    Non-recurrent unilateral inguinal hernia without obstruction or gangrene K40.90    Hyperglycemia R73.9    Chronic constipation K59.09    Left inguinal hernia K40.90    Intention tremor G25.2    Compression fracture of T7 vertebra ([facility]) S22.060A    Senile osteoporosis M81.0    COPD exacerbation ([facility]) J44.1    Acute respiratory failure with hypoxia ([facility]) J96.01    Generalized weakness R53.1    Fatigue R53.83    Poor appetite R63.0       Isolation/Infection:   Isolation            No Isolation          Patient Infection Status       Infection Onset Added Last Indicated Last Indicated By Review Planned Expiration Resolved Resolved By    None active    Resolved    COVID-19 Rule Out 04/02/21 04/02/21 04/02/21 COVID-19, Rapid (Ordered)   04/02/21 Rule-Out Test Resulted            Nurse Assessment:  Last Vital Signs: /68   Pulse 66   Temp 97.4 °F (36.3 °C) (Oral)   Resp 18   Ht 5' 7\" (1.702 m)   Wt 117 lb 15.1 oz (53.5 kg)   SpO2 97%   BMI 18.47 kg/m²     Last documented pain score (0-10 scale): Pain Level: 0  Last Weight:   Wt Readings from Last 1 Encounters:   04/06/21 117 lb 15.1 oz (53.5 kg)     Mental Status:  oriented, alert, coherent, logical, thought processes intact and able to concentrate and follow conversation    IV Access:  - None    Nursing Mobility/ADLs:  Walking   Dependent - kalia peterdy  Transfer  Assisted  Bathing  Assisted  Dressing  Assisted  Toileting  Assisted  Feeding  Independent  Med [address]  Assisted  Med Delivery   whole    Wound Care Documentation and Therapy:  Puncture 02/12/21 Back Lower;Medial;Right (Active)   Number of days: 53        Elimination:  Continence:   · Bowel: No  · Bladder: No  Urinary Catheter: None   Colostomy/Ileostomy/Ileal Conduit: No       Date of Last BM: 4/15/2021    Intake/Output Summary (Last 24 hours) at 4/6/2021 1655  Last data filed at 4/6/2021 1458  Gross per 24 hour   Intake 1393 ml   Output 100 ml   Net 1293 ml     I/O last 3 completed shifts: In: 8673 [P.O.:480; I.V.:913]  Out: 100 [Urine:100]    Safety Concerns:     History of Falls (last 30 days) and At Risk for Falls    Impairments/Disabilities:      None    Nutrition Therapy:  Current Nutrition Therapy:   - Oral Diet:  General    Routes of Feeding: Oral  Liquids: Thin Liquids  Daily Fluid Restriction: no  Last Modified Barium Swallow with Video (Video Swallowing Test): not done    Treatments at the Time of Hospital Discharge:   Respiratory Treatments: See Below  Oxygen Therapy:  is on oxygen at 3 L/min per nasal cannula.   Ventilator:    - No ventilator support    Rehab Therapies: Physical Therapy and Occupational Therapy  Weight Bearing Status/Restrictions: No weight bearing restirctions (as tolerated)  Other Medical Equipment (for information only, NOT a DME order):  walker, bath bench, bedside [facility]  Other Treatments:     Patient's personal belongings (please select all that are sent with

## 2021-04-06 NOTE — PROGRESS NOTES
Patient is alert & oriented x4, x1 assist with walker, 2/4 bed rails up, bed in lowest position, fall precautions in place, call light within reach. Morning medications given without complications. Will cont to monitor and reassess.  Electronically signed by Johnny Mai RN on 4/6/2021 at 11:49 AM

## 2021-04-06 NOTE — PLAN OF CARE
Problem: Pain:  Goal: Pain level will decrease  Description: Pain level will decrease  4/6/2021 0028 by Laureano Mendes RN  Outcome: Ongoing     Problem: Pain:  Goal: Control of acute pain  Description: Control of acute pain  4/6/2021 0028 by Laureano Mendes RN  Outcome: Ongoing     Problem: Pain:  Goal: Control of chronic pain  Description: Control of chronic pain  4/6/2021 0028 by Laureano Mendes RN  Outcome: Ongoing     Problem: Falls - Risk of:  Goal: Will remain free from falls  Description: Will remain free from falls  4/6/2021 0028 by Laureano Mendes RN  Outcome: Ongoing     Problem: Falls - Risk of:  Goal: Absence of physical injury  Description: Absence of physical injury  4/6/2021 0028 by Laureano Mendes RN  Outcome: Ongoing     Problem: Skin Integrity:  Goal: Will show no infection signs and symptoms  Description: Will show no infection signs and symptoms  4/6/2021 0028 by Laureano Mendes RN  Outcome: Ongoing     Problem: Skin Integrity:  Goal: Absence of new skin breakdown  Description: Absence of new skin breakdown  4/6/2021 0028 by Laureano Mendes RN  Outcome: Ongoing     Problem: Breathing Pattern - Ineffective:  Goal: Ability to achieve and maintain a regular respiratory rate will improve  Description: Ability to achieve and maintain a regular respiratory rate will improve  4/6/2021 0028 by Laureano Mendes RN  Outcome: Ongoing     Problem: Infection:  Goal: Will remain free from infection  Description: Will remain free from infection  4/6/2021 0028 by Laureano Mendes RN  Outcome: Ongoing     Problem: Safety:  Goal: Free from accidental physical injury  Description: Free from accidental physical injury  4/6/2021 0028 by Laureano Mendes RN  Outcome: Ongoing     Problem: Safety:  Goal: Free from intentional harm  Description: Free from intentional harm  4/6/2021 0028 by Laureano Mendes RN  Outcome: Ongoing     Problem: Daily Care:  Goal: Daily care needs are met  Description: Daily care needs are met  4/6/2021 0028 by Araceli Brunson RN  Outcome: Ongoing     Problem: Discharge Planning:  Goal: Patients continuum of care needs are met  Description: Patients continuum of care needs are met  4/6/2021 0028 by Araceli Brunson RN  Outcome: Ongoing     Problem: Nutrition  Goal: Optimal nutrition therapy  4/6/2021 0028 by Araceli Brunson RN  Outcome: Ongoing

## 2021-04-06 NOTE — PROGRESS NOTES
decreased p.o. intake due to the inability to cook and prepare her food for. States that she has mostly been living off prepackaged snacks and cookies. Family is concerned as she has noticed a decreased activity and weight loss. Patient states that she is unable to effectively care for herself at home anymore. She denies any recent trauma, accidents, head injuries, or falls. She denies being anticoagulated. She does note to smoking approximately half pack per day since 2015. Patient does note that she has been tapering down and believes she last smoked approximately 1 month ago. Patient's family said this is due to because no one was bringing her cigarettes. Is unsure if patient has been compliant with her medication due to her increased fatigue and weakness. Said she gets very fatigued just even getting up off of the couch to walk over to the bathroom. Family states that she has been sleeping hunched over in the couch throughout the day. Family / Caregiver Present: No  Referring Practitioner: ALYSIA Chacon CNP  Subjective  Subjective: Pt supine in bed upon arrival. Pt agreeable to OOB task and still reports pain in her R side. RN notified. General Comment  Comments: okay for therapy per RN. Orientation     Objective    ADL  Grooming: Minimal assistance(Min A to brush hair from EOB)  LE Dressing: Minimal assistance; Moderate assistance(Min/Mod A for donning brief from toilet.)  Toileting: Stand by assistance(SBA for toileting and pericare.)  Additional Comments: Pt Min/Mod A for donning new brief from toilet seat. SBA for toileting needs and pericare. Min A for brushing hair from EOB.         Balance  Sitting Balance: Stand by assistance  Standing Balance: Contact guard assistance  Standing Balance  Time: Less than 1 minute  Activity: Functional Mob, transfers  Functional Mobility  Functional - Mobility Device: Rolling Walker  Activity: To/from bathroom  Assist Level: Contact guard assistance  Functional Mobility Comments: Pt competed functional mobility from EOB to BR and back with CGA and RW with cues for walker safety and hand placement. Pt steady gait with no LOB noted. Toilet Transfers  Toilet - Technique: Ambulating  Equipment Used: Grab bars  Toilet Transfer: Minimal assistance; Moderate assistance  Toilet Transfers Comments: Pt Min/Mod A for sit<>stand from toilet seat to RW with grab bars  Bed mobility  Supine to Sit: Contact guard assistance;Stand by assistance(HOB elevated)  Sit to Supine: Stand by assistance;Contact guard assistance  Transfers  Sit to stand: Minimal assistance  Stand to sit: Contact guard assistance  Transfer Comments: Pt Min A from EOB to RW with cues for hand placements and CGA for stand<>sit back to bed. Cognition  Overall Cognitive Status: Exceptions  Arousal/Alertness: Appropriate responses to stimuli  Following Commands: Follows all commands without difficulty  Attention Span: Appears intact  Memory: Appears intact  Safety Judgement: Decreased awareness of need for assistance  Insights: Decreased awareness of deficits  Initiation: Does not require cues  Sequencing: Does not require cues     Assessment   Performance deficits / Impairments: Decreased functional mobility ; Decreased strength;Decreased ADL status; Decreased endurance;Decreased balance;Decreased high-level IADLs;Decreased safe awareness  Assessment: Pt AMPAC score of 17 and continues to be limited by the above deficits. Pt completed functional mobility to/from BR with CGA and RW with cues for hand placements and safety. Pt Min A for sit<>stand from EOB and Min/Mod A from toilet to RW. Pt Min/Mod A for donning new brief from toilet seat. SBA for toileting needs and pericare. AMPAC score indicates need for continued skilled OT services to increase safety awareness, endurance, and independence with ADLs. Continue with POC.   OT Education: OT Role;Plan of Care;Transfer Training;Energy Conservation  REQUIRES OT FOLLOW UP: Yes  Activity Tolerance  Activity Tolerance: Patient limited by pain; Patient limited by fatigue  Safety Devices  Safety Devices in place: Yes  Type of devices: Call light within reach;Gait belt;Nurse notified; Left in bed;Bed alarm in place        Plan   Plan  Times per week: 3-5x  Current Treatment Recommendations: Strengthening, Functional Mobility Training, Gait Training, Endurance Training, Balance Training, Self-Care / ADL, Safety Education & Training, Patient/Caregiver Education & Training, Equipment Evaluation, Education, & procurement        AM-PAC Inpatient Daily Activity Raw Score: 17 (04/06/21 1517)  AM-PAC Inpatient ADL T-Scale Score : 37.26 (04/06/21 1517)  ADL Inpatient CMS 0-100% Score: 50.11 (04/06/21 1517)  ADL Inpatient CMS G-Code Modifier : CK (04/06/21 1517)    Goals  Short term goals  Time Frame for Short term goals: prior to D/C: All goals ongoing  Short term goal 1: complete functional mobility and transfers Mod Ind  Short term goal 2: complete bathing and dressing Mod Ind  Short term goal 3: complete toileting Mod Ind  Short term goal 4: complete grooming at sink [address] term goals  Time Frame for Long term goals : STG=LTG  Patient Goals   Patient goals : return home       Therapy Time   Individual Concurrent Group Co-treatment   Time In 9088         Time Out 1515         Minutes 40                   Electronically signed by Sha HOWELL on 4/6/2021 at 3:34 PM     Electronically signed by Angie Farrar, [id number] on 4/6/2021 at 3:35 PM    I attest that I was present for and made a skilled and mindful clinical judgement during the treatment of this patient 4/6/2021

## 2021-04-06 NOTE — PROGRESS NOTES
Patient A&O. No complaints at this time. Call light within reach, able to make needs knows, fall precautions in place. Will continue to monitor.  Electronically signed by Elkin Mcneill RN on 4/6/2021 at 6:04 PM

## 2021-04-06 NOTE — CARE COORDINATION
Info sent to [facility] per daughter request.  Pt will need [facility] pre cert prior to dc.     Electronically signed by MAGGI Glover on 4/6/2021 at 11:29 AM

## 2021-04-06 NOTE — CARE COORDINATION
Received call that [facility] can admit pt 4/7 and pre cert has been approved. Informed pts daughter of above and she is planning for [facility] [place]. Pt will need ambulance transport at [place]. Hens is completed.    Electronically signed by MAGGI Fay on 4/6/2021 at 4:58 PM

## 2021-04-06 NOTE — PROGRESS NOTES
Speech Language Pathology  [facility]   Speech Therapy  Daily Dysphagia Treatment Note        Cheri Sahu  AGE: 78 y.o. GENDER: female  : 1940  [id number]  EPISODE DATE:  2021  Admit Diagnosis: The primary encounter diagnosis was COPD exacerbation ([address] Diagnoses of Hypoxia, Shortness of breath, and Fatigue, unspecified type were also pertinent to this visit. · has Moderate persistent asthma without complication; Essential hypertension; Renal insufficiency; Cataract; Hypercholesterolemia LDL goal < 160; Stress incontinence; Macular degeneration; Psoriasis; Osteoporosis- DXA -3.2 (), -2.9 (), -3.3 (); Osteoarthritis; IBS (irritable bowel syndrome); Colon polyp- stop due to age; Tobacco abuse; Pulmonary nodule, right- stbale, no longer monitoring; Frequent PVCs; COPD (chronic obstructive pulmonary disease) ([address] Eczema of right eyelid; Adjustment disorder with anxious mood; Sciatica; Low back pain; Venous insufficiency of both lower extremities; Uterovaginal prolapse; Non-recurrent unilateral inguinal hernia without obstruction or gangrene; Hyperglycemia; Chronic constipation; Left inguinal hernia; Intention tremor; Compression fracture of T7 vertebra ([address] Senile osteoporosis; COPD exacerbation ([address] Acute respiratory failure with hypoxia ([address] Generalized weakness; Fatigue; and Poor appetite on their problem list.   · PMHX: HTN (hypertension), Hypercholesterolemia, MAC (mycobacterium avium-intracellulare complex), Osteoarthritis, Osteopetrosis, Papanicolaou smear of cervix with atypical squamous cells  · Allergies: adhesive tape; codeine; Iodine; Lisinopril  onset: 2021    Treatment Diagnosis: Dysphagia       Chart review:   · Per MD documentation:   CHIEF COMPLAINT:  Pt c/o of shortness of breath, generalized weakness and fatigue, poor appetite. HISTORY OF PRESENT ILLNESS: Pt is an 78 y. o. year-old female with a history of hypertension, hyperlipidemia and COPD.  Presents to the emergency room for evaluation following a 4 to 5-day history of increasing shortness of breath, increasing weakness and fatigue, and her family reports that she has not been eating and has been losing weight.  The patient lives alone and her family normally helps her, but they have not been able to as they are quarantining with Natty Fernandez does not use oxygen at home. Andrea Heller has called EMS several times over the past few days.  Today they found her to have an oxygen saturation of 88% on room air.  In the emergency room she was found to have an acute COPD exacerbation. 4/4/2021: reported difficulty swallowing eggs    4/5/2021 Clinical Evaluation of Swallowing completed with recommendations for MBSS to furthr assess pharyngeal phase of the swallow  4/5/2021 MBSS completed with recommendations for regular diet with thin liquids   4/6/2021 Esophagram :   Impression   Esophageal dysmotility with tertiary contractions and spasms.       Small hiatal hernia with focal persistent defect compatible with what appears   to be a Schatzki's ring.  Evaluation is limited due to patient's ability to   position for exam.           Subjective:     Current diet   Regular food consistency diet  Thin liquids     Comments regarding tolerating Current Diet:   · Pt reports \"things are going okay; I just have to be careful due to the reflux\"    Objective:     Pain   Patient Currently in Pain:\" I have bad back pain which is chronic\"    Cognitive/Behavior   Pt was sitting on the side of the bed eating lunch. Pt was oriented to self; place and partially to date/situation and d/c planning . Pt was verbally responsive and able to follow one step commands.   4L/min O2 via nasal canula    Positioning    Upright on the side of the bed    PO Trials:  · Thin Liquids: No overt clinical s/s of aspiration and no voice quality changes post swallow  · Puree : No oral pocketing and No overt clinical s/s of aspiration and no voice quality changes post swallow  · Soft food:  prolonged mastication and variable rate of A-P oral transit; No oral pocketing and No overt clinical s/s of aspiration and no voice quality changes post swallow  · Regular food: pt declined this date stating I already ate that and am full    Dysphagia Tx:   Direct Therapy po presentations   Ongoing pt ed in compensatory swallow strategies stressing lifestyle tips for GERD    Goals:   Dysphagia Goals: The patient will tolerate recommended diet without observed clinical signs of aspiration: ongoing    Assessment:   Impressions:   4/5/2021 MBSS Dysphagia Diagnosis : Mild oral and mild pharyngeal phase dysphagia characterized by reduced lingual manipulation, decreased mastication with solid food residue under lower plate r/t dentures, reduced bolus formation and reduced AP bolus transit with solids; delayed swallow initiation; reduced laryngeal elevation. Solid food residue under lower dentures requiring liquid wash to partially clear; mild residue remains. Trace barium lining of valleculae and pharyngeal wall with liquids. NO penetration or aspiration; no residue or immediate backflow at CP. Cough noted x1 without apparent aspiration/penetration or residue    Direct Dysphagia treatment impressions 4/6/2021  · Pt was sitting on the side of the bed eating lunch. Pt was oriented to self; place and partially to date/situation and d/c planning . Pt was verbally responsive and able to follow one step commands. Pt was oriented to recent esophagram today but not to findings. · 4L/min O2 via nasal canula  · Pt tolerated all po presentations of thin liquids puree and soft/bite size without oral pocketing; without overt clinical s/s of aspiration and without voice quality changes post swallow  · Pt does report concern about the reflux problems she is having. Pt was receptive to general ed in lifestyle tips for GERD.    Continue diet as ordered unless otherwise recommended by MD. Continue SLP f/u for diet tolerance and ongoing pt ed. Diet Recommendations:  Solid consistency: Regular   Liquid consistency: Thin   Medication administration: PO     Strategies:   Compensatory Swallowing Strategies: Upright as possible for all oral intake, Remain upright for 30-45 minutes after meals, Small bites/sips(clean dentures after each meal); GERD precautions    Education:  Consulted and agree with results and recommendations: Patient, RN  Patient Education: results, recommendations  Patient Education Response: Verbalizes understanding    Prognosis:   Good guarded pending medical co-morbidities    Plan:     Continue Dysphagia Therapy:   Interventions: Therapeutic Interventions: Diet tolerance monitoring, Patient/Family education  Duration/Frequency of therapy while on unit: Duration/Frequency of Treatment  Duration/Frequency of Treatment: 1-2x for diet tolerance  Discharge Instructions:   Do not anticipate need for further skilled Speech Therapy for Dysphagia at discharge;unless status changes    This note serves as a D/C Summary in the event that this patient is discharged prior to the next therapy session. Coded treatment time: 0  Total treatment time: 15    Electronically signed by  Sahil Sorto. CurtisMS,CCC,SLP [id number]  Speech and Language Pathologist  on 4/6/2021 at 12:51 PM

## 2021-04-06 NOTE — CARE COORDINATION
[facility] and they will start pre cert with [facility]. Any questions, [facility] admissions  [phone] will cover on 4/7/2021.   Electronically signed by MAGGI Aj on 4/6/2021 at 2:59 PM

## 2021-04-06 NOTE — PLAN OF CARE
Problem: Pain:  Goal: Pain level will decrease  Description: Pain level will decrease  4/6/2021 1153 by Andreas Schroeder RN  Outcome: Ongoing  Note: Pt assessed for pain. Pt in pain and assessed with 0-10 pain rating scale. Pt given prescribed analgesic for pain. (See eMar) Pt satisfied with pain relief thus far. Will reassess and continue to monitor. Problem: Pain:  Goal: Control of acute pain  Description: Control of acute pain  4/6/2021 1153 by Andreas Schroeder RN  Outcome: Ongoing  Note: Pt assessed for pain. Pt in pain and assessed with 0-10 pain rating scale. Pt given prescribed analgesic for pain. (See eMar) Pt satisfied with pain relief thus far. Will reassess and continue to monitor. Problem: Pain:  Goal: Control of chronic pain  Description: Control of chronic pain  4/6/2021 1153 by Andreas Schroeder RN  Outcome: Ongoing  Note: Pt assessed for pain. Pt in pain and assessed with 0-10 pain rating scale. Pt given prescribed analgesic for pain. (See eMar) Pt satisfied with pain relief thus far. Will reassess and continue to monitor. Problem: Pain:  Goal: Control of chronic pain  Description: Control of chronic pain  4/6/2021 1153 by Andreas Schroeder RN  Outcome: Ongoing  Note: Pt assessed for pain. Pt in pain and assessed with 0-10 pain rating scale. Pt given prescribed analgesic for pain. (See eMar) Pt satisfied with pain relief thus far. Will reassess and continue to monitor. Problem: Falls - Risk of:  Goal: Will remain free from falls  Description: Will remain free from falls  4/6/2021 1153 by Andreas Schroeder RN  Outcome: Ongoing  Note: Fall risk assessment completed. Fall precautions in place. Bed in lowest position, wheels locked, bed/chair exit alarm in place, call light within reach, and non skid footwear on. Walkway free of clutter. Pt alert and oriented and able to make needs known.  Pt educated to use call light when needing to get up, and pt utilizes call light to light within reach. Will monitor. Problem: Safety:  Goal: Free from intentional harm  Description: Free from intentional harm  4/6/2021 1153 by Ellis Boston RN  Outcome: Ongoing  Note: Pt is free of intentional harm. No intentions noted. Will monitor. Problem: Daily Care:  Goal: Daily care needs are met  Description: Daily care needs are met  4/6/2021 1153 by Ellis Boston RN  Outcome: Ongoing  Note: Daily care needs are being met. Problem: Discharge Planning:  Goal: Patients continuum of care needs are met  Description: Patients continuum of care needs are met  4/6/2021 1153 by Ellis Boston RN  Outcome: Ongoing  Note: Patients continuum of care needs are met. Problem: Nutrition  Goal: Optimal nutrition therapy  4/6/2021 1153 by Ellis Boston RN  Outcome: Ongoing  Note: Patient's nutrition is improving, patient had % of breakfast. Will cont to monitor and reassess.

## 2021-04-06 NOTE — PROGRESS NOTES
Hospitalist Progress Note      PCP: Efrain Gomez MD    Date of Admission: 4/2/2021        Subjective:       Pt has ongoing complaint about dysphagia symptoms, albeit she feels hungry, but having difficulty with solid food. Her MBS was normal.     I discussed next steps in evaluation - she agreed to manometry study. Medications:  Reviewed    Infusion Medications    sodium chloride       Scheduled Medications    predniSONE  40 mg Oral Daily    sodium chloride flush  10 mL Intravenous 2 times per day    enoxaparin  40 mg Subcutaneous Daily    ipratropium-albuterol  1 ampule Inhalation Q4H WA    aspirin  81 mg Oral Daily    buPROPion  150 mg Oral Daily    dilTIAZem  120 mg Oral BID    budesonide-formoterol  2 puff Inhalation BID    metoprolol succinate  100 mg Oral Daily    doxycycline hyclate  100 mg Oral 2 times per day     PRN Meds: sodium chloride flush, sodium chloride, promethazine **OR** ondansetron, polyethylene glycol, acetaminophen **OR** acetaminophen, albuterol, docusate sodium      Intake/Output Summary (Last 24 hours) at 4/6/2021 1642  Last data filed at 4/6/2021 1458  Gross per 24 hour   Intake 1393 ml   Output 100 ml   Net 1293 ml       Exam:    /68   Pulse 66   Temp 97.4 °F (36.3 °C) (Oral)   Resp 18   Ht 5' 7\" (1.702 m)   Wt 117 lb 15.1 oz (53.5 kg)   SpO2 97%   BMI 18.47 kg/m²     General appearance: No apparent distress, appears stated age and cooperative. HEENT: Pupils equal, round, and reactive to light. Conjunctivae/corneas clear. Neck: Supple, with full range of motion. No jugular venous distention. Trachea midline. Respiratory:  Normal respiratory effort. Clear to auscultation, bilaterally without Rales/Wheezes/Rhonchi. Cardiovascular: Regular rate and rhythm with normal S1/S2 without murmurs, rubs or gallops. Abdomen: Soft, non-tender, non-distended with normal bowel sounds. Musculoskeletal: No clubbing, cyanosis or edema bilaterally.   Full range of motion without deformity. Skin: Skin color, texture, turgor normal.  No rashes or lesions. Neurologic:  Neurovascularly intact without any focal sensory/motor deficits. Cranial nerves: II-XII intact, grossly non-focal.  Psychiatric: Alert and oriented, thought content appropriate, normal insight  Capillary Refill: Brisk,< 3 seconds   Peripheral Pulses: +2 palpable, equal bilaterally       Labs:   Recent Labs     04/04/21  0536 04/05/21  0549 04/06/21  0824   WBC 14.3* 15.1* 12.4*   HGB 10.1* 10.2* 10.9*   HCT 31.2* 31.0* 33.3*    275 284     Recent Labs     04/04/21  0536 04/05/21  0549 04/06/21  0824    138 136   K 3.3* 3.4* 3.7   CL 94* 98* 101   CO2 32 29 28   BUN 56* 46* 30*   CREATININE 1.6* 1.1 0.8   CALCIUM 8.6 8.2* 8.7   PHOS  --  2.7 2.2*     No results for input(s): AST, ALT, BILIDIR, BILITOT, ALKPHOS in the last 72 hours. No results for input(s): INR in the last 72 hours. No results for input(s): Ethelene Soulier in the last 72 hours. Assessment/Plan:    -COPD exacerbation. . Continue bronchodilators, Solu-Medrol and doxycycline. .chest x-ray is clear.      -Acute respiratory failure with hypoxia due to COPD exacerbation. Requiring up to 3 L nasal cannula, wean down to 1 L nasal cannula. continue to wean oxygen as tolerated. . home 02 eval on dc      Dysphagia. MBS is normal - no aspiration. Pt agreed to have esophagram done. - 3/6 - this shows esophageal dysmotility with spasms, possibly Schatzki Ring.    - I will ask GI to evaluate. - she is already on CCB.     - she will likely need EGD to investigate/treat this further.    - she is struggling with solid food, though wants to eat and feels hungry.       -Reactive leukocytosis due to COPD and steroids--continue to monitor    -Generalized weakness due to COPD exacerbation--PT and OT was consulted-disposition to SNF versus home health--patient prefers home health    -Essential hypertension stable-continue diltiazem, metoprolol. Held Lasix and HCTZ due to RASHAWN    -Hyperlipidemia-not on statin therapy-follow-up with PCP    -Hypokalemia-continue to replete potassium    -RASHAWN--creatinine up to 1.6 from baseline of 1.0.. Likely prerenal related to relative hypotension, NSAIDs and diuretic use. .. Stopped diuretics and NSAIDs. . Started IV fluids. . Avoid hypotension    DVT Prophylaxis: Lovenox  Diet: DIET GENERAL;  Dietary Nutrition Supplements: Standard Oral Supplement  Code Status: Full Code        Cece Marks MD

## 2021-04-07 PROBLEM — E44.0 MODERATE MALNUTRITION (HCC): Chronic | Status: ACTIVE | Noted: 2021-04-07

## 2021-04-07 PROCEDURE — 2580000003 HC RX 258: Performed by: INTERNAL MEDICINE

## 2021-04-07 PROCEDURE — 6370000000 HC RX 637 (ALT 250 FOR IP): Performed by: NURSE PRACTITIONER

## 2021-04-07 PROCEDURE — 97530 THERAPEUTIC ACTIVITIES: CPT

## 2021-04-07 PROCEDURE — 6370000000 HC RX 637 (ALT 250 FOR IP): Performed by: INTERNAL MEDICINE

## 2021-04-07 PROCEDURE — 97535 SELF CARE MNGMENT TRAINING: CPT

## 2021-04-07 PROCEDURE — 2580000003 HC RX 258: Performed by: NURSE PRACTITIONER

## 2021-04-07 PROCEDURE — 94760 N-INVAS EAR/PLS OXIMETRY 1: CPT

## 2021-04-07 PROCEDURE — 97116 GAIT TRAINING THERAPY: CPT

## 2021-04-07 PROCEDURE — 6360000002 HC RX W HCPCS: Performed by: NURSE PRACTITIONER

## 2021-04-07 PROCEDURE — 2700000000 HC OXYGEN THERAPY PER DAY

## 2021-04-07 PROCEDURE — 1200000000 HC SEMI PRIVATE

## 2021-04-07 PROCEDURE — 94640 AIRWAY INHALATION TREATMENT: CPT

## 2021-04-07 RX ORDER — SODIUM CHLORIDE 9 MG/ML
INJECTION, SOLUTION INTRAVENOUS CONTINUOUS
Status: DISCONTINUED | OUTPATIENT
Start: 2021-04-07 | End: 2021-04-09

## 2021-04-07 RX ORDER — HYDROCODONE BITARTRATE AND ACETAMINOPHEN 5; 325 MG/1; MG/1
1 TABLET ORAL EVERY 6 HOURS PRN
Status: DISCONTINUED | OUTPATIENT
Start: 2021-04-07 | End: 2021-04-09

## 2021-04-07 RX ADMIN — HYDROCODONE BITARTRATE AND ACETAMINOPHEN 1 TABLET: 5; 325 TABLET ORAL at 13:05

## 2021-04-07 RX ADMIN — DILTIAZEM HYDROCHLORIDE 120 MG: 120 CAPSULE, COATED, EXTENDED RELEASE ORAL at 09:07

## 2021-04-07 RX ADMIN — BUDESONIDE AND FORMOTEROL FUMARATE DIHYDRATE 2 PUFF: 160; 4.5 AEROSOL RESPIRATORY (INHALATION) at 20:02

## 2021-04-07 RX ADMIN — HYDROCODONE BITARTRATE AND ACETAMINOPHEN 1 TABLET: 5; 325 TABLET ORAL at 22:11

## 2021-04-07 RX ADMIN — ACETAMINOPHEN 650 MG: 325 TABLET ORAL at 09:07

## 2021-04-07 RX ADMIN — ENOXAPARIN SODIUM 40 MG: 40 INJECTION SUBCUTANEOUS at 09:06

## 2021-04-07 RX ADMIN — BUDESONIDE AND FORMOTEROL FUMARATE DIHYDRATE 2 PUFF: 160; 4.5 AEROSOL RESPIRATORY (INHALATION) at 08:19

## 2021-04-07 RX ADMIN — DOXYCYCLINE HYCLATE 100 MG: 100 TABLET, FILM COATED ORAL at 22:08

## 2021-04-07 RX ADMIN — PREDNISONE 40 MG: 20 TABLET ORAL at 09:07

## 2021-04-07 RX ADMIN — ASPIRIN 81 MG: 81 TABLET, COATED ORAL at 09:07

## 2021-04-07 RX ADMIN — DILTIAZEM HYDROCHLORIDE 120 MG: 120 CAPSULE, COATED, EXTENDED RELEASE ORAL at 22:08

## 2021-04-07 RX ADMIN — BUPROPION HYDROCHLORIDE 150 MG: 150 TABLET, EXTENDED RELEASE ORAL at 09:07

## 2021-04-07 RX ADMIN — DOXYCYCLINE HYCLATE 100 MG: 100 TABLET, FILM COATED ORAL at 09:07

## 2021-04-07 RX ADMIN — IPRATROPIUM BROMIDE AND ALBUTEROL SULFATE 1 AMPULE: .5; 3 SOLUTION RESPIRATORY (INHALATION) at 08:19

## 2021-04-07 RX ADMIN — SODIUM CHLORIDE, PRESERVATIVE FREE 10 ML: 5 INJECTION INTRAVENOUS at 09:15

## 2021-04-07 RX ADMIN — IPRATROPIUM BROMIDE AND ALBUTEROL SULFATE 1 AMPULE: .5; 3 SOLUTION RESPIRATORY (INHALATION) at 11:59

## 2021-04-07 RX ADMIN — IPRATROPIUM BROMIDE AND ALBUTEROL SULFATE 1 AMPULE: .5; 3 SOLUTION RESPIRATORY (INHALATION) at 20:02

## 2021-04-07 RX ADMIN — METOPROLOL SUCCINATE 100 MG: 50 TABLET, EXTENDED RELEASE ORAL at 09:07

## 2021-04-07 RX ADMIN — IPRATROPIUM BROMIDE AND ALBUTEROL SULFATE 1 AMPULE: .5; 3 SOLUTION RESPIRATORY (INHALATION) at 15:47

## 2021-04-07 RX ADMIN — SODIUM CHLORIDE: 9 INJECTION, SOLUTION INTRAVENOUS at 15:53

## 2021-04-07 RX ADMIN — SODIUM CHLORIDE: 9 INJECTION, SOLUTION INTRAVENOUS at 09:06

## 2021-04-07 ASSESSMENT — PAIN DESCRIPTION - PROGRESSION
CLINICAL_PROGRESSION: GRADUALLY WORSENING
CLINICAL_PROGRESSION: GRADUALLY WORSENING

## 2021-04-07 ASSESSMENT — PAIN DESCRIPTION - LOCATION
LOCATION: BACK
LOCATION: BACK

## 2021-04-07 ASSESSMENT — PAIN DESCRIPTION - FREQUENCY: FREQUENCY: INTERMITTENT

## 2021-04-07 ASSESSMENT — PAIN SCALES - GENERAL
PAINLEVEL_OUTOF10: 9
PAINLEVEL_OUTOF10: 5
PAINLEVEL_OUTOF10: 7

## 2021-04-07 ASSESSMENT — PAIN DESCRIPTION - PAIN TYPE
TYPE: CHRONIC PAIN
TYPE: ACUTE PAIN;CHRONIC PAIN

## 2021-04-07 ASSESSMENT — PAIN DESCRIPTION - DESCRIPTORS: DESCRIPTORS: ACHING

## 2021-04-07 ASSESSMENT — PAIN DESCRIPTION - ONSET: ONSET: GRADUAL

## 2021-04-07 ASSESSMENT — PAIN - FUNCTIONAL ASSESSMENT: PAIN_FUNCTIONAL_ASSESSMENT: PREVENTS OR INTERFERES SOME ACTIVE ACTIVITIES AND ADLS

## 2021-04-07 NOTE — PROGRESS NOTES
complex), Osteoarthritis, Osteopetrosis, Papanicolaou smear of cervix with atypical squamous cells of undetermined significance (ASC-US), Shingles, and Stress incontinence. has a past surgical history that includes Cholecystectomy; Breast surgery; Cataract removal (Left, 2009); Inguinal hernia repair (Right, 2015); Spine surgery (N/A, 11/25/2020); and CT BIOPSY BONE MARROW (2/12/2021). Restrictions  Restrictions/Precautions  Restrictions/Precautions: Fall Risk  Position Activity Restriction  Other position/activity restrictions: 4L O2  Subjective   General  Chart Reviewed: Yes  Patient assessed for rehabilitation services?: Yes  Additional Pertinent Hx: per ED note, Rome Schmidt is a 78 y.o. female with medical history of asthma, hypertension, tobacco abuse, COPD, renal insufficiency, cataracts, hypercholesterolemia, stress incontinence, macular degeneration, psoriasis, OA, IBS, pulmonary nodule right, frequent PVCs, adjustment disorder with anxious mood, sciatica, osteoporosis, colon polyp, venous insufficiency of bilateral lower extremities, uterovaginal prolapse, inguinal hernia, chronic constipation, compression fracture of T7, senile osteoporosis, shingles, cholecystectomy who presents the ED with complaints of aggressive weakness and shortness of air that is been ongoing for the past few days. Patient does live in an apartment home alone and states usually the family has been coming over to help take care of her, bathe her, cook for work, and drop-off groceries. However the daughter at bedside said they have recently been released from a Covid quarantine and the patient has had limited assistance from the family members. States that she does live home alone does not wear oxygen. EMS had been called numerous times over the past few days for patient's weakness and shortness of breath. When they arrived today her oxygen level was 88% on room air.   Patient does note she has had decreased p.o. intake due to the inability to cook and prepare her food for. States that she has mostly been living off prepackaged snacks and cookies. Family is concerned as she has noticed a decreased activity and weight loss. Patient states that she is unable to effectively care for herself at home anymore. She denies any recent trauma, accidents, head injuries, or falls. She denies being anticoagulated. She does note to smoking approximately half pack per day since 2015. Patient does note that she has been tapering down and believes she last smoked approximately 1 month ago. Patient's family said this is due to because no one was bringing her cigarettes. Is unsure if patient has been compliant with her medication due to her increased fatigue and weakness. Said she gets very fatigued just even getting up off of the couch to walk over to the bathroom. Family states that she has been sleeping hunched over in the couch throughout the day. Family / Caregiver Present: No  Referring Practitioner: ALYSIA Clifton CNP  Subjective  Subjective: Pt supine in bed upon arrival. Pt hesistant for OOB activities due topain. Pt reported pain 9/10 (RN notified). General Comment  Comments: okay for therapy per RN. Orientation  Orientation  Overall Orientation Status: Within Functional Limits  Objective    ADL  LE Dressing: Dependent/Total(For donning socks from bed.)  Toileting: Maximum assistance(Max A for pericare from commode due to pain.)        Balance  Sitting Balance: Stand by assistance  Standing Balance: Minimal assistance  Standing Balance  Time: Less than 1 minute  Activity: Functional Mob, transfers  Functional Mobility  Functional - Mobility Device: Rolling Walker  Activity: (To/From bedside commode)  Assist Level: Minimal assistance  Functional Mobility Comments: Pt took a few steps from EOB to bedside commode with RW and Min A with cues for safety. Pt very limited by pain at this time.   Toilet Transfers  Toilet - Technique: Stand pivot; Ambulating  Equipment Used: Standard bedside commode  Toilet Transfer: Minimal assistance; Moderate assistance  Toilet Transfers Comments: Pt Min/Mod A for sit<>stand from bedside commode to RW due to pain level. Bed mobility  Supine to Sit: Contact guard assistance  Sit to Supine: Minimal assistance  Transfers  Sit to stand: Minimal assistance  Stand to sit: Contact guard assistance  Transfer Comments: Pt Min A from EOB to RW with cues for hand placement and CGA for back to bed with cues for safety. Pt with painful expression on face during transfers. Assessment   Performance deficits / Impairments: Decreased functional mobility ; Decreased strength;Decreased ADL status; Decreased endurance;Decreased balance;Decreased high-level IADLs;Decreased safe awareness  Assessment: AMPAC score 16 and limited by the above defecits. Pt limited by severe pain on this date. Pt took a few steps from EOB to bedside commode with RW and Min A with cues for safety. Pt Max A for pericare. Pt Min A for sit<>stand transfer from EOB and bedside commode to RW. Mt Min A bed mobility due to pain. AMPAC score indicates need for continued skilled OT services to increase safety awareness, endurance and independence with ADLs. Continue with POC. OT Education: OT Role;Plan of Care;Transfer Training;Energy Conservation  REQUIRES OT FOLLOW UP: Yes  Activity Tolerance  Activity Tolerance: Patient limited by pain  Activity Tolerance: Pt rated pain 9/10. RN notified. Safety Devices  Safety Devices in place: Yes  Type of devices: Call light within reach;Gait belt;Nurse notified; Left in bed;Bed alarm in place        Plan   Plan  Times per week: 3-5x  Current Treatment Recommendations: Strengthening, Functional Mobility Training, Gait Training, Endurance Training, Balance Training, Self-Care / ADL, Safety Education & Training, Patient/Caregiver Education & Training, Equipment Evaluation, Education, & procurement        AM-PAC Inpatient Daily Activity Raw Score: 16 (04/07/21 0828)  AM-PAC Inpatient ADL T-Scale Score : 35.96 (04/07/21 8651)  ADL Inpatient CMS 0-100% Score: 53.32 (04/07/21 5208)  ADL Inpatient CMS G-Code Modifier : CK (04/07/21 3978)    Goals  Short term goals  Time Frame for Short term goals: prior to D/C: All goals ongoing  Short term goal 1: complete functional mobility and transfers Mod Ind  Short term goal 2: complete bathing and dressing Mod Ind  Short term goal 3: complete toileting Mod Ind  Short term goal 4: complete grooming at sink [address] term goals  Time Frame for Long term goals : STG=LTG  Patient Goals   Patient goals : return home       Therapy Time   Individual Concurrent Group Co-treatment   Time In 0750         Time Out 0830         Minutes 40                   Electronically signed by Sha HOWELL on 4/7/2021 at 8:44 AM     Electronically signed by SINAN Randall on 4/7/2021 at 8:49 AM    I attest that I was present for and made a skilled and mindful clinical judgement during the treatment of this patient 4/7/2021

## 2021-04-07 NOTE — PROGRESS NOTES
Hospitalist Progress Note      PCP: Desiree Soriano MD    Date of Admission: 4/2/2021        Subjective: Worsening back pain today. Medications:  Reviewed    Infusion Medications    sodium chloride 75 mL/hr at 04/07/21 1553    sodium chloride       Scheduled Medications    sodium chloride flush  10 mL Intravenous 2 times per day    enoxaparin  40 mg Subcutaneous Daily    ipratropium-albuterol  1 ampule Inhalation Q4H WA    aspirin  81 mg Oral Daily    buPROPion  150 mg Oral Daily    dilTIAZem  120 mg Oral BID    budesonide-formoterol  2 puff Inhalation BID    metoprolol succinate  100 mg Oral Daily    doxycycline hyclate  100 mg Oral 2 times per day     PRN Meds: HYDROcodone 5 mg - acetaminophen, sodium chloride flush, sodium chloride, promethazine **OR** ondansetron, polyethylene glycol, acetaminophen **OR** acetaminophen, albuterol, docusate sodium      Intake/Output Summary (Last 24 hours) at 4/7/2021 1700  Last data filed at 4/7/2021 0915  Gross per 24 hour   Intake 110 ml   Output 500 ml   Net -390 ml       Exam:    BP (!) 174/97   Pulse 77   Temp 98.1 °F (36.7 °C) (Oral)   Resp 12   Ht 5' 7\" (1.702 m)   Wt 121 lb 11.1 oz (55.2 kg)   SpO2 98%   BMI 19.06 kg/m²     General appearance: No apparent distress, appears stated age and cooperative. HEENT: Pupils equal, round, and reactive to light. Conjunctivae/corneas clear. Neck: Supple, with full range of motion. No jugular venous distention. Trachea midline. Respiratory:  Normal respiratory effort. Clear to auscultation, bilaterally without Rales/Wheezes/Rhonchi. Cardiovascular: Regular rate and rhythm with normal S1/S2 without murmurs, rubs or gallops. Abdomen: Soft, non-tender, non-distended with normal bowel sounds. Musculoskeletal: No clubbing, cyanosis or edema bilaterally. Full range of motion without deformity. Skin: Skin color, texture, turgor normal.  No rashes or lesions.   Neurologic:  Neurovascularly intact without any focal sensory/motor deficits. Cranial nerves: II-XII intact, grossly non-focal.  Psychiatric: Alert and oriented, thought content appropriate, normal insight  Capillary Refill: Brisk,< 3 seconds   Peripheral Pulses: +2 palpable, equal bilaterally       Labs:   Recent Labs     04/05/21  0549 04/06/21  0824   WBC 15.1* 12.4*   HGB 10.2* 10.9*   HCT 31.0* 33.3*    284     Recent Labs     04/05/21  0549 04/06/21  0824    136   K 3.4* 3.7   CL 98* 101   CO2 29 28   BUN 46* 30*   CREATININE 1.1 0.8   CALCIUM 8.2* 8.7   PHOS 2.7 2.2*     No results for input(s): AST, ALT, BILIDIR, BILITOT, ALKPHOS in the last 72 hours. No results for input(s): INR in the last 72 hours. No results for input(s): Paralee Fontan in the last 72 hours. Assessment/Plan:    -COPD exacerbation. . Continue bronchodilators, Solu-Medrol and doxycycline. .chest x-ray is clear.      -Acute respiratory failure with hypoxia due to COPD exacerbation. Requiring up to 3 L nasal cannula, wean down to 1 L nasal cannula. continue to wean oxygen as tolerated. . home 02 eval on dc      Dysphagia. MBS is normal - no aspiration. Pt agreed to have esophagram done. - 3/6 - this shows esophageal dysmotility with spasms, possibly Schatzki Ring.    - I will ask GI to evaluate. - she is already on CCB. - she will likely need EGD to investigate/treat this further.    - she is struggling with solid food, though wants to eat and feels hungry.    - noted GI recs - cont PO intake and SLP. OP EGD if symptoms persist once COPD better.       -Reactive leukocytosis due to COPD and steroids--continue to monitor    -Generalized weakness due to COPD exacerbation--PT and OT was consulted-disposition to SNF versus home health--patient prefers home health    -Essential hypertension stable-continue diltiazem, metoprolol.   Held Lasix and HCTZ due to RASHAWN    -Hyperlipidemia-not on statin therapy-follow-up with PCP    -Hypokalemia-continue to replete potassium    -RASHAWN--creatinine up to 1.6 from baseline of 1.0.. Likely prerenal related to relative hypotension, NSAIDs and diuretic use. .. Stopped diuretics and NSAIDs. . Started IV fluids. . Avoid hypotension    DVT Prophylaxis: Lovenox  Diet: DIET GENERAL;  Dietary Nutrition Supplements: Standard High Calorie Oral Supplement  Code Status: Full Code        Mikayla Nixon MD

## 2021-04-07 NOTE — CONSULTS
BIOPSY  2021    CT BONE MARROW BIOPSY 2021 [facility]    INGUINAL HERNIA REPAIR Right 2015    SPINE SURGERY N/A 2020    T7 KYPHOPLASTY WITH BONE BIOPSY performed by Lali Ray MD at [facility] History   Problem Relation Age of Onset    Diabetes Mother     Cancer Sister     Coronary Art Dis Sister     Coronary Art Dis Brother     Stroke Brother      Social History     Socioeconomic History    Marital status:      Spouse name: None    Number of children: None    Years of education: None    Highest education level: None   Occupational History    None   Social Needs    Financial resource strain: None    Food insecurity     Worry: None     Inability: None    Transportation needs     Medical: None     Non-medical: None   Tobacco Use    Smoking status: Former Smoker     Packs/day: 0.50     Years: 65.00     Pack years: 32.50     Types: Cigarettes     Quit date: 10/6/2020     Years since quittin.5    Smokeless tobacco: Never Used    Tobacco comment: Advised to quit repeatedly, not interested   Substance and Sexual Activity    Alcohol use: Yes     Comment: rare    Drug use: No    Sexual activity: Never   Lifestyle    Physical activity     Days per week: None     Minutes per session: None    Stress: None   Relationships    Social connections     Talks on phone: None     Gets together: None     Attends [employer] service: None     Active member of club or organization: None     Attends meetings of clubs or organizations: None     Relationship status: None    Intimate partner violence     Fear of current or ex partner: None     Emotionally abused: None     Physically abused: None     Forced sexual activity: None   Other Topics Concern    None   Social History Narrative    Self-breast exams: yes    Exercise: housework 3-4x/wk    Seatbelt use: Always    Living will: no,   additional information provided    Sexual activity: none         MEDICATIONS   SCHEDULED: sodium chloride flush, 10 mL, 2 times per day  enoxaparin, 40 mg, Daily  ipratropium-albuterol, 1 ampule, Q4H WA  aspirin, 81 mg, Daily  buPROPion, 150 mg, Daily  dilTIAZem, 120 mg, BID  budesonide-formoterol, 2 puff, BID  metoprolol succinate, 100 mg, Daily  doxycycline hyclate, 100 mg, 2 times per day      FLUIDS/DRIPS:     sodium chloride 75 mL/hr at 04/07/21 0906    sodium chloride       PRNs: sodium chloride flush, 10 mL, PRN  sodium chloride, 25 mL, PRN  promethazine, 12.5 mg, Q6H PRN    Or  ondansetron, 4 mg, Q6H PRN  polyethylene glycol, 17 g, Daily PRN  acetaminophen, 650 mg, Q6H PRN    Or  acetaminophen, 650 mg, Q6H PRN  albuterol, 2.5 mg, Q2H PRN  docusate sodium, 200 mg, BID PRN      ALLERGIES:  She   Allergies   Allergen Reactions    Adhesive Tape Other (See Comments)     Can cause irritation when on awhile    Codeine      GI upset    Iodine Hives    Lisinopril      Angioedema         REVIEW OF SYSTEMS   Pertinent ROS noted in HPI    PHYSICAL EXAM     Vitals:    04/06/21 1538 04/06/21 2001 04/07/21 0307 04/07/21 0511   BP:  (!) 169/79  132/73   Pulse:  85  80   Resp:  20  14   Temp:  98.6 °F (37 °C)  98.1 °F (36.7 °C)   TempSrc:  Oral  Oral   SpO2: 97% 94%  96%   Weight:   121 lb 11.1 oz (55.2 kg)    Height:           I/O last 3 completed shifts: In: 0 [P.O.:340]  Out: 400 [Urine:400]      Physical Exam:  General appearance: alert, cooperative, visibly uncomfortable in bed but no acute distress, appears stated age  Eyes: Anicteric  Head: Normocephalic, without obvious abnormality  Lungs: clear to auscultation bilaterally, Normal Effort  Heart: regular rate and rhythm, normal S1 and S2, no murmurs or rubs  Abdomen: soft, non-distended, non-tender. Bowel sounds normal. No masses,  no organomegaly.    Extremities: atraumatic, no cyanosis or edema  Skin: warm and dry, no jaundice  Neuro: Grossly intact, A&OX3      LABS AND IMAGING   Laboratory   Recent Labs     04/05/21  0549 04/06/21  0824   WBC swallowing but is struggling to clear new phlegm. She was admitted with a COPD exacerbation. She is not on oxygen at home and is currently using 4L. She says she has indigestion once in a while but no trouble swallowing.   -She says she has indigestion once in a while but no trouble swallowing. Denies odynophagia or globus sensation.   -No prior EGD records found. 2. Acute COPD exacerbation  3. Acute respiratory failure/Hypoxia/Shortness of Breath - due to above      RECOMMENDATIONS:    -Will hold off on EGD at this time due to difficultly breathing. It appears that her sxs are due to difficultly managing her new phlegm. Believe this will improve as COPD exacerbation begins to resolve.   -Pt can follow-up as an outpatient if pt develops difficultly swallowing     If you have any questions or need any further information, please feel free to contact our consult team.  Thank you for allowing us to participate in the care of [address] [place] [address]. The note was completed using Dragon voice recognition transcription. Every effort was made to ensure accuracy; however, inadvertent transcription errors may be present despite my best efforts to edit errors.       Kaylee Daniels PA-C

## 2021-04-07 NOTE — PROGRESS NOTES
Pt awake and alert. No c/o pain. O2 on at 2l. Breath sounds diminished. Cough non productive. Pt a little sob with exertion. Call light in reach,bed alarm on.

## 2021-04-07 NOTE — PROGRESS NOTES
Comprehensive Nutrition Assessment    Type and Reason for Visit:  Reassess    Nutrition Recommendations/Plan:   - Continue general diet   - Begin Ensure Enlive BID    Nutrition Assessment:  Follow-up. Pt is nutritionally compromised AEB moderate malnutrition status outlined below. Pt has lost 13lbs (9%) over the last five months. Currently on general diet with variable intakes 1-100%. Upon RD visit, pt states difficulty swallowing but able to tolerate water. Pt agreed to try Ensure Enlive. Will order and continue to monitor. Malnutrition Assessment:  Malnutrition Status: Moderate malnutrition    Context:  Chronic Illness     Findings of the 6 clinical characteristics of malnutrition:  Energy Intake:  Mild decrease in energy intake (Comment)  Weight Loss:  7 - 10% over 6 months     Body Fat Loss:  No significant body fat loss     Muscle Mass Loss:  1 - Mild muscle mass loss Hand (interosseous), Temples (temporalis)  Fluid Accumulation:  No significant fluid accumulation     Strength:  Not Performed    Estimated Daily Nutrient Needs:  Energy (kcal):  3990-9672 kcal (30-35 kcal/kg ABW); Weight Used for Energy Requirements:  Current     Protein (g):  58-67 gm (1.2-1.4 gm/kg ABW); Weight Used for Protein Requirements:  Current        Fluid (ml/day):   ; Method Used for Fluid Requirements:  1 ml/kcal      Nutrition Related Findings:  4L O2. Active BS. No edema. Wounds:  None       Current Nutrition Therapies:    DIET GENERAL;     Anthropometric Measures:  · Height: 5' 7\" (170.2 cm)  · Current Body Weight: 121 lb (54.9 kg)   · Admission Body Weight: 105 lb (47.6 kg)    · Usual Body Weight: 135 lb (61.2 kg)     · Ideal Body Weight: 135 lbs; % Ideal Body Weight 89.6 %   · BMI: 18.9  · Adjusted Body Weight:  ; No Adjustment   · BMI Categories: Underweight (BMI less than 22) age over 72       Nutrition Diagnosis:   · Moderate malnutrition related to inadequate protein-energy intake, swallowing difficulty as evidenced by weight loss, mild muscle loss    Nutrition Interventions:   Food and/or Nutrient Delivery:  Continue Current Diet, Start Oral Nutrition Supplement  Nutrition Education/Counseling:  Education not indicated   Coordination of Nutrition Care:  Continue to monitor while inpatient, Speech Therapy, Swallow Evaluation    Goals: Tolerate diet and consume greater than 50% of meals and supplements this admission       Nutrition Monitoring and Evaluation:   Behavioral-Environmental Outcomes:  None Identified   Food/Nutrient Intake Outcomes:  Food and Nutrient Intake, Supplement Intake  Physical Signs/Symptoms Outcomes:  Weight, Nutrition Focused Physical Findings, Chewing or Swallowing, Nausea or Vomiting     Discharge Planning:     Too soon to determine     Electronically signed by Hai Flores RD, LD on 4/7/21 at 1:24 PM EDT    Contact: [phone]

## 2021-04-07 NOTE — PROGRESS NOTES
Standard(with raised toilet seat with rails)  Bathroom Equipment: Hand-held shower, Shower chair  Home Equipment: Cane, Standard walker, Alert Button  ADL Assistance: [address]: Needs assistance(daughter assist with laundry, cleaning, grocery shopping)  Ambulation Assistance: Independent(uses cane)  Transfer Assistance: Independent(uses cane)  Active : No    Objective  Bed mobility  Supine to Sit: Stand by assistance(HOB elevated, used the rail and took increased time)  Sit to Supine: Unable to assess(the pt up to the chair)  Transfers  Sit to Stand: Contact guard assistance  Stand to sit: Contact guard assistance  Ambulation  Ambulation?: Yes  Ambulation 1  Surface: level tile  Device: Rolling Walker  Other Apparatus: O2(O2 line and IV pole managed by therapist)  Assistance: Minimal assistance  Quality of Gait: partial step through gait pattern, decreased navya; difficulty managing the walker on turns and tends to walk behind the frame of the walker  Distance: 35 feet x 1 in the room  Comments: SOB with activity with increased back pain     Balance  Sitting - Static: Good  Sitting - Dynamic: Good;-  Standing - Static: Fair;+  Standing - Dynamic: Fair(with walker)  Exercises  Knee Long Arc Quad: x 10 reps B (poor quality)  Comments: could not do more exercise due to fatigue and SOB     Plan   Plan  Times per week: 3-5x/week  Current Treatment Recommendations: Functional Mobility Training, Transfer Training, Gait Training, ROM, Strengthening  Safety Devices  Type of devices: Call light within reach, Chair alarm in place, Gait belt, Patient at risk for falls, Left in chair      AM-PAC Score  AM-PAC Inpatient Mobility Raw Score : 15 (04/07/21 1535)  AM-PAC Inpatient T-Scale Score : 39.45 (04/07/21 1535)  Mobility Inpatient CMS 0-100% Score: 57.7 (04/07/21 1535)  Mobility Inpatient CMS G-Code Modifier : CK (04/07/21 1535)          Goals  Short term goals  Time Frame for Short term goals: upon d/c - goals ongoing 4/7  Short term goal 1: Bed mobility wit flat bed with SBA. Short term goal 2: Transfer sit <> stand with SBA. Short term goal 3: Ambulate with wheeled walker 50 feet with SBA.   Patient Goals   Patient goals : to go home       Therapy Time   Individual Concurrent Group Co-treatment   Time In 0040         Time Out 4657         Minutes 34               Electronically signed by Zainab Orlando, PT [id number] on 4/7/2021 at 3:48 PM

## 2021-04-08 PROCEDURE — 6370000000 HC RX 637 (ALT 250 FOR IP): Performed by: INTERNAL MEDICINE

## 2021-04-08 PROCEDURE — 6370000000 HC RX 637 (ALT 250 FOR IP): Performed by: NURSE PRACTITIONER

## 2021-04-08 PROCEDURE — 6360000002 HC RX W HCPCS: Performed by: NURSE PRACTITIONER

## 2021-04-08 PROCEDURE — 94761 N-INVAS EAR/PLS OXIMETRY MLT: CPT

## 2021-04-08 PROCEDURE — 2580000003 HC RX 258: Performed by: NURSE PRACTITIONER

## 2021-04-08 PROCEDURE — 97530 THERAPEUTIC ACTIVITIES: CPT

## 2021-04-08 PROCEDURE — 2580000003 HC RX 258: Performed by: INTERNAL MEDICINE

## 2021-04-08 PROCEDURE — 1200000000 HC SEMI PRIVATE

## 2021-04-08 PROCEDURE — 2700000000 HC OXYGEN THERAPY PER DAY

## 2021-04-08 PROCEDURE — 97535 SELF CARE MNGMENT TRAINING: CPT

## 2021-04-08 PROCEDURE — 94640 AIRWAY INHALATION TREATMENT: CPT

## 2021-04-08 RX ADMIN — ASPIRIN 81 MG: 81 TABLET, COATED ORAL at 08:34

## 2021-04-08 RX ADMIN — IPRATROPIUM BROMIDE AND ALBUTEROL SULFATE 1 AMPULE: .5; 3 SOLUTION RESPIRATORY (INHALATION) at 20:18

## 2021-04-08 RX ADMIN — HYDROCODONE BITARTRATE AND ACETAMINOPHEN 1 TABLET: 5; 325 TABLET ORAL at 23:29

## 2021-04-08 RX ADMIN — DOXYCYCLINE HYCLATE 100 MG: 100 TABLET, FILM COATED ORAL at 08:32

## 2021-04-08 RX ADMIN — HYDROCODONE BITARTRATE AND ACETAMINOPHEN 1 TABLET: 5; 325 TABLET ORAL at 08:33

## 2021-04-08 RX ADMIN — IPRATROPIUM BROMIDE AND ALBUTEROL SULFATE 1 AMPULE: .5; 3 SOLUTION RESPIRATORY (INHALATION) at 12:14

## 2021-04-08 RX ADMIN — BUDESONIDE AND FORMOTEROL FUMARATE DIHYDRATE 2 PUFF: 160; 4.5 AEROSOL RESPIRATORY (INHALATION) at 20:18

## 2021-04-08 RX ADMIN — IPRATROPIUM BROMIDE AND ALBUTEROL SULFATE 1 AMPULE: .5; 3 SOLUTION RESPIRATORY (INHALATION) at 09:17

## 2021-04-08 RX ADMIN — BUDESONIDE AND FORMOTEROL FUMARATE DIHYDRATE 2 PUFF: 160; 4.5 AEROSOL RESPIRATORY (INHALATION) at 09:16

## 2021-04-08 RX ADMIN — SODIUM CHLORIDE: 9 INJECTION, SOLUTION INTRAVENOUS at 17:15

## 2021-04-08 RX ADMIN — METOPROLOL SUCCINATE 100 MG: 50 TABLET, EXTENDED RELEASE ORAL at 08:32

## 2021-04-08 RX ADMIN — ONDANSETRON 4 MG: 2 INJECTION INTRAMUSCULAR; INTRAVENOUS at 06:17

## 2021-04-08 RX ADMIN — SODIUM CHLORIDE, PRESERVATIVE FREE 10 ML: 5 INJECTION INTRAVENOUS at 08:34

## 2021-04-08 RX ADMIN — ENOXAPARIN SODIUM 40 MG: 40 INJECTION SUBCUTANEOUS at 08:35

## 2021-04-08 RX ADMIN — SODIUM CHLORIDE: 9 INJECTION, SOLUTION INTRAVENOUS at 04:53

## 2021-04-08 RX ADMIN — BUPROPION HYDROCHLORIDE 150 MG: 150 TABLET, EXTENDED RELEASE ORAL at 08:34

## 2021-04-08 RX ADMIN — DILTIAZEM HYDROCHLORIDE 120 MG: 120 CAPSULE, COATED, EXTENDED RELEASE ORAL at 20:37

## 2021-04-08 RX ADMIN — IPRATROPIUM BROMIDE AND ALBUTEROL SULFATE 1 AMPULE: .5; 3 SOLUTION RESPIRATORY (INHALATION) at 16:08

## 2021-04-08 RX ADMIN — DILTIAZEM HYDROCHLORIDE 120 MG: 120 CAPSULE, COATED, EXTENDED RELEASE ORAL at 08:33

## 2021-04-08 RX ADMIN — DOXYCYCLINE HYCLATE 100 MG: 100 TABLET, FILM COATED ORAL at 20:37

## 2021-04-08 ASSESSMENT — PAIN SCALES - GENERAL
PAINLEVEL_OUTOF10: 0
PAINLEVEL_OUTOF10: 5

## 2021-04-08 ASSESSMENT — PAIN DESCRIPTION - ORIENTATION: ORIENTATION: RIGHT

## 2021-04-08 ASSESSMENT — PAIN DESCRIPTION - PAIN TYPE: TYPE: CHRONIC PAIN

## 2021-04-08 ASSESSMENT — PAIN DESCRIPTION - ONSET
ONSET: ON-GOING
ONSET: ON-GOING

## 2021-04-08 ASSESSMENT — PAIN DESCRIPTION - PROGRESSION: CLINICAL_PROGRESSION: NOT CHANGED

## 2021-04-08 NOTE — PLAN OF CARE
Problem: Pain:  Description: Pain management should include both nonpharmacologic and pharmacologic interventions. Goal: Pain level will decrease  Description: Pain level will decrease  4/8/2021 1425 by Diego Chao RN  Outcome: Ongoing     Problem: Pain:  Description: Pain management should include both nonpharmacologic and pharmacologic interventions. Goal: Control of acute pain  Description: Control of acute pain  Outcome: Ongoing     Problem: Pain:  Description: Pain management should include both nonpharmacologic and pharmacologic interventions.   Goal: Control of chronic pain  Description: Control of chronic pain  Outcome: Ongoing     Problem: Falls - Risk of:  Goal: Will remain free from falls  Description: Will remain free from falls  4/8/2021 1425 by Diego Chao RN  Outcome: Met This Shift     Problem: Falls - Risk of:  Goal: Absence of physical injury  Description: Absence of physical injury  Outcome: Met This Shift     Problem: Skin Integrity:  Goal: Will show no infection signs and symptoms  Description: Will show no infection signs and symptoms  Outcome: Met This Shift     Problem: Skin Integrity:  Goal: Absence of new skin breakdown  Description: Absence of new skin breakdown  Outcome: Met This Shift     Problem: Breathing Pattern - Ineffective:  Goal: Ability to achieve and maintain a regular respiratory rate will improve  Description: Ability to achieve and maintain a regular respiratory rate will improve  4/8/2021 1425 by Diego Choa RN  Outcome: Ongoing     Problem: Infection:  Goal: Will remain free from infection  Description: Will remain free from infection  4/8/2021 1425 by Diego Chao RN  Outcome: Ongoing     Problem: Safety:  Goal: Free from accidental physical injury  Description: Free from accidental physical injury  Outcome: Met This Shift  Goal: Free from intentional harm  Description: Free from intentional harm  Outcome: Ongoing     Problem: Daily Care:  Goal: Daily care

## 2021-04-08 NOTE — PROGRESS NOTES
Occupational Therapy  Facility/Department: [facility] MED SURG  Daily Treatment Note  NAME: Christopher Turner  : 1940  MRN: [id number]    Date of Service: 2021     Christopher Turner scored a 15/24 on the AM-PAC ADL Inpatient form. Current research shows that an AM-PAC score of 17 or less is typically not associated with a discharge to the patient's home setting. Based on the patient's AM-PAC score and their current ADL deficits, it is recommended that the patient have 3-5 sessions per week of Occupational Therapy at d/c to increase the patient's independence. Please see assessment section for further patient specific details. If patient discharges prior to next session this note will serve as a discharge summary. Please see below for the latest assessment towards goals. Discharge Recommendations:  Continue to assess pending progress, 3-5 sessions per week, Patient would benefit from continued therapy after discharge       Assessment   Performance deficits / Impairments: Decreased functional mobility ; Decreased strength;Decreased ADL status; Decreased endurance;Decreased balance;Decreased high-level IADLs;Decreased safe awareness  Assessment: AMPAC score 15 and pt limited by the decifits listed above. Pt CGA for sit<>stand from EOB to RW with cues for hand placement. Pt completed functional mobility from EOB > BR > EOB > recliner with RW and CGA for IV management, walker management and safety. No over LOB noted. Pt Min A for sit<>stand from toilet to RW. Pt able stand at sink for ADL task with extended time due to pain. Pt Min A for meal management while seated at recliner. Low AMPAC score indicates need for continued skilled OT services to increase safety awareness, endurance, and independence with ADLs. Continue with POC. Patient Diagnosis(es): The primary encounter diagnosis was COPD exacerbation ([facility] [address]  Diagnoses of Hypoxia, Shortness of breath, and Fatigue, unspecified type were also weakness and shortness of breath. When they arrived today her oxygen level was 88% on room air. Patient does note she has had decreased p.o. intake due to the inability to cook and prepare her food for. States that she has mostly been living off prepackaged snacks and cookies. Family is concerned as she has noticed a decreased activity and weight loss. Patient states that she is unable to effectively care for herself at home anymore. She denies any recent trauma, accidents, head injuries, or falls. She denies being anticoagulated. She does note to smoking approximately half pack per day since 2015. Patient does note that she has been tapering down and believes she last smoked approximately 1 month ago. Patient's family said this is due to because no one was bringing her cigarettes. Is unsure if patient has been compliant with her medication due to her increased fatigue and weakness. Said she gets very fatigued just even getting up off of the couch to walk over to the bathroom. Family states that she has been sleeping hunched over in the couch throughout the day. Family / Caregiver Present: No  Referring Practitioner: ALYSIA Justice CNP  Subjective  Subjective: Pt at EOB and leaning to R side upon arrival. Pt reported \"everything fell apart\" and needed to use the restroom. General Comment  Comments: okay for therapy per RN.       Orientation  Orientation  Overall Orientation Status: Within Functional Limits  Objective    ADL  Feeding: Minimal assistance(Min A to open ketchup packet, salt, and milk from recliner)  Grooming: Minimal assistance(Min A to brush and wash hair from bed)  LE Dressing: Maximum assistance(To don new brief from toilet)  Toileting: Contact guard assistance(CGA for pericare from toilet.)        Balance  Sitting Balance: Stand by assistance  Standing Balance: Contact guard assistance  Standing Balance  Time: Approx 3 minutes  Activity: Functional Mob, transfers, ADL tasks at sink  Functional Mobility  Functional - Mobility Device: Rolling Walker  Activity: To/from bathroom  Assist Level: Minimal assistance  Functional Mobility Comments: Pt completed functional mobility from EOB > BR > EOB > recliner with RW and CGA for IV management and safety. No over LOB noted. Toilet Transfers  Toilet - Technique: Ambulating  Equipment Used: Grab bars  Toilet Transfer: Minimal assistance  Toilet Transfers Comments: Pt Min A for toilet sit<>stand transfer to RW. Bed mobility  Supine to Sit: Unable to assess  Sit to Supine: Unable to assess  Comment: Pt left in recliner chair to eat breakfast at the end of session. Transfers  Sit to stand: Contact guard assistance  Stand to sit: Contact guard assistance  Transfer Comments: Pt CGA for sit<>stand from EOB to RW with cues for hand placement, walker management and safety. Cognition  Overall Cognitive Status: Exceptions  Arousal/Alertness: Appropriate responses to stimuli  Following Commands: Follows all commands without difficulty  Attention Span: Appears intact  Memory: Appears intact  Safety Judgement: Decreased awareness of need for assistance  Insights: Decreased awareness of deficits  Initiation: Does not require cues  Sequencing: Does not require cues       Assessment   Performance deficits / Impairments: Decreased functional mobility ; Decreased strength;Decreased ADL status; Decreased endurance;Decreased balance;Decreased high-level IADLs;Decreased safe awareness  Assessment: AMPAC score 15 and pt limited by the decifits listed above. Pt CGA for sit<>stand from EOB to RW with cues for hand placement. Pt completed functional mobility from EOB > BR > EOB > recliner with RW and CGA for IV management, walker management and safety. No over LOB noted. Pt Min A for sit<>stand from toilet to RW. Pt able stand at sink for ADL task with extended time due to pain. Pt Min A for meal management while seated at recliner.  Low AMPAC score

## 2021-04-08 NOTE — PROGRESS NOTES
Hospitalist Progress Note      PCP: Audra Ferris MD    Date of Admission: 4/2/2021        Subjective:       Back pain much better today. She still has intermittent bouts of dysphagia, but is trying to eat soft food as recommended. Medications:  Reviewed    Infusion Medications    sodium chloride 75 mL/hr at 04/08/21 0453    sodium chloride       Scheduled Medications    sodium chloride flush  10 mL Intravenous 2 times per day    enoxaparin  40 mg Subcutaneous Daily    ipratropium-albuterol  1 ampule Inhalation Q4H WA    aspirin  81 mg Oral Daily    buPROPion  150 mg Oral Daily    dilTIAZem  120 mg Oral BID    budesonide-formoterol  2 puff Inhalation BID    metoprolol succinate  100 mg Oral Daily    doxycycline hyclate  100 mg Oral 2 times per day     PRN Meds: HYDROcodone 5 mg - acetaminophen, sodium chloride flush, sodium chloride, promethazine **OR** ondansetron, polyethylene glycol, acetaminophen **OR** acetaminophen, albuterol, docusate sodium      Intake/Output Summary (Last 24 hours) at 4/8/2021 1425  Last data filed at 4/8/2021 0834  Gross per 24 hour   Intake 1250 ml   Output --   Net 1250 ml       Exam:    /72   Pulse 67   Temp 98 °F (36.7 °C) (Oral)   Resp 12   Ht 5' 7\" (1.702 m)   Wt 125 lb 10.6 oz (57 kg)   SpO2 94%   BMI 19.68 kg/m²     General appearance: No apparent distress, appears stated age and cooperative. HEENT: Pupils equal, round, and reactive to light. Conjunctivae/corneas clear. Neck: Supple, with full range of motion. No jugular venous distention. Trachea midline. Respiratory:  Normal respiratory effort. Clear to auscultation, bilaterally without Rales/Wheezes/Rhonchi. Cardiovascular: Regular rate and rhythm with normal S1/S2 without murmurs, rubs or gallops. Abdomen: Soft, non-tender, non-distended with normal bowel sounds. Musculoskeletal: No clubbing, cyanosis or edema bilaterally. Full range of motion without deformity.   Skin: Skin color,

## 2021-04-08 NOTE — CARE COORDINATION
4/8 [facility] precert information checked online:  [de-identified] [id number] [facility] 04/06/2021 04/06/2021-04/08/2021 Approved 3/3/0 Level II 04/08/2021.  Electronically signed by Ellis Cunningham RN on 4/8/2021 at 9:48 AM

## 2021-04-08 NOTE — PLAN OF CARE
Problem: Pain:  Goal: Pain level will decrease  Description: Pain level will decrease  4/8/2021 0233 by Sanket Conway RN  Outcome: Ongoing     Problem: Falls - Risk of:  Goal: Will remain free from falls  Description: Will remain free from falls  4/8/2021 0233 by Sanket Conway RN  Outcome: Ongoing     Problem: Breathing Pattern - Ineffective:  Goal: Ability to achieve and maintain a regular respiratory rate will improve  Description: Ability to achieve and maintain a regular respiratory rate will improve  4/8/2021 0233 by Sanket Conway RN  Outcome: Ongoing     Problem: Infection:  Goal: Will remain free from infection  Description: Will remain free from infection  4/8/2021 0233 by Sanket Conway RN  Outcome: Ongoing

## 2021-04-09 ENCOUNTER — APPOINTMENT (OUTPATIENT)
Dept: CT IMAGING | Age: 81
DRG: 166 | End: 2021-04-09
Payer: MEDICARE

## 2021-04-09 ENCOUNTER — APPOINTMENT (OUTPATIENT)
Dept: GENERAL RADIOLOGY | Age: 81
DRG: 166 | End: 2021-04-09
Payer: MEDICARE

## 2021-04-09 LAB
ALBUMIN SERPL-MCNC: 2.6 G/DL (ref 3.4–5)
ANION GAP SERPL CALCULATED.3IONS-SCNC: 7 MMOL/L (ref 3–16)
BASOPHILS ABSOLUTE: 0 K/UL (ref 0–0.2)
BASOPHILS RELATIVE PERCENT: 0.1 %
BUN BLDV-MCNC: 31 MG/DL (ref 7–20)
CALCIUM SERPL-MCNC: 8.1 MG/DL (ref 8.3–10.6)
CHLORIDE BLD-SCNC: 107 MMOL/L (ref 99–110)
CO2: 24 MMOL/L (ref 21–32)
CREAT SERPL-MCNC: 0.6 MG/DL (ref 0.6–1.2)
EOSINOPHILS ABSOLUTE: 0.1 K/UL (ref 0–0.6)
EOSINOPHILS RELATIVE PERCENT: 0.9 %
GFR AFRICAN AMERICAN: >60
GFR NON-AFRICAN AMERICAN: >60
GLUCOSE BLD-MCNC: 96 MG/DL (ref 70–99)
HCT VFR BLD CALC: 30.1 % (ref 36–48)
HEMOGLOBIN: 9.8 G/DL (ref 12–16)
LYMPHOCYTES ABSOLUTE: 0.4 K/UL (ref 1–5.1)
LYMPHOCYTES RELATIVE PERCENT: 4 %
MCH RBC QN AUTO: 30.7 PG (ref 26–34)
MCHC RBC AUTO-ENTMCNC: 32.6 G/DL (ref 31–36)
MCV RBC AUTO: 94.1 FL (ref 80–100)
MONOCYTES ABSOLUTE: 0.8 K/UL (ref 0–1.3)
MONOCYTES RELATIVE PERCENT: 8.3 %
NEUTROPHILS ABSOLUTE: 7.9 K/UL (ref 1.7–7.7)
NEUTROPHILS RELATIVE PERCENT: 86.7 %
PDW BLD-RTO: 16 % (ref 12.4–15.4)
PHOSPHORUS: 1.9 MG/DL (ref 2.5–4.9)
PLATELET # BLD: 223 K/UL (ref 135–450)
PMV BLD AUTO: 8.3 FL (ref 5–10.5)
POTASSIUM SERPL-SCNC: 4.6 MMOL/L (ref 3.5–5.1)
RBC # BLD: 3.2 M/UL (ref 4–5.2)
SODIUM BLD-SCNC: 138 MMOL/L (ref 136–145)
WBC # BLD: 9.2 K/UL (ref 4–11)

## 2021-04-09 PROCEDURE — 6370000000 HC RX 637 (ALT 250 FOR IP): Performed by: NURSE PRACTITIONER

## 2021-04-09 PROCEDURE — 97530 THERAPEUTIC ACTIVITIES: CPT

## 2021-04-09 PROCEDURE — 1200000000 HC SEMI PRIVATE

## 2021-04-09 PROCEDURE — 80069 RENAL FUNCTION PANEL: CPT

## 2021-04-09 PROCEDURE — 87641 MR-STAPH DNA AMP PROBE: CPT

## 2021-04-09 PROCEDURE — 6360000002 HC RX W HCPCS: Performed by: NURSE PRACTITIONER

## 2021-04-09 PROCEDURE — 2580000003 HC RX 258: Performed by: NURSE PRACTITIONER

## 2021-04-09 PROCEDURE — 6360000002 HC RX W HCPCS: Performed by: INTERNAL MEDICINE

## 2021-04-09 PROCEDURE — 85025 COMPLETE CBC W/AUTO DIFF WBC: CPT

## 2021-04-09 PROCEDURE — 36415 COLL VENOUS BLD VENIPUNCTURE: CPT

## 2021-04-09 PROCEDURE — 2700000000 HC OXYGEN THERAPY PER DAY

## 2021-04-09 PROCEDURE — 2580000003 HC RX 258: Performed by: INTERNAL MEDICINE

## 2021-04-09 PROCEDURE — 97129 THER IVNTJ 1ST 15 MIN: CPT

## 2021-04-09 PROCEDURE — 6370000000 HC RX 637 (ALT 250 FOR IP): Performed by: INTERNAL MEDICINE

## 2021-04-09 PROCEDURE — 71045 X-RAY EXAM CHEST 1 VIEW: CPT

## 2021-04-09 PROCEDURE — 92526 ORAL FUNCTION THERAPY: CPT

## 2021-04-09 PROCEDURE — 94761 N-INVAS EAR/PLS OXIMETRY MLT: CPT

## 2021-04-09 PROCEDURE — 97116 GAIT TRAINING THERAPY: CPT

## 2021-04-09 PROCEDURE — 94640 AIRWAY INHALATION TREATMENT: CPT

## 2021-04-09 PROCEDURE — 72128 CT CHEST SPINE W/O DYE: CPT

## 2021-04-09 PROCEDURE — 72131 CT LUMBAR SPINE W/O DYE: CPT

## 2021-04-09 RX ORDER — OXYCODONE AND ACETAMINOPHEN 10; 325 MG/1; MG/1
1 TABLET ORAL EVERY 4 HOURS PRN
Status: DISCONTINUED | OUTPATIENT
Start: 2021-04-09 | End: 2021-04-15 | Stop reason: HOSPADM

## 2021-04-09 RX ORDER — SODIUM CHLORIDE FOR INHALATION 3 %
4 VIAL, NEBULIZER (ML) INHALATION PRN
Status: DISCONTINUED | OUTPATIENT
Start: 2021-04-09 | End: 2021-04-15 | Stop reason: HOSPADM

## 2021-04-09 RX ORDER — METHYLPREDNISOLONE SODIUM SUCCINATE 40 MG/ML
40 INJECTION, POWDER, LYOPHILIZED, FOR SOLUTION INTRAMUSCULAR; INTRAVENOUS EVERY 12 HOURS
Status: DISCONTINUED | OUTPATIENT
Start: 2021-04-09 | End: 2021-04-11

## 2021-04-09 RX ORDER — HYDROCODONE BITARTRATE AND ACETAMINOPHEN 5; 325 MG/1; MG/1
1 TABLET ORAL EVERY 6 HOURS PRN
Status: DISCONTINUED | OUTPATIENT
Start: 2021-04-09 | End: 2021-04-15 | Stop reason: HOSPADM

## 2021-04-09 RX ORDER — METOPROLOL SUCCINATE 50 MG/1
50 TABLET, EXTENDED RELEASE ORAL DAILY
Status: DISCONTINUED | OUTPATIENT
Start: 2021-04-10 | End: 2021-04-15 | Stop reason: HOSPADM

## 2021-04-09 RX ADMIN — IPRATROPIUM BROMIDE AND ALBUTEROL SULFATE 1 AMPULE: .5; 3 SOLUTION RESPIRATORY (INHALATION) at 20:36

## 2021-04-09 RX ADMIN — METOPROLOL SUCCINATE 100 MG: 50 TABLET, EXTENDED RELEASE ORAL at 08:35

## 2021-04-09 RX ADMIN — IPRATROPIUM BROMIDE AND ALBUTEROL SULFATE 1 AMPULE: .5; 3 SOLUTION RESPIRATORY (INHALATION) at 15:59

## 2021-04-09 RX ADMIN — METHYLPREDNISOLONE SODIUM SUCCINATE 40 MG: 40 INJECTION, POWDER, FOR SOLUTION INTRAMUSCULAR; INTRAVENOUS at 12:24

## 2021-04-09 RX ADMIN — ENOXAPARIN SODIUM 40 MG: 40 INJECTION SUBCUTANEOUS at 08:35

## 2021-04-09 RX ADMIN — SODIUM CHLORIDE 1500 MG: 900 INJECTION INTRAVENOUS at 12:24

## 2021-04-09 RX ADMIN — SODIUM CHLORIDE, PRESERVATIVE FREE 10 ML: 5 INJECTION INTRAVENOUS at 20:06

## 2021-04-09 RX ADMIN — BUPROPION HYDROCHLORIDE 150 MG: 150 TABLET, EXTENDED RELEASE ORAL at 08:35

## 2021-04-09 RX ADMIN — BUDESONIDE AND FORMOTEROL FUMARATE DIHYDRATE 2 PUFF: 160; 4.5 AEROSOL RESPIRATORY (INHALATION) at 20:36

## 2021-04-09 RX ADMIN — DILTIAZEM HYDROCHLORIDE 120 MG: 120 CAPSULE, COATED, EXTENDED RELEASE ORAL at 20:05

## 2021-04-09 RX ADMIN — DOXYCYCLINE HYCLATE 100 MG: 100 TABLET, FILM COATED ORAL at 08:35

## 2021-04-09 RX ADMIN — OXYCODONE AND ACETAMINOPHEN 1 TABLET: 10; 325 TABLET ORAL at 18:02

## 2021-04-09 RX ADMIN — BUDESONIDE AND FORMOTEROL FUMARATE DIHYDRATE 2 PUFF: 160; 4.5 AEROSOL RESPIRATORY (INHALATION) at 08:03

## 2021-04-09 RX ADMIN — IPRATROPIUM BROMIDE AND ALBUTEROL SULFATE 1 AMPULE: .5; 3 SOLUTION RESPIRATORY (INHALATION) at 11:41

## 2021-04-09 RX ADMIN — DILTIAZEM HYDROCHLORIDE 120 MG: 120 CAPSULE, COATED, EXTENDED RELEASE ORAL at 08:35

## 2021-04-09 RX ADMIN — SODIUM CHLORIDE 1500 MG: 900 INJECTION INTRAVENOUS at 18:02

## 2021-04-09 RX ADMIN — SODIUM CHLORIDE: 9 INJECTION, SOLUTION INTRAVENOUS at 04:45

## 2021-04-09 RX ADMIN — HYDROCODONE BITARTRATE AND ACETAMINOPHEN 1 TABLET: 5; 325 TABLET ORAL at 04:45

## 2021-04-09 RX ADMIN — HYDROCODONE BITARTRATE AND ACETAMINOPHEN 1 TABLET: 5; 325 TABLET ORAL at 11:54

## 2021-04-09 RX ADMIN — OXYCODONE AND ACETAMINOPHEN 1 TABLET: 10; 325 TABLET ORAL at 13:53

## 2021-04-09 RX ADMIN — ASPIRIN 81 MG: 81 TABLET, COATED ORAL at 08:35

## 2021-04-09 RX ADMIN — IPRATROPIUM BROMIDE AND ALBUTEROL SULFATE 1 AMPULE: .5; 3 SOLUTION RESPIRATORY (INHALATION) at 08:03

## 2021-04-09 ASSESSMENT — PAIN DESCRIPTION - ONSET
ONSET: ON-GOING
ONSET: ON-GOING

## 2021-04-09 ASSESSMENT — PAIN SCALES - GENERAL
PAINLEVEL_OUTOF10: 0
PAINLEVEL_OUTOF10: 7
PAINLEVEL_OUTOF10: 5
PAINLEVEL_OUTOF10: 5
PAINLEVEL_OUTOF10: 10

## 2021-04-09 ASSESSMENT — PAIN DESCRIPTION - ORIENTATION
ORIENTATION: LOWER
ORIENTATION: LOWER
ORIENTATION: RIGHT

## 2021-04-09 ASSESSMENT — PAIN DESCRIPTION - FREQUENCY
FREQUENCY: CONTINUOUS
FREQUENCY: CONTINUOUS
FREQUENCY: INTERMITTENT
FREQUENCY: CONTINUOUS

## 2021-04-09 ASSESSMENT — PAIN DESCRIPTION - PAIN TYPE
TYPE: CHRONIC PAIN
TYPE: CHRONIC PAIN

## 2021-04-09 ASSESSMENT — PAIN DESCRIPTION - DESCRIPTORS
DESCRIPTORS: ACHING
DESCRIPTORS: ACHING

## 2021-04-09 ASSESSMENT — PAIN DESCRIPTION - LOCATION
LOCATION: BACK

## 2021-04-09 NOTE — PLAN OF CARE
Problem: Pain:  Goal: Pain level will decrease  Description: Pain level will decrease  5/4/4199 4929 by Trisha Bhatia RN  Outcome: Ongoing  4/8/2021 1425 by Kelsea Humphrey RN  Outcome: Ongoing  Goal: Control of acute pain  Description: Control of acute pain  5/2/6077 9862 by Trisha Bhatia RN  Outcome: Ongoing  4/8/2021 1425 by Kelsea Humphrey RN  Outcome: Ongoing  Goal: Control of chronic pain  Description: Control of chronic pain  2/9/9398 1450 by Trisha Bhatia RN  Outcome: Ongoing  4/8/2021 1425 by Kelsea Humphrey RN  Outcome: Ongoing     Problem: Falls - Risk of:  Goal: Will remain free from falls  Description: Will remain free from falls  1/8/9867 4652 by Trisha Bhatia RN  Outcome: Ongoing  4/8/2021 1425 by Kelsea Humphrey RN  Outcome: Met This Shift  Goal: Absence of physical injury  Description: Absence of physical injury  2/0/2026 3546 by Trisha Bhatia RN  Outcome: Ongoing  4/8/2021 1425 by Kelsea Humphrey RN  Outcome: Met This Shift     Problem: Skin Integrity:  Goal: Will show no infection signs and symptoms  Description: Will show no infection signs and symptoms  7/7/2975 9741 by Trisha Bhatia RN  Outcome: Ongoing  4/8/2021 1425 by Kelsea Humphrey RN  Outcome: Met This Shift  Goal: Absence of new skin breakdown  Description: Absence of new skin breakdown  5/6/3962 4465 by Trisha Bhatia RN  Outcome: Ongoing  4/8/2021 1425 by Kelsea Humphrey RN  Outcome: Met This Shift     Problem: Breathing Pattern - Ineffective:  Goal: Ability to achieve and maintain a regular respiratory rate will improve  Description: Ability to achieve and maintain a regular respiratory rate will improve  3/1/0041 1397 by Trisha Bhatia RN  Outcome: Ongoing  4/8/2021 1425 by Kelsea Humphrey RN  Outcome: Ongoing     Problem: Infection:  Goal: Will remain free from infection  Description: Will remain free from infection  4/4/2322 7746 by Trisha Bhatia RN  Outcome: Ongoing  4/8/2021 1425 by Kelsea Humphrey RN  Outcome: Ongoing     Problem: Safety:  Goal: Free from accidental physical injury  Description: Free from accidental physical injury  4/0/3909 3132 by Miranda Hook RN  Outcome: Ongoing  4/8/2021 1425 by Mick Caldwell RN  Outcome: Met This Shift  Goal: Free from intentional harm  Description: Free from intentional harm  8/9/0823 1193 by Miranda Hook RN  Outcome: Ongoing  4/8/2021 1425 by Mick Caldwell RN  Outcome: Ongoing     Problem: Daily Care:  Goal: Daily care needs are met  Description: Daily care needs are met  1/0/8102 9182 by Miranda Hook RN  Outcome: Ongoing  4/8/2021 1425 by Mick Caldwell RN  Outcome: Ongoing     Problem: Discharge Planning:  Goal: Patients continuum of care needs are met  Description: Patients continuum of care needs are met  6/4/7599 1774 by Miranda Hook RN  Outcome: Ongoing  4/8/2021 1425 by Mick Caldwell RN  Outcome: Ongoing     Problem: Nutrition  Goal: Optimal nutrition therapy  4/6/8929 5400 by Miranda oHok RN  Outcome: Ongoing  4/8/2021 1425 by Mick Caldwell RN  Outcome: Met This Shift

## 2021-04-09 NOTE — PROGRESS NOTES
Hospitalist Progress Note      PCP: Dimitris Mariee MD    Date of Admission: 4/2/2021        Subjective:       Severe back pain today. Worsening congested cough. O2 requirement up to 4l/min today. Medications:  Reviewed    Infusion Medications    sodium chloride       Scheduled Medications    [START ON 4/10/2021] metoprolol succinate  50 mg Oral Daily    ampicillin-sulbactam  1,500 mg Intravenous Q6H    methylPREDNISolone  40 mg Intravenous Q12H    sodium chloride flush  10 mL Intravenous 2 times per day    enoxaparin  40 mg Subcutaneous Daily    ipratropium-albuterol  1 ampule Inhalation Q4H WA    aspirin  81 mg Oral Daily    buPROPion  150 mg Oral Daily    dilTIAZem  120 mg Oral BID    budesonide-formoterol  2 puff Inhalation BID     PRN Meds: oxyCODONE-acetaminophen, HYDROcodone 5 mg - acetaminophen, sodium chloride (Inhalant), sodium chloride flush, sodium chloride, promethazine **OR** ondansetron, polyethylene glycol, acetaminophen **OR** acetaminophen, albuterol, docusate sodium    No intake or output data in the 24 hours ending 04/09/21 1414    Exam:    BP (!) 151/72   Pulse 77   Temp 97.7 °F (36.5 °C) (Oral)   Resp 16   Ht 5' 7\" (1.702 m)   Wt 128 lb 15.5 oz (58.5 kg)   SpO2 93%   BMI 20.20 kg/m²     General appearance: No apparent distress, appears stated age and cooperative. HEENT: Pupils equal, round, and reactive to light. Conjunctivae/corneas clear. Neck: Supple, with full range of motion. No jugular venous distention. Trachea midline. Respiratory:  Normal respiratory effort. Clear to auscultation, bilaterally without Rales/Wheezes/Rhonchi. Cardiovascular: Regular rate and rhythm with normal S1/S2 without murmurs, rubs or gallops. Abdomen: Soft, non-tender, non-distended with normal bowel sounds. Musculoskeletal: No clubbing, cyanosis or edema bilaterally. Full range of motion without deformity.   Skin: Skin color, texture, turgor normal.  No rashes or lesions. Neurologic:  Neurovascularly intact without any focal sensory/motor deficits. Cranial nerves: II-XII intact, grossly non-focal.  Psychiatric: Alert and oriented, thought content appropriate, normal insight  Capillary Refill: Brisk,< 3 seconds   Peripheral Pulses: +2 palpable, equal bilaterally       Labs:   Recent Labs     04/09/21 0914   WBC 9.2   HGB 9.8*   HCT 30.1*        Recent Labs     04/09/21 0914      K 4.6      CO2 24   BUN 31*   CREATININE 0.6   CALCIUM 8.1*   PHOS 1.9*     No results for input(s): AST, ALT, BILIDIR, BILITOT, ALKPHOS in the last 72 hours. No results for input(s): INR in the last 72 hours. No results for input(s): [place] in the last 72 hours. Assessment/Plan:    -COPD exacerbation. Worse today. Resume solumedrol. Add 3% nebulizer. Abx to Unasyn IV. -Acute respiratory failure with hypoxia due to COPD exacerbation. Up to 4l/min today - see above. Dysphagia. MBS is normal - no aspiration. Pt agreed to have esophagram done. - 3/6 - this shows esophageal dysmotility with spasms, possibly Schatzki Ring.    - she is already on CCB. - she will likely need EGD to investigate/treat this further.    - she is struggling with solid food, though wants to eat and feels hungry.    - noted GI recs - cont PO intake and SLP. OP EGD if symptoms persist once COPD better. Back Pain   Hx of T7 compression fracture s/p kyphoplasty in the past.   Severe mid to lower back pain today - will get CT L and T spine. Cont pain control.       -Generalized weakness due to COPD exacerbation--PT and OT was consulted-disposition to SNF versus home health-  Likely [facility] on discharge.         -Essential hypertension stable-continue diltiazem, metoprolol. Held Lasix and HCTZ due to RASHAWN        -RASHAWN--creatinine up to 1.6 from baseline of 1.0.. Likely prerenal related to relative hypotension, NSAIDs and diuretic use. ..  Stopped diuretics and NSAIDs. . Started IV fluids. . Avoid hypotension        DVT Prophylaxis: Lovenox  Diet: DIET GENERAL;  Dietary Nutrition Supplements: Standard High Calorie Oral Supplement  Dietary Nutrition Supplements: Clear Liquid Oral Supplement  Code Status: Full Code        Sindy Damon MD

## 2021-04-09 NOTE — PROGRESS NOTES
Comprehensive Nutrition Assessment    Type and Reason for Visit:  Reassess    Nutrition Recommendations/Plan:   - Continue general diet   - Continue Ensure Enlive BID  - Begin Ensure Clear BID    Nutrition Assessment:  Follow-up. Pt reports swallowing is improving. Pt states good appetite and eating OK. On general diet receiving Ensure Enlive. Pt states the Ensure Enlive is okay. Will trial Ensure clear while keeping Ensure Enlive BID. Will continue to monitor. Malnutrition Assessment:  Malnutrition Status: Moderate malnutrition      Estimated Daily Nutrient Needs:  Energy (kcal):  8440-7458 kcal (30-35 kcal/kg ABW); Weight Used for Energy Requirements:  Current     Protein (g):  58-67 gm (1.2-1.4 gm/kg ABW); Weight Used for Protein Requirements:  Current        Fluid (ml/day):1 ml/kcal      Nutrition Related Findings:  +3 liters. Active BS. No edema. Wounds:  None       Current Nutrition Therapies:    DIET GENERAL;  Dietary Nutrition Supplements: Standard High Calorie Oral Supplement    Anthropometric Measures:  · Height: 5' 7\" (170.2 cm)  · Current Body Weight: 128 lb (58.1 kg)   · Admission Body Weight: 105 lb (47.6 kg)    · Usual Body Weight: 135 lb (61.2 kg)     · Ideal Body Weight: 135 lbs; % Ideal Body Weight 94.8 %   · BMI: 20  · Adjusted Body Weight:  ; No Adjustment   · BMI Categories: Underweight (BMI less than 22) age over 72       Nutrition Diagnosis:   · Moderate malnutrition related to inadequate protein-energy intake, swallowing difficulty as evidenced by weight loss, mild muscle loss    Nutrition Interventions:   Food and/or Nutrient Delivery:  Continue Current Diet, Continue Oral Nutrition Supplement, Start Oral Nutrition Supplement  Nutrition Education/Counseling:  Education not indicated   Coordination of Nutrition Care:  Continue to monitor while inpatient    Goals:   Tolerate diet and consume greater than 50% of meals and supplements this admission       Nutrition Monitoring and Evaluation:   Behavioral-Environmental Outcomes:  None Identified   Food/Nutrient Intake Outcomes:  Food and Nutrient Intake, Supplement Intake  Physical Signs/Symptoms Outcomes:  Chewing or Swallowing, Nutrition Focused Physical Findings, Weight     Discharge Planning:     Too soon to determine     Electronically signed by Bernice Marquis RD, LD on 4/9/21 at 1:14 PM EDT    Contact: [phone]

## 2021-04-09 NOTE — PROGRESS NOTES
hypertension, hyperlipidemia and COPD.  Presents to the emergency room for evaluation following a 4 to 5-day history of increasing shortness of breath, increasing weakness and fatigue, and her family reports that she has not been eating and has been losing weight.  The patient lives alone and her family normally helps her, but they have not been able to as they are quarantining with Carry Baas does not use oxygen at home. Hubert Morales has called EMS several times over the past few days.  Today they found her to have an oxygen saturation of 88% on room air.  In the emergency room she was found to have an acute COPD exacerbation. 4/4/2021: reported difficulty swallowing eggs    4/5/2021 Clinical Evaluation of Swallowing completed with recommendations for MBSS to furthr assess pharyngeal phase of the swallow  4/5/2021 MBSS completed with recommendations for regular diet with thin liquids   4/6/2021 Esophagram :   Impression   Esophageal dysmotility with tertiary contractions and spasms.       Small hiatal hernia with focal persistent defect compatible with what appears   to be a Schatzki's ring.  Evaluation is limited due to patient's ability to   position for exam.           Subjective:     Current diet   Regular food consistency diet  Thin liquids     Comments regarding tolerating Current Diet:   Nurse and chart reflect reduced intake of solids; increased back pain. Objective:     Pain   Patient Currently in Pain:\" I have bad back pain which is chronic\". Just received pain meds    Cognitive/Behavior   Pt was lying on left side in bed, drinking soda via straw. Cites positioning d/t back pain. Pt was verbally responsive and able to follow one step commands.   4L/min O2 via nasal canula  Granddaughter at bedside    Positioning     agreeable to repositioning upright midline in bed with assist of aide after education for rationale    PO Trials:  · Thin Liquids straw: No overt clinical s/s of aspiration and no

## 2021-04-09 NOTE — PLAN OF CARE
met  4/9/2021 0802 by Richy Gomez RN  Outcome: Ongoing     Problem: Discharge Planning:  Goal: Patients continuum of care needs are met  Description: Patients continuum of care needs are met  4/9/2021 0802 by Richy Gomez RN  Outcome: Ongoing     Problem: Nutrition  Goal: Optimal nutrition therapy  4/9/2021 0802 by Richy Gomez RN  Outcome: Ongoing

## 2021-04-09 NOTE — PROGRESS NOTES
Physical Therapy    Facility/Department: [address] MED SURG  Daily Note  If patient discharges prior to next session this note will serve as a discharge summary. Please see below for the latest assessment towards goals. NAME: Christopher Turner  : 1940  MRN: [id number]    Date of Service: 2021    Discharge Recommendations:  Patient would benefit from continued therapy after discharge, 3-5 sessions per week   Christopher Turner scored a 13/24 on the AM-PAC short mobility form. Current research shows that an AM-PAC score of 17 or less is typically not associated with a discharge to the patient's home setting. Based on the patient's AM-PAC score and their current functional mobility deficits, it is recommended that the patient have 3-5 sessions per week of Physical Therapy at d/c to increase the patient's independence. Please see assessment section for further patient specific details. PT Equipment Recommendations  Equipment Needed: No  Other: defer to next level of care    Assessment   Assessment: Today, the pt con't to be limited in mobility due to pain and SOB. She requires significant seated rest breaks with any activity. She is requiring min/mod A for bed mobility, min/mod A for transfers and ambulation with a walker with min A. She was only able to walk 10 feet x 2, use the commode and wash her hands during the session today. She appears SOB and in pain with any activity and declined getting up to the chair. Anticipate that the pt will need con't skilled PT at d/c but at a low intensity initially. Will con't to follow and possibly see the pt as a cotx with OT for mobility. Specific instructions for Next Treatment: cotx  Prognosis: Guarded; Fair  PT Education: PT Role;General Safety;Gait Training;Transfer Training  Barriers to Learning: pain  REQUIRES PT FOLLOW UP: Yes  Activity Tolerance  Activity Tolerance: Patient limited by fatigue;Patient limited by pain; Patient limited by endurance  Activity sink)  Bathroom Toilet: Standard(with raised toilet seat with rails)  Bathroom Equipment: Hand-held shower, Shower chair  Home Equipment: Cane, Standard walker, Alert Button  ADL Assistance: Independent  Homemaking Assistance: Needs assistance(daughter assist with laundry, cleaning, grocery shopping)  Ambulation Assistance: Independent(uses cane)  Transfer Assistance: Independent(uses cane)  Active : No    Objective  Bed mobility  Supine to Sit: Minimal assistance  Sit to Supine: Moderate assistance  Scooting: Moderate assistance  Transfers  Sit to Stand: Minimal Assistance; Moderate Assistance  Stand to sit: Minimal Assistance  Ambulation  Ambulation?: Yes  Ambulation 1  Surface: level tile  Device: Rolling Walker  Assistance: Minimal assistance  Quality of Gait: tends to walk far behind the fram of the walker; very flexed posture today; decreased B step clearance and step length; unsteady and a fall risk  Distance: 10 feet x 2  Comments: used commode during session; many long seated rest breaks needed due to pain and SOB     Balance  Comments: seated EOB with CGA; seated on toilet with SBA abd seated in chair at the sink with CGA and resting head on the sink; the pt stood with min A at the commode for managment of the depends and stood at the sink to wash hands with CGA.   Other activities: while performing mobility and using the commode and the sink, the pt needed significantly long seated rest breaks due to pain and SOB; the pt leans forward onto the walker at the commode and onto the sink when resting after washing hands    Plan   Plan  Times per week: 3-5x/week  Specific instructions for Next Treatment: cotx  Current Treatment Recommendations: Functional Mobility Training, Transfer Training, Gait Training, Strengthening, ROM  Safety Devices  Type of devices: Call light within reach, Bed alarm in place, Gait belt, Patient at risk for falls, Left in bed      AM-PAC Score  AM-PAC Inpatient Mobility Raw Score

## 2021-04-09 NOTE — PLAN OF CARE
Nutrition Problem #1: Moderate malnutrition  Intervention: Food and/or Nutrient Delivery: Continue Current Diet, Continue Oral Nutrition Supplement, Start Oral Nutrition Supplement  Nutritional Goals:  Tolerate diet and consume greater than 50% of meals and supplements this admission

## 2021-04-10 LAB — MRSA SCREEN RT-PCR: NORMAL

## 2021-04-10 PROCEDURE — 2700000000 HC OXYGEN THERAPY PER DAY

## 2021-04-10 PROCEDURE — 2580000003 HC RX 258: Performed by: NURSE PRACTITIONER

## 2021-04-10 PROCEDURE — 6360000002 HC RX W HCPCS: Performed by: NURSE PRACTITIONER

## 2021-04-10 PROCEDURE — 6370000000 HC RX 637 (ALT 250 FOR IP): Performed by: INTERNAL MEDICINE

## 2021-04-10 PROCEDURE — 6370000000 HC RX 637 (ALT 250 FOR IP): Performed by: NURSE PRACTITIONER

## 2021-04-10 PROCEDURE — 6360000002 HC RX W HCPCS: Performed by: INTERNAL MEDICINE

## 2021-04-10 PROCEDURE — 94640 AIRWAY INHALATION TREATMENT: CPT

## 2021-04-10 PROCEDURE — 97535 SELF CARE MNGMENT TRAINING: CPT

## 2021-04-10 PROCEDURE — 97530 THERAPEUTIC ACTIVITIES: CPT

## 2021-04-10 PROCEDURE — 2580000003 HC RX 258: Performed by: INTERNAL MEDICINE

## 2021-04-10 PROCEDURE — 94760 N-INVAS EAR/PLS OXIMETRY 1: CPT

## 2021-04-10 PROCEDURE — 1200000000 HC SEMI PRIVATE

## 2021-04-10 RX ORDER — OYSTER SHELL CALCIUM WITH VITAMIN D 500; 200 MG/1; [IU]/1
2 TABLET, FILM COATED ORAL 2 TIMES DAILY
Status: DISCONTINUED | OUTPATIENT
Start: 2021-04-10 | End: 2021-04-15 | Stop reason: HOSPADM

## 2021-04-10 RX ADMIN — BUDESONIDE AND FORMOTEROL FUMARATE DIHYDRATE 2 PUFF: 160; 4.5 AEROSOL RESPIRATORY (INHALATION) at 08:01

## 2021-04-10 RX ADMIN — SODIUM CHLORIDE, PRESERVATIVE FREE 10 ML: 5 INJECTION INTRAVENOUS at 21:42

## 2021-04-10 RX ADMIN — SODIUM CHLORIDE 1500 MG: 900 INJECTION INTRAVENOUS at 01:37

## 2021-04-10 RX ADMIN — ENOXAPARIN SODIUM 40 MG: 40 INJECTION SUBCUTANEOUS at 08:41

## 2021-04-10 RX ADMIN — SODIUM CHLORIDE 1500 MG: 900 INJECTION INTRAVENOUS at 13:37

## 2021-04-10 RX ADMIN — SODIUM CHLORIDE 1500 MG: 900 INJECTION INTRAVENOUS at 05:46

## 2021-04-10 RX ADMIN — METHYLPREDNISOLONE SODIUM SUCCINATE 40 MG: 40 INJECTION, POWDER, FOR SOLUTION INTRAMUSCULAR; INTRAVENOUS at 01:38

## 2021-04-10 RX ADMIN — BUPROPION HYDROCHLORIDE 150 MG: 150 TABLET, EXTENDED RELEASE ORAL at 08:40

## 2021-04-10 RX ADMIN — BUDESONIDE AND FORMOTEROL FUMARATE DIHYDRATE 2 PUFF: 160; 4.5 AEROSOL RESPIRATORY (INHALATION) at 20:41

## 2021-04-10 RX ADMIN — IPRATROPIUM BROMIDE AND ALBUTEROL SULFATE 1 AMPULE: .5; 3 SOLUTION RESPIRATORY (INHALATION) at 15:44

## 2021-04-10 RX ADMIN — IPRATROPIUM BROMIDE AND ALBUTEROL SULFATE 1 AMPULE: .5; 3 SOLUTION RESPIRATORY (INHALATION) at 08:01

## 2021-04-10 RX ADMIN — OYSTER SHELL CALCIUM WITH VITAMIN D 2 TABLET: 500; 200 TABLET, FILM COATED ORAL at 21:40

## 2021-04-10 RX ADMIN — OXYCODONE AND ACETAMINOPHEN 1 TABLET: 10; 325 TABLET ORAL at 21:40

## 2021-04-10 RX ADMIN — IPRATROPIUM BROMIDE AND ALBUTEROL SULFATE 1 AMPULE: .5; 3 SOLUTION RESPIRATORY (INHALATION) at 20:41

## 2021-04-10 RX ADMIN — OXYCODONE AND ACETAMINOPHEN 1 TABLET: 10; 325 TABLET ORAL at 13:08

## 2021-04-10 RX ADMIN — OYSTER SHELL CALCIUM WITH VITAMIN D 2 TABLET: 500; 200 TABLET, FILM COATED ORAL at 08:40

## 2021-04-10 RX ADMIN — METOPROLOL SUCCINATE 50 MG: 50 TABLET, EXTENDED RELEASE ORAL at 08:40

## 2021-04-10 RX ADMIN — METHYLPREDNISOLONE SODIUM SUCCINATE 40 MG: 40 INJECTION, POWDER, FOR SOLUTION INTRAMUSCULAR; INTRAVENOUS at 13:37

## 2021-04-10 RX ADMIN — OXYCODONE AND ACETAMINOPHEN 1 TABLET: 10; 325 TABLET ORAL at 18:39

## 2021-04-10 RX ADMIN — DILTIAZEM HYDROCHLORIDE 120 MG: 120 CAPSULE, COATED, EXTENDED RELEASE ORAL at 08:41

## 2021-04-10 RX ADMIN — ASPIRIN 81 MG: 81 TABLET, COATED ORAL at 08:40

## 2021-04-10 RX ADMIN — SODIUM CHLORIDE 1500 MG: 900 INJECTION INTRAVENOUS at 18:36

## 2021-04-10 RX ADMIN — IPRATROPIUM BROMIDE AND ALBUTEROL SULFATE 1 AMPULE: .5; 3 SOLUTION RESPIRATORY (INHALATION) at 11:52

## 2021-04-10 RX ADMIN — DILTIAZEM HYDROCHLORIDE 120 MG: 120 CAPSULE, COATED, EXTENDED RELEASE ORAL at 21:40

## 2021-04-10 ASSESSMENT — PAIN DESCRIPTION - LOCATION
LOCATION: BACK

## 2021-04-10 ASSESSMENT — PAIN SCALES - GENERAL
PAINLEVEL_OUTOF10: 5
PAINLEVEL_OUTOF10: 10
PAINLEVEL_OUTOF10: 10
PAINLEVEL_OUTOF10: 0
PAINLEVEL_OUTOF10: 0

## 2021-04-10 ASSESSMENT — PAIN DESCRIPTION - FREQUENCY: FREQUENCY: CONTINUOUS

## 2021-04-10 ASSESSMENT — PAIN DESCRIPTION - DESCRIPTORS
DESCRIPTORS: ACHING
DESCRIPTORS: ACHING

## 2021-04-10 ASSESSMENT — PAIN DESCRIPTION - PROGRESSION: CLINICAL_PROGRESSION: NOT CHANGED

## 2021-04-10 ASSESSMENT — PAIN DESCRIPTION - PAIN TYPE: TYPE: CHRONIC PAIN

## 2021-04-10 ASSESSMENT — PAIN - FUNCTIONAL ASSESSMENT: PAIN_FUNCTIONAL_ASSESSMENT: PREVENTS OR INTERFERES SOME ACTIVE ACTIVITIES AND ADLS

## 2021-04-10 NOTE — PLAN OF CARE
Problem: Nutrition  Goal: Optimal nutrition therapy  4/10/2021 0103 by Aaron Osborne RN  Outcome: Ongoing     Problem: Discharge Planning:  Goal: Patients continuum of care needs are met  Description: Patients continuum of care needs are met  Outcome: Ongoing     Problem: Daily Care:  Goal: Daily care needs are met  Description: Daily care needs are met  Outcome: Ongoing     Problem: Safety:  Goal: Free from intentional harm  Description: Free from intentional harm  Outcome: Ongoing     Problem: Safety:  Goal: Free from accidental physical injury  Description: Free from accidental physical injury  Outcome: Ongoing     Problem: Infection:  Goal: Will remain free from infection  Description: Will remain free from infection  Outcome: Ongoing     Problem: Breathing Pattern - Ineffective:  Goal: Ability to achieve and maintain a regular respiratory rate will improve  Description: Ability to achieve and maintain a regular respiratory rate will improve  Outcome: Ongoing     Problem: Skin Integrity:  Goal: Absence of new skin breakdown  Description: Absence of new skin breakdown  Outcome: Ongoing     Problem: Skin Integrity:  Goal: Will show no infection signs and symptoms  Description: Will show no infection signs and symptoms  Outcome: Ongoing     Problem: Falls - Risk of:  Goal: Absence of physical injury  Description: Absence of physical injury  Outcome: Ongoing     Problem: Pain:  Goal: Control of acute pain  Description: Control of acute pain  Outcome: Ongoing     Problem: Pain:  Goal: Pain level will decrease  Description: Pain level will decrease  Outcome: Ongoing

## 2021-04-10 NOTE — PROGRESS NOTES
Occupational Therapy  Facility/Department: [address] MED SURG  Daily Treatment Note  This note to serve as discharge summary if pt d/c'd prior to next session    NAME: Davian Denton  : 1940  MRN: [id number]    Date of Service: 4/10/2021    Discharge Recommendations:  Continue to assess pending progress       Assessment   Performance deficits / Impairments: Decreased functional mobility ; Decreased strength;Decreased ADL status; Decreased endurance;Decreased balance;Decreased high-level IADLs;Decreased safe awareness  Assessment: Pt tolerated session fair, easily SOB with all tasks and limited by pain, decreased activity tolerance. Pt required up to [address] for sit><stands and amb short distance with RW. Pt required Mod A for toileting task. Pt will benefit from cont OT to maximize function. Will cont with poc as able (Pt with order for IR to eval for possible kyphoplasty surgery). Prognosis: Good  OT Education: OT Role;Plan of Care;Transfer Training;Energy Conservation; ADL Adaptive Strategies  REQUIRES OT FOLLOW UP: Yes  Activity Tolerance  Activity Tolerance: Patient limited by pain; Patient limited by fatigue  Activity Tolerance: back pain. Easily SOB with tasks. Safety Devices  Safety Devices in place: Yes  Type of devices: Left in bed;Bed alarm in place;Call light within reach;Nurse notified;Gait belt; All fall risk precautions in place         Patient Diagnosis(es): The primary encounter diagnosis was COPD exacerbation ([facility] [address] Diagnoses of Hypoxia, Shortness of breath, and Fatigue, unspecified type were also pertinent to this visit. has a past medical history of HTN (hypertension), Hypercholesterolemia, MAC (mycobacterium avium-intracellulare complex), Osteoarthritis, Osteopetrosis, Papanicolaou smear of cervix with atypical squamous cells of undetermined significance (ASC-US), Shingles, and Stress incontinence. has a past surgical history that includes Cholecystectomy; Breast surgery;  Cataract removal (Left, 2009); Inguinal hernia repair (Right, 2015); Spine surgery (N/A, 11/25/2020); and CT BIOPSY BONE MARROW (2/12/2021). Restrictions  Restrictions/Precautions  Restrictions/Precautions: Fall Risk  Position Activity Restriction  Other position/activity restrictions: 2L O2; IV  Subjective   General  Chart Reviewed: Yes, Orders, Progress Notes, Labs  Patient assessed for rehabilitation services?: Yes  Additional Pertinent Hx: per ED note, Shaniqua Dumont is a 78 y.o. female with medical history of asthma, hypertension, tobacco abuse, COPD, renal insufficiency, cataracts, hypercholesterolemia, stress incontinence, macular degeneration, psoriasis, OA, IBS, pulmonary nodule right, frequent PVCs, adjustment disorder with anxious mood, sciatica, osteoporosis, colon polyp, venous insufficiency of bilateral lower extremities, uterovaginal prolapse, inguinal hernia, chronic constipation, compression fracture of T7, senile osteoporosis, shingles, cholecystectomy who presents the ED with complaints of aggressive weakness and shortness of air that is been ongoing for the past few days. Patient does live in an apartment home alone and states usually the family has been coming over to help take care of her, bathe her, cook for work, and drop-off groceries. However the daughter at bedside said they have recently been released from a Covid quarantine and the patient has had limited assistance from the family members. States that she does live home alone does not wear oxygen. EMS had been called numerous times over the past few days for patient's weakness and shortness of breath. When they arrived today her oxygen level was 88% on room air. Patient does note she has had decreased p.o. intake due to the inability to cook and prepare her food for. States that she has mostly been living off prepackaged snacks and cookies. Family is concerned as she has noticed a decreased activity and weight loss.   Patient states that she is unable to effectively care for herself at home anymore. She denies any recent trauma, accidents, head injuries, or falls. She denies being anticoagulated. She does note to smoking approximately half pack per day since 2015. Patient does note that she has been tapering down and believes she last smoked approximately 1 month ago. Patient's family said this is due to because no one was bringing her cigarettes. Is unsure if patient has been compliant with her medication due to her increased fatigue and weakness. Said she gets very fatigued just even getting up off of the couch to walk over to the bathroom. Family states that she has been sleeping hunched over in the couch throughout the day. Family / Caregiver Present: No  Referring Practitioner: ALYSIA Agustin - CNP  Subjective  Subjective: Pt on BSC, with RN, PCA present. Pt agreeable to OT. Pt reporting R back pain, not rated. General Comment  Comments: okay for therapy per RN. Pt with order in chart for IR to evaluate for kyphoplasy. Orientation  Orientation  Overall Orientation Status: Within Functional Limits  Objective    ADL  Toileting: Contact guard assistance;Minimal assistance(Pt voided urine. Pt sat to complete hygiene. Assist to manage briefs over hips)  Additional Comments: Anticipate pt would need overall Max A for ADL's. Standing Balance  Time: 1-2 min  Activity: ADL's, functional mob between transfers with RW, up to [address]. Pt easily SOB with exertion. (Pt's 02 sats WNL on 2L-taken after returned to bed).   Toilet Transfers  Toilet - Technique: Ambulating  Equipment Used: Standard bedside commode  Toilet Transfer: Contact guard assistance;Minimal assistance  Toilet Transfers Comments: RW, cues for hand placement  Wheelchair Bed Transfers  Wheelchair/Bed - Technique: Ambulating  Equipment Used: Bed(recliner, to bed)  Level of Asssistance: Minimal assistance  Wheelchair Transfers Comments: RW, cues and slightly more effort to stand off recliner, vs BSC. Bed mobility  Supine to Sit: Unable to assess(OOB at start of tx)  Sit to Supine:  Moderate assistance;Minimal assistance(LE's, bed flat and no rail)         Cognition  Overall Cognitive Status: Exceptions  Safety Judgement: Decreased awareness of need for assistance  Insights: Decreased awareness of deficits      Plan   Plan  Times per week: 3-5x  Current Treatment Recommendations: Strengthening, Functional Mobility Training, Gait Training, Endurance Training, Balance Training, Self-Care / ADL, Safety Education & Training, Patient/Caregiver Education & Training, Equipment Evaluation, Education, & procurement    AM-PAC Score        AM-PAC Inpatient Daily Activity Raw Score: 15 (04/10/21 1417)  AM-PAC Inpatient ADL T-Scale Score : 34.69 (04/10/21 1417)  ADL Inpatient CMS 0-100% Score: 56.46 (04/10/21 1417)  ADL Inpatient CMS G-Code Modifier : CK (04/10/21 1417)    Goals  Short term goals  Time Frame for Short term goals: prior to D/C: All goals ongoing  Short term goal 1: complete functional mobility and transfers Mod Ind  Short term goal 2: complete bathing and dressing Mod Ind  Short term goal 3: complete toileting Mod Ind  Short term goal 4: complete grooming at sink Mod Ind  Long term goals  Time Frame for Long term goals : STG=LTG  Patient Goals   Patient goals : return home       Therapy Time   Individual Concurrent Group Co-treatment   Time In 1349         Time Out 1413         Minutes 24              Marino LOVELL/GREGORY,[id number]

## 2021-04-10 NOTE — PLAN OF CARE
Ongoing     Problem: Safety:  Goal: Free from accidental physical injury  Description: Free from accidental physical injury  4/10/2021 1104 by Nando Branch RN  Outcome: Ongoing  4/10/2021 0103 by Maciel Costello RN  Outcome: Ongoing  Goal: Free from intentional harm  Description: Free from intentional harm  4/10/2021 1104 by Nando Branch RN  Outcome: Ongoing  4/10/2021 0103 by Maciel Costello RN  Outcome: Ongoing     Problem: Daily Care:  Goal: Daily care needs are met  Description: Daily care needs are met  4/10/2021 1104 by Nando Branch RN  Outcome: Ongoing  4/10/2021 0103 by Maciel Costello RN  Outcome: Ongoing     Problem: Discharge Planning:  Goal: Patients continuum of care needs are met  Description: Patients continuum of care needs are met  4/10/2021 1104 by Nando Branch RN  Outcome: Ongoing  4/10/2021 0103 by Maciel Costello RN  Outcome: Ongoing     Problem: Nutrition  Goal: Optimal nutrition therapy  4/10/2021 1104 by Nando Branch RN  Outcome: Ongoing  4/10/2021 0103 by Maciel Costello RN  Outcome: Ongoing

## 2021-04-10 NOTE — PROGRESS NOTES
bilaterally. Full range of motion without deformity. Skin: Skin color, texture, turgor normal.  No rashes or lesions. Neurologic:  Neurovascularly intact without any focal sensory/motor deficits. Cranial nerves: II-XII intact, grossly non-focal.  Psychiatric: Alert and oriented, thought content appropriate, normal insight  Capillary Refill: Brisk,< 3 seconds   Peripheral Pulses: +2 palpable, equal bilaterally       Labs:   Recent Labs     04/09/21 0914   WBC 9.2   HGB 9.8*   HCT 30.1*        Recent Labs     04/09/21 0914      K 4.6      CO2 24   BUN 31*   CREATININE 0.6   CALCIUM 8.1*   PHOS 1.9*     No results for input(s): AST, ALT, BILIDIR, BILITOT, ALKPHOS in the last 72 hours. No results for input(s): INR in the last 72 hours. No results for input(s): Nicole Ends in the last 72 hours. Assessment/Plan:    -COPD exacerbation. Worse 4/9 - resumed solumedrol and added 3% saline nebs   Abx Unasyn IV. -Acute respiratory failure with hypoxia due to COPD exacerbation. Up to 4l/min today - see above. Dysphagia. MBS is normal - no aspiration. Pt agreed to have esophagram done. - 3/6 - this shows esophageal dysmotility with spasms, possibly Schatzki Ring.    - she is already on CCB. - she will likely need EGD to investigate/treat this further.    - she is struggling with solid food, though wants to eat and feels hungry.    - noted GI recs - cont PO intake and SLP. OP EGD if symptoms persist once COPD better. Back Pain   Hx of T7 compression fracture s/p kyphoplasty in the past.   CT this admit - new T9 compression fracture. Cont pain control. Will ask IR to eval for kyphoplasty. -Generalized weakness due to COPD exacerbation--PT and OT was consulted-disposition to SNF versus home health-  Likely [facility] on discharge.         -Essential hypertension stable-continue diltiazem, metoprolol.   Held Lasix and HCTZ due to RASHAWN        -RASHAWN--creatinine up to 1.6 from baseline of 1.0.. Likely prerenal related to relative hypotension, NSAIDs and diuretic use. .. Stopped diuretics and NSAIDs. . Started IV fluids. . Avoid hypotension        DVT Prophylaxis: Lovenox  Diet: DIET GENERAL;  Dietary Nutrition Supplements: Standard High Calorie Oral Supplement  Dietary Nutrition Supplements: Clear Liquid Oral Supplement  Code Status: Full Code        Stephy Hurst MD

## 2021-04-11 PROCEDURE — 6360000002 HC RX W HCPCS: Performed by: INTERNAL MEDICINE

## 2021-04-11 PROCEDURE — 6370000000 HC RX 637 (ALT 250 FOR IP): Performed by: NURSE PRACTITIONER

## 2021-04-11 PROCEDURE — 6360000002 HC RX W HCPCS: Performed by: NURSE PRACTITIONER

## 2021-04-11 PROCEDURE — 94760 N-INVAS EAR/PLS OXIMETRY 1: CPT

## 2021-04-11 PROCEDURE — 94640 AIRWAY INHALATION TREATMENT: CPT

## 2021-04-11 PROCEDURE — 2580000003 HC RX 258: Performed by: NURSE PRACTITIONER

## 2021-04-11 PROCEDURE — 2580000003 HC RX 258: Performed by: INTERNAL MEDICINE

## 2021-04-11 PROCEDURE — 6370000000 HC RX 637 (ALT 250 FOR IP): Performed by: INTERNAL MEDICINE

## 2021-04-11 PROCEDURE — 1200000000 HC SEMI PRIVATE

## 2021-04-11 RX ORDER — METHYLPREDNISOLONE SODIUM SUCCINATE 40 MG/ML
40 INJECTION, POWDER, LYOPHILIZED, FOR SOLUTION INTRAMUSCULAR; INTRAVENOUS DAILY
Status: DISCONTINUED | OUTPATIENT
Start: 2021-04-12 | End: 2021-04-15 | Stop reason: HOSPADM

## 2021-04-11 RX ADMIN — DILTIAZEM HYDROCHLORIDE 120 MG: 120 CAPSULE, COATED, EXTENDED RELEASE ORAL at 08:41

## 2021-04-11 RX ADMIN — SODIUM CHLORIDE, PRESERVATIVE FREE 10 ML: 5 INJECTION INTRAVENOUS at 21:24

## 2021-04-11 RX ADMIN — ASPIRIN 81 MG: 81 TABLET, COATED ORAL at 08:41

## 2021-04-11 RX ADMIN — OYSTER SHELL CALCIUM WITH VITAMIN D 2 TABLET: 500; 200 TABLET, FILM COATED ORAL at 21:19

## 2021-04-11 RX ADMIN — BUDESONIDE AND FORMOTEROL FUMARATE DIHYDRATE 2 PUFF: 160; 4.5 AEROSOL RESPIRATORY (INHALATION) at 08:24

## 2021-04-11 RX ADMIN — OXYCODONE AND ACETAMINOPHEN 1 TABLET: 10; 325 TABLET ORAL at 21:19

## 2021-04-11 RX ADMIN — IPRATROPIUM BROMIDE AND ALBUTEROL SULFATE 1 AMPULE: .5; 3 SOLUTION RESPIRATORY (INHALATION) at 11:50

## 2021-04-11 RX ADMIN — OXYCODONE AND ACETAMINOPHEN 1 TABLET: 10; 325 TABLET ORAL at 12:06

## 2021-04-11 RX ADMIN — METHYLPREDNISOLONE SODIUM SUCCINATE 40 MG: 40 INJECTION, POWDER, FOR SOLUTION INTRAMUSCULAR; INTRAVENOUS at 01:01

## 2021-04-11 RX ADMIN — SODIUM CHLORIDE 1500 MG: 900 INJECTION INTRAVENOUS at 12:06

## 2021-04-11 RX ADMIN — OYSTER SHELL CALCIUM WITH VITAMIN D 2 TABLET: 500; 200 TABLET, FILM COATED ORAL at 08:41

## 2021-04-11 RX ADMIN — IPRATROPIUM BROMIDE AND ALBUTEROL SULFATE 1 AMPULE: .5; 3 SOLUTION RESPIRATORY (INHALATION) at 08:24

## 2021-04-11 RX ADMIN — METOPROLOL SUCCINATE 50 MG: 50 TABLET, EXTENDED RELEASE ORAL at 08:41

## 2021-04-11 RX ADMIN — SODIUM CHLORIDE 1500 MG: 900 INJECTION INTRAVENOUS at 05:29

## 2021-04-11 RX ADMIN — IPRATROPIUM BROMIDE AND ALBUTEROL SULFATE 1 AMPULE: .5; 3 SOLUTION RESPIRATORY (INHALATION) at 15:52

## 2021-04-11 RX ADMIN — SODIUM CHLORIDE 1500 MG: 900 INJECTION INTRAVENOUS at 01:01

## 2021-04-11 RX ADMIN — IPRATROPIUM BROMIDE AND ALBUTEROL SULFATE 1 AMPULE: .5; 3 SOLUTION RESPIRATORY (INHALATION) at 20:38

## 2021-04-11 RX ADMIN — SODIUM CHLORIDE 1500 MG: 900 INJECTION INTRAVENOUS at 18:11

## 2021-04-11 RX ADMIN — DILTIAZEM HYDROCHLORIDE 120 MG: 120 CAPSULE, COATED, EXTENDED RELEASE ORAL at 21:19

## 2021-04-11 RX ADMIN — ENOXAPARIN SODIUM 40 MG: 40 INJECTION SUBCUTANEOUS at 08:41

## 2021-04-11 RX ADMIN — BUDESONIDE AND FORMOTEROL FUMARATE DIHYDRATE 2 PUFF: 160; 4.5 AEROSOL RESPIRATORY (INHALATION) at 20:39

## 2021-04-11 RX ADMIN — HYDROCODONE BITARTRATE AND ACETAMINOPHEN 1 TABLET: 5; 325 TABLET ORAL at 18:16

## 2021-04-11 RX ADMIN — BUPROPION HYDROCHLORIDE 150 MG: 150 TABLET, EXTENDED RELEASE ORAL at 08:41

## 2021-04-11 ASSESSMENT — PAIN DESCRIPTION - PAIN TYPE: TYPE: CHRONIC PAIN

## 2021-04-11 ASSESSMENT — PAIN SCALES - GENERAL
PAINLEVEL_OUTOF10: 10
PAINLEVEL_OUTOF10: 5
PAINLEVEL_OUTOF10: 10

## 2021-04-11 ASSESSMENT — PAIN DESCRIPTION - LOCATION
LOCATION: BACK
LOCATION: BACK

## 2021-04-11 ASSESSMENT — PAIN DESCRIPTION - ORIENTATION: ORIENTATION: LOWER

## 2021-04-11 ASSESSMENT — PAIN DESCRIPTION - FREQUENCY: FREQUENCY: CONTINUOUS

## 2021-04-11 NOTE — PLAN OF CARE
Problem: Pain:  Goal: Pain level will decrease  Description: Pain level will decrease  4/11/2021 0923 by Ruben Vincent RN  Outcome: Ongoing  4/11/2021 0009 by Best Peterson RN  Outcome: Ongoing  Goal: Control of acute pain  Description: Control of acute pain  4/11/2021 0923 by Ruben Vincent RN  Outcome: Ongoing  4/11/2021 0009 by Best Peterson RN  Outcome: Ongoing  Goal: Control of chronic pain  Description: Control of chronic pain  4/11/2021 0923 by Ruben Vincent RN  Outcome: Ongoing  4/11/2021 0009 by Best Peterson RN  Outcome: Ongoing     Problem: Falls - Risk of:  Goal: Will remain free from falls  Description: Will remain free from falls  4/11/2021 0923 by Ruben Vincent RN  Outcome: Ongoing  4/11/2021 0009 by Best Peterson RN  Outcome: Ongoing  Goal: Absence of physical injury  Description: Absence of physical injury  4/11/2021 0923 by Ruben Vincent RN  Outcome: Ongoing  4/11/2021 0009 by Best Peterson RN  Outcome: Ongoing     Problem: Skin Integrity:  Goal: Will show no infection signs and symptoms  Description: Will show no infection signs and symptoms  4/11/2021 0923 by Ruben Vincent RN  Outcome: Ongoing  4/11/2021 0009 by Best Peterson RN  Outcome: Ongoing  Goal: Absence of new skin breakdown  Description: Absence of new skin breakdown  4/11/2021 0923 by Ruben Vincent RN  Outcome: Ongoing  4/11/2021 0009 by Best Peterson RN  Outcome: Ongoing     Problem: Breathing Pattern - Ineffective:  Goal: Ability to achieve and maintain a regular respiratory rate will improve  Description: Ability to achieve and maintain a regular respiratory rate will improve  4/11/2021 0923 by Ruben Vincent RN  Outcome: Ongoing  4/11/2021 0009 by Best Peterson RN  Outcome: Ongoing     Problem: Infection:  Goal: Will remain free from infection  Description: Will remain free from infection  4/11/2021 0923 by Ruben Vincent RN  Outcome: Ongoing  4/11/2021 0009 by Best Peterson RN  Outcome:

## 2021-04-11 NOTE — PROGRESS NOTES
Report received from [facility]. Respiratory therapist in room and stating that pt has crackles in lungs and that breathing is more labored as compared to this morning. Pt laying on left side in bed, respirations appear labored. Pt states pain level 10/10. PRN pain medication given and MD notified via secure message. Will continue to monitor.

## 2021-04-11 NOTE — PLAN OF CARE
Problem: Pain:  Goal: Pain level will decrease  Description: Pain level will decrease  4/11/2021 0009 by Messi Edmond RN  Outcome: Ongoing     Problem: Pain:  Goal: Control of acute pain  Description: Control of acute pain  4/11/2021 0009 by Messi Edmond RN  Outcome: Ongoing     Problem: Pain:  Goal: Control of chronic pain  Description: Control of chronic pain  4/11/2021 0009 by Messi Edmond RN  Outcome: Ongoing     Problem: Falls - Risk of:  Goal: Will remain free from falls  Description: Will remain free from falls  4/11/2021 0009 by Messi Edmond RN  Outcome: Ongoing     Problem: Falls - Risk of:  Goal: Absence of physical injury  Description: Absence of physical injury  4/11/2021 0009 by Messi Edmond RN  Outcome: Ongoing     Problem: Skin Integrity:  Goal: Will show no infection signs and symptoms  Description: Will show no infection signs and symptoms  4/11/2021 0009 by Messi Edmond RN  Outcome: Ongoing     Problem: Skin Integrity:  Goal: Absence of new skin breakdown  Description: Absence of new skin breakdown  4/11/2021 0009 by Messi Edmond RN  Outcome: Ongoing     Problem: Breathing Pattern - Ineffective:  Goal: Ability to achieve and maintain a regular respiratory rate will improve  Description: Ability to achieve and maintain a regular respiratory rate will improve  4/11/2021 0009 by Messi Edmond RN  Outcome: Ongoing     Problem: Infection:  Goal: Will remain free from infection  Description: Will remain free from infection  4/11/2021 0009 by Messi Edmond RN  Outcome: Ongoing     Problem: Safety:  Goal: Free from accidental physical injury  Description: Free from accidental physical injury  4/11/2021 0009 by Messi Edmond RN  Outcome: Ongoing     Problem: Safety:  Goal: Free from intentional harm  Description: Free from intentional harm  4/11/2021 0009 by Messi Edmond RN  Outcome: Ongoing     Problem: Daily Care:  Goal: Daily care needs are met  Description: Daily care needs are met  4/11/2021 0009 by Jessica Evangelista RN  Outcome: Ongoing     Problem: Discharge Planning:  Goal: Patients continuum of care needs are met  Description: Patients continuum of care needs are met  4/11/2021 0009 by Jessica Evangelista RN  Outcome: Ongoing     Problem: Nutrition  Goal: Optimal nutrition therapy  4/11/2021 0009 by Jessica Evangelista RN  Outcome: Ongoing

## 2021-04-12 LAB
ALBUMIN SERPL-MCNC: 2.9 G/DL (ref 3.4–5)
ANION GAP SERPL CALCULATED.3IONS-SCNC: 7 MMOL/L (ref 3–16)
BASOPHILS ABSOLUTE: 0 K/UL (ref 0–0.2)
BASOPHILS RELATIVE PERCENT: 0.1 %
BUN BLDV-MCNC: 30 MG/DL (ref 7–20)
CALCIUM SERPL-MCNC: 9.4 MG/DL (ref 8.3–10.6)
CHLORIDE BLD-SCNC: 106 MMOL/L (ref 99–110)
CO2: 30 MMOL/L (ref 21–32)
CREAT SERPL-MCNC: 0.7 MG/DL (ref 0.6–1.2)
EOSINOPHILS ABSOLUTE: 0 K/UL (ref 0–0.6)
EOSINOPHILS RELATIVE PERCENT: 0 %
GFR AFRICAN AMERICAN: >60
GFR NON-AFRICAN AMERICAN: >60
GLUCOSE BLD-MCNC: 96 MG/DL (ref 70–99)
HCT VFR BLD CALC: 30.4 % (ref 36–48)
HEMOGLOBIN: 10 G/DL (ref 12–16)
LYMPHOCYTES ABSOLUTE: 0.4 K/UL (ref 1–5.1)
LYMPHOCYTES RELATIVE PERCENT: 3.5 %
MCH RBC QN AUTO: 30.7 PG (ref 26–34)
MCHC RBC AUTO-ENTMCNC: 33 G/DL (ref 31–36)
MCV RBC AUTO: 93.2 FL (ref 80–100)
MONOCYTES ABSOLUTE: 1.1 K/UL (ref 0–1.3)
MONOCYTES RELATIVE PERCENT: 10.6 %
NEUTROPHILS ABSOLUTE: 9.1 K/UL (ref 1.7–7.7)
NEUTROPHILS RELATIVE PERCENT: 85.8 %
PDW BLD-RTO: 15.7 % (ref 12.4–15.4)
PHOSPHORUS: 1.6 MG/DL (ref 2.5–4.9)
PLATELET # BLD: 217 K/UL (ref 135–450)
PMV BLD AUTO: 8.4 FL (ref 5–10.5)
POTASSIUM SERPL-SCNC: 4.7 MMOL/L (ref 3.5–5.1)
RBC # BLD: 3.26 M/UL (ref 4–5.2)
SODIUM BLD-SCNC: 143 MMOL/L (ref 136–145)
WBC # BLD: 10.6 K/UL (ref 4–11)

## 2021-04-12 PROCEDURE — 1200000000 HC SEMI PRIVATE

## 2021-04-12 PROCEDURE — 85025 COMPLETE CBC W/AUTO DIFF WBC: CPT

## 2021-04-12 PROCEDURE — 6370000000 HC RX 637 (ALT 250 FOR IP): Performed by: NURSE PRACTITIONER

## 2021-04-12 PROCEDURE — 94640 AIRWAY INHALATION TREATMENT: CPT

## 2021-04-12 PROCEDURE — 6360000002 HC RX W HCPCS: Performed by: INTERNAL MEDICINE

## 2021-04-12 PROCEDURE — 97530 THERAPEUTIC ACTIVITIES: CPT

## 2021-04-12 PROCEDURE — 2700000000 HC OXYGEN THERAPY PER DAY

## 2021-04-12 PROCEDURE — 2580000003 HC RX 258: Performed by: NURSE PRACTITIONER

## 2021-04-12 PROCEDURE — 2580000003 HC RX 258: Performed by: INTERNAL MEDICINE

## 2021-04-12 PROCEDURE — 97535 SELF CARE MNGMENT TRAINING: CPT

## 2021-04-12 PROCEDURE — 6360000002 HC RX W HCPCS: Performed by: NURSE PRACTITIONER

## 2021-04-12 PROCEDURE — 97116 GAIT TRAINING THERAPY: CPT

## 2021-04-12 PROCEDURE — 36415 COLL VENOUS BLD VENIPUNCTURE: CPT

## 2021-04-12 PROCEDURE — 80069 RENAL FUNCTION PANEL: CPT

## 2021-04-12 PROCEDURE — 6370000000 HC RX 637 (ALT 250 FOR IP): Performed by: INTERNAL MEDICINE

## 2021-04-12 PROCEDURE — 94761 N-INVAS EAR/PLS OXIMETRY MLT: CPT

## 2021-04-12 PROCEDURE — 97110 THERAPEUTIC EXERCISES: CPT

## 2021-04-12 RX ADMIN — OXYCODONE AND ACETAMINOPHEN 1 TABLET: 10; 325 TABLET ORAL at 10:38

## 2021-04-12 RX ADMIN — ASPIRIN 81 MG: 81 TABLET, COATED ORAL at 10:38

## 2021-04-12 RX ADMIN — BUDESONIDE AND FORMOTEROL FUMARATE DIHYDRATE 2 PUFF: 160; 4.5 AEROSOL RESPIRATORY (INHALATION) at 08:46

## 2021-04-12 RX ADMIN — OYSTER SHELL CALCIUM WITH VITAMIN D 2 TABLET: 500; 200 TABLET, FILM COATED ORAL at 21:20

## 2021-04-12 RX ADMIN — IPRATROPIUM BROMIDE AND ALBUTEROL SULFATE 1 AMPULE: .5; 3 SOLUTION RESPIRATORY (INHALATION) at 16:07

## 2021-04-12 RX ADMIN — IPRATROPIUM BROMIDE AND ALBUTEROL SULFATE 1 AMPULE: .5; 3 SOLUTION RESPIRATORY (INHALATION) at 20:56

## 2021-04-12 RX ADMIN — SODIUM CHLORIDE 1500 MG: 900 INJECTION INTRAVENOUS at 13:00

## 2021-04-12 RX ADMIN — SODIUM CHLORIDE 1500 MG: 900 INJECTION INTRAVENOUS at 01:12

## 2021-04-12 RX ADMIN — HYDROCODONE BITARTRATE AND ACETAMINOPHEN 1 TABLET: 5; 325 TABLET ORAL at 13:00

## 2021-04-12 RX ADMIN — SODIUM CHLORIDE, PRESERVATIVE FREE 10 ML: 5 INJECTION INTRAVENOUS at 21:33

## 2021-04-12 RX ADMIN — OXYCODONE AND ACETAMINOPHEN 1 TABLET: 10; 325 TABLET ORAL at 18:12

## 2021-04-12 RX ADMIN — HYDROCODONE BITARTRATE AND ACETAMINOPHEN 1 TABLET: 5; 325 TABLET ORAL at 21:19

## 2021-04-12 RX ADMIN — ENOXAPARIN SODIUM 40 MG: 40 INJECTION SUBCUTANEOUS at 10:39

## 2021-04-12 RX ADMIN — BUDESONIDE AND FORMOTEROL FUMARATE DIHYDRATE 2 PUFF: 160; 4.5 AEROSOL RESPIRATORY (INHALATION) at 20:56

## 2021-04-12 RX ADMIN — METOPROLOL SUCCINATE 50 MG: 50 TABLET, EXTENDED RELEASE ORAL at 10:38

## 2021-04-12 RX ADMIN — DILTIAZEM HYDROCHLORIDE 120 MG: 120 CAPSULE, COATED, EXTENDED RELEASE ORAL at 10:38

## 2021-04-12 RX ADMIN — IPRATROPIUM BROMIDE AND ALBUTEROL SULFATE 1 AMPULE: .5; 3 SOLUTION RESPIRATORY (INHALATION) at 08:36

## 2021-04-12 RX ADMIN — SODIUM CHLORIDE 1500 MG: 900 INJECTION INTRAVENOUS at 18:12

## 2021-04-12 RX ADMIN — BUPROPION HYDROCHLORIDE 150 MG: 150 TABLET, EXTENDED RELEASE ORAL at 10:49

## 2021-04-12 RX ADMIN — IPRATROPIUM BROMIDE AND ALBUTEROL SULFATE 1 AMPULE: .5; 3 SOLUTION RESPIRATORY (INHALATION) at 11:55

## 2021-04-12 RX ADMIN — METHYLPREDNISOLONE SODIUM SUCCINATE 40 MG: 40 INJECTION, POWDER, FOR SOLUTION INTRAMUSCULAR; INTRAVENOUS at 10:39

## 2021-04-12 RX ADMIN — OYSTER SHELL CALCIUM WITH VITAMIN D 2 TABLET: 500; 200 TABLET, FILM COATED ORAL at 10:38

## 2021-04-12 RX ADMIN — SODIUM CHLORIDE, PRESERVATIVE FREE 10 ML: 5 INJECTION INTRAVENOUS at 10:39

## 2021-04-12 RX ADMIN — SODIUM CHLORIDE 1500 MG: 900 INJECTION INTRAVENOUS at 06:12

## 2021-04-12 RX ADMIN — DILTIAZEM HYDROCHLORIDE 120 MG: 120 CAPSULE, COATED, EXTENDED RELEASE ORAL at 21:20

## 2021-04-12 ASSESSMENT — PAIN SCALES - GENERAL
PAINLEVEL_OUTOF10: 0
PAINLEVEL_OUTOF10: 10
PAINLEVEL_OUTOF10: 9
PAINLEVEL_OUTOF10: 8
PAINLEVEL_OUTOF10: 10
PAINLEVEL_OUTOF10: 9

## 2021-04-12 ASSESSMENT — PAIN - FUNCTIONAL ASSESSMENT: PAIN_FUNCTIONAL_ASSESSMENT: PREVENTS OR INTERFERES SOME ACTIVE ACTIVITIES AND ADLS

## 2021-04-12 ASSESSMENT — PAIN DESCRIPTION - ORIENTATION: ORIENTATION: LOWER

## 2021-04-12 ASSESSMENT — PAIN DESCRIPTION - ONSET: ONSET: ON-GOING

## 2021-04-12 ASSESSMENT — PAIN DESCRIPTION - DESCRIPTORS: DESCRIPTORS: CONSTANT

## 2021-04-12 ASSESSMENT — PAIN DESCRIPTION - PROGRESSION
CLINICAL_PROGRESSION: NOT CHANGED

## 2021-04-12 ASSESSMENT — PAIN DESCRIPTION - FREQUENCY
FREQUENCY: CONTINUOUS
FREQUENCY: CONTINUOUS

## 2021-04-12 ASSESSMENT — PAIN DESCRIPTION - PAIN TYPE: TYPE: ACUTE PAIN

## 2021-04-12 ASSESSMENT — PAIN DESCRIPTION - LOCATION: LOCATION: BACK

## 2021-04-12 NOTE — PLAN OF CARE
Problem: Pain:  Goal: Pain level will decrease  Description: Pain level will decrease  4/12/2021 1155 by Janelle Lockett RN  Outcome: Ongoing  4/12/2021 0147 by Kareem Hayes RN  Outcome: Ongoing  Goal: Control of acute pain  Description: Control of acute pain  4/12/2021 1155 by Janelle Lockett RN  Outcome: Ongoing  4/12/2021 0147 by Kareem Hayes RN  Outcome: Ongoing  Goal: Control of chronic pain  Description: Control of chronic pain  4/12/2021 1155 by Janelle Lockett RN  Outcome: Ongoing  4/12/2021 0147 by Kareem Hayes RN  Outcome: Ongoing     Problem: Falls - Risk of:  Goal: Will remain free from falls  Description: Will remain free from falls  4/12/2021 1155 by Janelle Lockett RN  Outcome: Ongoing  4/12/2021 0147 by Kareem Hayes RN  Outcome: Ongoing  Goal: Absence of physical injury  Description: Absence of physical injury  4/12/2021 1155 by Janelle Lockett RN  Outcome: Ongoing  4/12/2021 0147 by Kareem Hayes RN  Outcome: Ongoing     Problem: Infection:  Goal: Will remain free from infection  Description: Will remain free from infection  4/12/2021 1155 by Janelle Lockett RN  Outcome: Ongoing  4/12/2021 0147 by Kareem Hayes RN  Outcome: Ongoing     Problem: Safety:  Goal: Free from accidental physical injury  Description: Free from accidental physical injury  4/12/2021 1155 by Janelle Lockett RN  Outcome: Ongoing  4/12/2021 0147 by Kareem Hayes RN  Outcome: Ongoing  Goal: Free from intentional harm  Description: Free from intentional harm  4/12/2021 1155 by Janelle Lockett RN  Outcome: Ongoing  4/12/2021 0147 by Kareem Hayes RN  Outcome: Ongoing     Problem: Breathing Pattern - Ineffective:  Goal: Ability to achieve and maintain a regular respiratory rate will improve  Description: Ability to achieve and maintain a regular respiratory rate will improve  4/12/2021 1155 by Janelle Lockett RN  Outcome: Ongoing  4/12/2021 0147 by Kareem Hayes RN  Outcome: Ongoing     Problem: Daily Care:  Goal: Daily care needs are met  Description: Daily care needs are met  4/12/2021 1155 by Nadine Self RN  Outcome: Ongoing  4/12/2021 0147 by Ian Ward RN  Outcome: Ongoing

## 2021-04-12 NOTE — PROGRESS NOTES
Patient is alert and oriented X4. Patient moved from chair to bed. Patient moved with walker. Patient tolerated well.

## 2021-04-12 NOTE — PROGRESS NOTES
distress, appears stated age and cooperative. HEENT: Pupils equal, round, and reactive to light. Conjunctivae/corneas clear. Neck: Supple, with full range of motion. No jugular venous distention. Trachea midline. Respiratory:  Normal respiratory effort. Clear to auscultation, bilaterally without Rales/Wheezes/Rhonchi. Cardiovascular: Regular rate and rhythm with normal S1/S2   Abdomen: Soft, non-tender, non-distended with normal bowel sounds. Musculoskeletal: No clubbing, cyanosis or edema bilaterally. Skin: Skin color, texture, turgor normal.  No rashes or lesions. Neurologic:  Neurovascularly intact without any focal sensory/motor deficits. Cranial nerves: II-XII intact, grossly non-focal.  Psychiatric: Alert and oriented, thought content appropriate, normal insight  Capillary Refill: Brisk,< 3 seconds   Peripheral Pulses: +2 palpable, equal bilaterally       Labs:   Recent Labs     04/12/21  0603   WBC 10.6   HGB 10.0*   HCT 30.4*        Recent Labs     04/12/21  0603      K 4.7      CO2 30   BUN 30*   CREATININE 0.7   CALCIUM 9.4   PHOS 1.6*     No results for input(s): AST, ALT, BILIDIR, BILITOT, ALKPHOS in the last 72 hours. No results for input(s): INR in the last 72 hours. No results for input(s): Esteban Ang in the last 72 hours. Urinalysis:      Lab Results   Component Value Date    NITRU Negative 04/03/2021    WBCUA 8 04/03/2021    BACTERIA 3+ 04/03/2021    RBCUA 1 04/03/2021    BLOODU Negative 04/03/2021    SPECGRAV 1.026 04/03/2021    GLUCOSEU Negative 04/03/2021       Radiology:  CT LUMBAR SPINE WO CONTRAST   Final Result   1. Negative for fracture   2. Lumbar degenerative disease         CT THORACIC SPINE WO CONTRAST   Final Result   1. New approximately 50% T9 compression fracture which appears acute/subacute   2.  Stable old T5 through T7 compression fractures, status post T7   vertebroplasty         XR CHEST PORTABLE   Final Result   Pulmonary venous hypertension with mild edema. Bibasilar airspace disease   may represent edema and/or atelectasis. There is left pleural effusion as   well         FL ESOPHAGRAM   Final Result   Esophageal dysmotility with tertiary contractions and spasms. Small hiatal hernia with focal persistent defect compatible with what appears   to be a Schatzki's ring. Evaluation is limited due to patient's ability to   position for exam.         Fluoroscopy modified barium swallow with video   Final Result   Swallowing mechanism grossly within normal limits without evidence of   aspiration. Please see separate speech pathology report for full discussion of findings   and recommendations. XR CHEST PORTABLE   Final Result   No acute disease.          IR KYPHOPLASTY LUMBAR 1 VERTEBRAL BODY    (Results Pending)           Assessment/Plan:    COPD exacerbation  Resume Solu-Medrol, 3% normal saline  Unasyn IV  Patient may be silently aspirating due to esophageal dysmotility and possible stricture    Acute respiratory failure with hypoxia  Secondary to COPD exacerbation  Down to 1 L    Dysphagia  MBS is normal with no signs of aspiration  Esophagram performed showing dysmotility with spasms, possible Schatzki ring  Already on calcium channel blocker  Will need EGD to investigate/treat this  Continues to have struggles with solid food  GI consulted, EGD if symptoms persist once COPD improved    Back pain  History of T7 compression fracture status post kyphoplasty  CT on this admission showing a new T9 compression fracture  Continue with pain control  MRI and possible IR evaluation for kyphoplasty    Generalized weakness  SNF versus home health at discharge  Patient would like [facility]    RASHAWN  Creatinine up to 1.6 from 1  Likely prerenal in the setting of hypotension and NSAIDs  Resolved      DVT Prophylaxis: Aspirin  Diet: DIET GENERAL;  Dietary Nutrition Supplements: Standard High Calorie Oral Supplement  Dietary Nutrition Supplements: Clear Liquid Oral Supplement  Code Status: Full Code    PT/OT Eval Status: 3-5 sessions per week    Dispo -inpatient, discharge to SNF status post kyphoplasty    Catia Morris MD

## 2021-04-12 NOTE — PROGRESS NOTES
Occupational Therapy  Facility/Department: [facility] MED SURG  Daily Treatment Note  NAME: Coleman Arriola  : 1940  MRN: [id number]    Date of Service: 2021     Coleman Arriola scored a 15/24 on the AM-PAC ADL Inpatient form. Current research shows that an AM-PAC score of 17 or less is typically not associated with a discharge to the patient's home setting. Based on the patient's AM-PAC score and their current ADL deficits, it is recommended that the patient have 3-5 sessions per week of Occupational Therapy at d/c to increase the patient's independence. Please see assessment section for further patient specific details. If patient discharges prior to next session this note will serve as a discharge summary. Please see below for the latest assessment towards goals. Discharge Recommendations:  Continue to assess pending progress, 3-5 sessions per week       Assessment   Performance deficits / Impairments: Decreased functional mobility ; Decreased strength;Decreased ADL status; Decreased endurance;Decreased balance;Decreased high-level IADLs;Decreased safe awareness  Assessment: Pt AMPAC score 16 and limited by the above defecits. Pt CGA/Min A for bed mobility with extended time due to pain and fatigue. Pt completed short functional mobility from EOB > recliner chair and from recliner chair to FOB and back with RW and Min A for safety and IV/O2 management. Pt steady gait with no overt LOB noted with max cues for safety and hand placement. Pt CGA for sit<>stand from EOB to RW and Min A from recliner chair to RW. Low AMPAC score indicated need for ongoing skilled OT services to increase endurance, safety awareness and independence with ADLs. Cont with POC. Patient Diagnosis(es): The primary encounter diagnosis was COPD exacerbation ([facility] [address] Diagnoses of Hypoxia, Shortness of breath, and Fatigue, unspecified type were also pertinent to this visit.       has a past medical history of HTN (hypertension), Hypercholesterolemia, MAC (mycobacterium avium-intracellulare complex), Osteoarthritis, Osteopetrosis, Papanicolaou smear of cervix with atypical squamous cells of undetermined significance (ASC-US), Shingles, and Stress incontinence. has a past surgical history that includes Cholecystectomy; Breast surgery; Cataract removal (Left, 2009); Inguinal hernia repair (Right, 2015); Spine surgery (N/A, 11/25/2020); and CT BIOPSY BONE MARROW (2/12/2021). Restrictions  Restrictions/Precautions  Restrictions/Precautions: Fall Risk  Position Activity Restriction  Other position/activity restrictions: 3L O2; IV  Subjective   General  Chart Reviewed: Yes, Orders, Progress Notes, Labs  Patient assessed for rehabilitation services?: Yes  Additional Pertinent Hx: per ED note, Ryne Holt is a 78 y.o. female with medical history of asthma, hypertension, tobacco abuse, COPD, renal insufficiency, cataracts, hypercholesterolemia, stress incontinence, macular degeneration, psoriasis, OA, IBS, pulmonary nodule right, frequent PVCs, adjustment disorder with anxious mood, sciatica, osteoporosis, colon polyp, venous insufficiency of bilateral lower extremities, uterovaginal prolapse, inguinal hernia, chronic constipation, compression fracture of T7, senile osteoporosis, shingles, cholecystectomy who presents the ED with complaints of aggressive weakness and shortness of air that is been ongoing for the past few days. Patient does live in an apartment home alone and states usually the family has been coming over to help take care of her, bathe her, cook for work, and drop-off groceries. However the daughter at bedside said they have recently been released from a Covid quarantine and the patient has had limited assistance from the family members. States that she does live home alone does not wear oxygen. EMS had been called numerous times over the past few days for patient's weakness and shortness of breath. When they arrived today her oxygen level was 88% on room air. Patient does note she has had decreased p.o. intake due to the inability to cook and prepare her food for. States that she has mostly been living off prepackaged snacks and cookies. Family is concerned as she has noticed a decreased activity and weight loss. Patient states that she is unable to effectively care for herself at home anymore. She denies any recent trauma, accidents, head injuries, or falls. She denies being anticoagulated. She does note to smoking approximately half pack per day since 2015. Patient does note that she has been tapering down and believes she last smoked approximately 1 month ago. Patient's family said this is due to because no one was bringing her cigarettes. Is unsure if patient has been compliant with her medication due to her increased fatigue and weakness. Said she gets very fatigued just even getting up off of the couch to walk over to the bathroom. Family states that she has been sleeping hunched over in the couch throughout the day. Family / Caregiver Present: No  Referring Practitioner: ALYSIA Wallace - CNP  Subjective  Subjective: Pt supine in bed upon arrival with PT. Pt agreeable to OOB task with encouragement. Hesitant due to pain. General Comment  Comments: okay for therapy per RN. Pt with order in chart for IR to evaluate for kyphoplasy. Orientation  Orientation  Overall Orientation Status: Impaired  Orientation Level: Disoriented to time;Oriented to person  Objective    ADL  Feeding: Setup(Setup to drink coffee from recliner)  Grooming: Moderate assistance;Maximum assistance(Mod/Max A to brush hair from recliner)  Additional Comments: Pt declined further ADL needs at this time.         Balance  Sitting Balance: Stand by assistance  Standing Balance: Contact guard assistance  Standing Balance  Time: 1-2 min  Activity: Tranfers, functional mobility with RW  Functional Mobility  Functional

## 2021-04-12 NOTE — PLAN OF CARE
Problem: Pain:  Goal: Pain level will decrease  Description: Pain level will decrease  Outcome: Ongoing  Goal: Control of acute pain  Description: Control of acute pain  Outcome: Ongoing  Goal: Control of chronic pain  Description: Control of chronic pain  Outcome: Ongoing     Problem: Falls - Risk of:  Goal: Will remain free from falls  Description: Will remain free from falls  Outcome: Ongoing  Goal: Absence of physical injury  Description: Absence of physical injury  Outcome: Ongoing     Problem: Skin Integrity:  Goal: Will show no infection signs and symptoms  Description: Will show no infection signs and symptoms  Outcome: Ongoing  Goal: Absence of new skin breakdown  Description: Absence of new skin breakdown  Outcome: Ongoing     Problem: Breathing Pattern - Ineffective:  Goal: Ability to achieve and maintain a regular respiratory rate will improve  Description: Ability to achieve and maintain a regular respiratory rate will improve  Outcome: Ongoing     Problem: Infection:  Goal: Will remain free from infection  Description: Will remain free from infection  Outcome: Ongoing     Problem: Safety:  Goal: Free from accidental physical injury  Description: Free from accidental physical injury  Outcome: Ongoing  Goal: Free from intentional harm  Description: Free from intentional harm  Outcome: Ongoing     Problem: Daily Care:  Goal: Daily care needs are met  Description: Daily care needs are met  Outcome: Ongoing     Problem: Discharge Planning:  Goal: Patients continuum of care needs are met  Description: Patients continuum of care needs are met  Outcome: Ongoing     Problem: Nutrition  Goal: Optimal nutrition therapy  Outcome: Ongoing

## 2021-04-12 NOTE — PROGRESS NOTES
Physical Therapy    Facility/Department: [address] MED SURG  Daily Note  (cotx)  If patient discharges prior to next session this note will serve as a discharge summary. Please see below for the latest assessment towards goals. NAME: Cris Montejo  : 1940  MRN: [id number]    Date of Service: 2021    Discharge Recommendations:  Patient would benefit from continued therapy after discharge, 3-5 sessions per week   Cris Montejo scored a 15/24 on the AM-PAC short mobility form. Current research shows that an AM-PAC score of 17 or less is typically not associated with a discharge to the patient's home setting. Based on the patient's AM-PAC score and their current functional mobility deficits, it is recommended that the patient have 3-5 sessions per week of Physical Therapy at d/c to increase the patient's independence. Please see assessment section for further patient specific details. PT Equipment Recommendations  Equipment Needed: No  Other: defer to next level of care    Assessment   Assessment: Today, the pt presented with con't pain but did not appear to be as SOB as on previous session. The pt required CGA for bed mobility but had the HOB elevated and used the rail; the pt was able to sit EOB with SBA but tebds to lean to the L due to pain; the pt came to standing to the walker with CGA from the bed and min A from the recliner to the walker, the pt was able to take a few step with the walker with min A but max distance today was 10 feet due to pain and SOB. The pt con't to function well below her baseline and will need further skiled PT services at d/c prior to going home. Per the chart, the pt is now being considered for a kyphoplasty at T9. Will con't to follow. Specific instructions for Next Treatment: cotx due to pt's pain and fatigue level  Prognosis: Fair  PT Education: PT Role;General Safety; Disease Specific Education;Transfer Training; Adaptive Device Training;Gait Training  Patient History  Social/Functional History  Lives With: Alone  Type of Home: Apartment  Home Layout: One level  Home Access: Stairs to enter with rails  Entrance Stairs - Number of Steps: 7  Entrance Stairs - Rails: Right  Bathroom Shower/Tub: Walk-in shower, Tub/Shower unit, Shower chair with back(cleans up at sink)  Bathroom Toilet: Standard(with raised toilet seat with rails)  Bathroom Equipment: Hand-held shower, Shower chair  Home Equipment: Cane, Standard walker, Alert Button  ADL Assistance: [address]: Needs assistance(daughter assist with laundry, cleaning, grocery shopping)  Ambulation Assistance: Independent(uses cane)  Transfer Assistance: Independent(uses cane)  Active : No    Objective  Bed mobility  Supine to Sit: Contact guard assistance(HOB elevated and the pt used the rail; increased time needed)  Sit to Supine: Unable to assess(the pt up to the chair)  Scooting: Stand by assistance  Transfers  Sit to Stand: Contact guard assistance;Minimal Assistance(CGA from the EOB and min A fro recliner)  Stand to sit: Contact guard assistance(cues to reach hands back for the bed)  Ambulation  Ambulation?: Yes  Ambulation 1  Surface: level tile  Device: Rolling Walker  Assistance: Minimal assistance  Quality of Gait: more upright posture today, con't with slowed pace, appears to get SOB with activity; max distance due to pain  Distance: 3-4 feet x 1, 10 feet x 1     Balance  Sitting - Static: Good;-(on EOB)  Sitting - Dynamic: Fair  Standing - Static: Fair(with walker)  Standing - Dynamic: Fair;-(with walker)  Comments: seated EOB with SBA; tends to lean to the L; with walker at least CGA with a slight posterior lean  Exercises  Knee Long Arc Quad: x 10 reps B (poor quality)  Ankle Pumps: x 20 reps B     Plan   Plan  Times per week: 3-5x/week  Specific instructions for Next Treatment: cotx due to pt's pain and fatigue level  Current Treatment Recommendations: Functional Mobility Training, Transfer Training, Gait Training, Strengthening, ROM  Safety Devices  Type of devices: Call light within reach, Chair alarm in place, Gait belt, Patient at risk for falls, Left in chair, Nurse notified      AM-PAC Score  AM-PAC Inpatient Mobility Raw Score : 15 (04/12/21 1415)  AM-PAC Inpatient T-Scale Score : 39.45 (04/12/21 1415)  Mobility Inpatient CMS 0-100% Score: 57.7 (04/12/21 1415)  Mobility Inpatient CMS G-Code Modifier : CK (04/12/21 1415)          Goals  Short term goals  Time Frame for Short term goals: upon d/c - goals ongoing 4/12 with slow progress  Short term goal 1: Bed mobility wit flat bed with SBA. Short term goal 2: Transfer sit <> stand with SBA. Short term goal 3: Ambulate with wheeled walker 50 feet with SBA.   Patient Goals   Patient goals : to go home       Therapy Time   Individual Concurrent Group Co-treatment   Time In 2149         Time Out 1426         Minutes 45                 Electronically signed by Fazal Welch, PT [id number] on 4/12/2021 at 2:28 PM

## 2021-04-13 ENCOUNTER — APPOINTMENT (OUTPATIENT)
Dept: MRI IMAGING | Age: 81
DRG: 166 | End: 2021-04-13
Payer: MEDICARE

## 2021-04-13 ENCOUNTER — ANESTHESIA EVENT (OUTPATIENT)
Dept: OPERATING ROOM | Age: 81
DRG: 166 | End: 2021-04-13
Payer: MEDICARE

## 2021-04-13 PROCEDURE — 2700000000 HC OXYGEN THERAPY PER DAY

## 2021-04-13 PROCEDURE — 72146 MRI CHEST SPINE W/O DYE: CPT

## 2021-04-13 PROCEDURE — 6370000000 HC RX 637 (ALT 250 FOR IP): Performed by: NURSE PRACTITIONER

## 2021-04-13 PROCEDURE — 94760 N-INVAS EAR/PLS OXIMETRY 1: CPT

## 2021-04-13 PROCEDURE — 1200000000 HC SEMI PRIVATE

## 2021-04-13 PROCEDURE — 2580000003 HC RX 258: Performed by: INTERNAL MEDICINE

## 2021-04-13 PROCEDURE — 2580000003 HC RX 258: Performed by: NURSE PRACTITIONER

## 2021-04-13 PROCEDURE — 6360000002 HC RX W HCPCS: Performed by: INTERNAL MEDICINE

## 2021-04-13 PROCEDURE — 94640 AIRWAY INHALATION TREATMENT: CPT

## 2021-04-13 PROCEDURE — 6370000000 HC RX 637 (ALT 250 FOR IP): Performed by: INTERNAL MEDICINE

## 2021-04-13 RX ADMIN — METOPROLOL SUCCINATE 50 MG: 50 TABLET, EXTENDED RELEASE ORAL at 10:51

## 2021-04-13 RX ADMIN — OYSTER SHELL CALCIUM WITH VITAMIN D 2 TABLET: 500; 200 TABLET, FILM COATED ORAL at 10:51

## 2021-04-13 RX ADMIN — SODIUM CHLORIDE, PRESERVATIVE FREE 10 ML: 5 INJECTION INTRAVENOUS at 10:52

## 2021-04-13 RX ADMIN — DILTIAZEM HYDROCHLORIDE 120 MG: 120 CAPSULE, COATED, EXTENDED RELEASE ORAL at 10:51

## 2021-04-13 RX ADMIN — SODIUM CHLORIDE 1500 MG: 900 INJECTION INTRAVENOUS at 06:37

## 2021-04-13 RX ADMIN — IPRATROPIUM BROMIDE AND ALBUTEROL SULFATE 1 AMPULE: .5; 3 SOLUTION RESPIRATORY (INHALATION) at 20:30

## 2021-04-13 RX ADMIN — IPRATROPIUM BROMIDE AND ALBUTEROL SULFATE 1 AMPULE: .5; 3 SOLUTION RESPIRATORY (INHALATION) at 12:08

## 2021-04-13 RX ADMIN — SODIUM CHLORIDE 1500 MG: 900 INJECTION INTRAVENOUS at 12:55

## 2021-04-13 RX ADMIN — OYSTER SHELL CALCIUM WITH VITAMIN D 2 TABLET: 500; 200 TABLET, FILM COATED ORAL at 21:10

## 2021-04-13 RX ADMIN — HYDROCODONE BITARTRATE AND ACETAMINOPHEN 1 TABLET: 5; 325 TABLET ORAL at 21:10

## 2021-04-13 RX ADMIN — IPRATROPIUM BROMIDE AND ALBUTEROL SULFATE 1 AMPULE: .5; 3 SOLUTION RESPIRATORY (INHALATION) at 08:02

## 2021-04-13 RX ADMIN — DILTIAZEM HYDROCHLORIDE 120 MG: 120 CAPSULE, COATED, EXTENDED RELEASE ORAL at 21:10

## 2021-04-13 RX ADMIN — SODIUM CHLORIDE 1500 MG: 900 INJECTION INTRAVENOUS at 01:17

## 2021-04-13 RX ADMIN — BUPROPION HYDROCHLORIDE 150 MG: 150 TABLET, EXTENDED RELEASE ORAL at 10:52

## 2021-04-13 RX ADMIN — METHYLPREDNISOLONE SODIUM SUCCINATE 40 MG: 40 INJECTION, POWDER, FOR SOLUTION INTRAMUSCULAR; INTRAVENOUS at 10:52

## 2021-04-13 RX ADMIN — SODIUM CHLORIDE, PRESERVATIVE FREE 10 ML: 5 INJECTION INTRAVENOUS at 21:10

## 2021-04-13 RX ADMIN — HYDROCODONE BITARTRATE AND ACETAMINOPHEN 1 TABLET: 5; 325 TABLET ORAL at 13:01

## 2021-04-13 RX ADMIN — SODIUM CHLORIDE 1500 MG: 900 INJECTION INTRAVENOUS at 19:05

## 2021-04-13 RX ADMIN — IPRATROPIUM BROMIDE AND ALBUTEROL SULFATE 1 AMPULE: .5; 3 SOLUTION RESPIRATORY (INHALATION) at 15:39

## 2021-04-13 RX ADMIN — BUDESONIDE AND FORMOTEROL FUMARATE DIHYDRATE 2 PUFF: 160; 4.5 AEROSOL RESPIRATORY (INHALATION) at 20:30

## 2021-04-13 RX ADMIN — BUDESONIDE AND FORMOTEROL FUMARATE DIHYDRATE 2 PUFF: 160; 4.5 AEROSOL RESPIRATORY (INHALATION) at 08:02

## 2021-04-13 ASSESSMENT — PAIN DESCRIPTION - FREQUENCY
FREQUENCY: CONTINUOUS
FREQUENCY: CONTINUOUS

## 2021-04-13 ASSESSMENT — PAIN DESCRIPTION - ORIENTATION: ORIENTATION: RIGHT

## 2021-04-13 ASSESSMENT — PAIN DESCRIPTION - ONSET: ONSET: ON-GOING

## 2021-04-13 ASSESSMENT — PAIN DESCRIPTION - DESCRIPTORS: DESCRIPTORS: ACHING

## 2021-04-13 ASSESSMENT — PAIN DESCRIPTION - PAIN TYPE: TYPE: ACUTE PAIN

## 2021-04-13 ASSESSMENT — PAIN DESCRIPTION - LOCATION: LOCATION: ABDOMEN

## 2021-04-13 NOTE — PROGRESS NOTES
Pt back to room 4114 from MRI. Bed locked and in lowest position. Belongings in reach.   Electronically signed by Alcon Mace RN on 4/13/2021 at 10:30 AM

## 2021-04-13 NOTE — CARE COORDINATION
4/13 New Neurosurgery consult and MRI. Patient is now scheduled for a Kyphoplasty on 4/14. Discharge plan is to [facility] when medically stable. CLARITA Carlton was approved. They will likely need updated clinicals. Hari Pena at [facility] aware. Electronically signed by Wilian Albarran RN on 4/13/2021 at 1:51 PM

## 2021-04-13 NOTE — PROGRESS NOTES
Occupational Therapy  Harley Lozano  [id number]  [id number]    Attempted to see for OT follow up session this am. Spoke with RN who reports patient to leave for MRI in ~30 minutes. RN request to see patient after MRI. Will attempt to see later this date as able. If discharged prior to next OT session please see last daily note for discharge status.     Electronically signed by Mohamud Bell, [id number] on 4/13/2021 at 7:44 AM

## 2021-04-13 NOTE — PLAN OF CARE
Nutrition Problem #1: Moderate malnutrition  Intervention: Food and/or Nutrient Delivery: Continue Current Diet, Continue Oral Nutrition Supplement  Nutritional Goals:  Tolerate diet and consume greater than 50% of meals and supplements this admission

## 2021-04-13 NOTE — PLAN OF CARE
Problem: Pain:  Goal: Pain level will decrease  Description: Pain level will decrease  4/13/2021 1305 by Sisi Rios RN  Outcome: Ongoing     Problem: Pain:  Goal: Control of acute pain  Description: Control of acute pain  4/13/2021 1305 by Sisi Rios RN  Outcome: Ongoing     Problem: Pain:  Goal: Control of chronic pain  Description: Control of chronic pain  4/13/2021 1305 by Sisi Rios RN  Outcome: Ongoing     Problem: Falls - Risk of:  Goal: Will remain free from falls  Description: Will remain free from falls  4/13/2021 1305 by Sisi Rios RN  Outcome: Ongoing     Problem: Falls - Risk of:  Goal: Absence of physical injury  Description: Absence of physical injury  4/13/2021 1305 by Sisi Rios RN  Outcome: Ongoing     Problem: Skin Integrity:  Goal: Will show no infection signs and symptoms  Description: Will show no infection signs and symptoms  4/13/2021 1305 by Sisi Rios RN  Outcome: Ongoing     Problem: Skin Integrity:  Goal: Absence of new skin breakdown  Description: Absence of new skin breakdown  4/13/2021 1305 by Sisi Rios RN  Outcome: Ongoing     Problem: Breathing Pattern - Ineffective:  Goal: Ability to achieve and maintain a regular respiratory rate will improve  Description: Ability to achieve and maintain a regular respiratory rate will improve  4/13/2021 1305 by Sisi Rios RN  Outcome: Ongoing     Problem: Infection:  Goal: Will remain free from infection  Description: Will remain free from infection  4/13/2021 1305 by Sisi Rios RN  Outcome: Ongoing     Problem: Safety:  Goal: Free from accidental physical injury  Description: Free from accidental physical injury  4/13/2021 1305 by Sisi Rios RN  Outcome: Ongoing     Problem: Safety:  Goal: Free from intentional harm  Description: Free from intentional harm  4/13/2021 1305 by Sisi Rios RN  Outcome: Ongoing     Problem: Daily Care:  Goal: Daily care needs are met  Description: Daily care needs are met  4/13/2021 1305 by Shari Napoles RN  Outcome: Ongoing     Problem: Discharge Planning:  Goal: Patients continuum of care needs are met  Description: Patients continuum of care needs are met  4/13/2021 1305 by Shari Napoles RN  Outcome: Ongoing     Problem: Nutrition  Goal: Optimal nutrition therapy  4/13/2021 1305 by Shari Napoles RN  Outcome: Ongoing

## 2021-04-13 NOTE — PROGRESS NOTES
Comprehensive Nutrition Assessment    Type and Reason for Visit:  Reassess    Nutrition Recommendations/Plan:   - Continue general diet  - Continue Ensure Clear and Ensure Enlive BID    Nutrition Assessment:  Follow-up. On general diet with 1-50% intake. Receiving Ensure Clear and Ensure Enlive twice daily. Pt in need of upper GI EGD and esophageal dilation. Plan for MRI with kyphoplasty today. Will continue ONS and monitor intakes. Malnutrition Assessment:  Malnutrition Status: Moderate malnutrition      Estimated Daily Nutrient Needs:  Energy (kcal):  0053-5701 kcal (30-35 kcal/kg ABW); Weight Used for Energy Requirements:  Admission     Protein (g):  58-67 gm (1.2-1.4 gm/kg ABW); Weight Used for Protein Requirements:  Admission        Fluid (ml/day):  1 ml/kcal      Nutrition Related Findings:  +5.7 liters. Active BS. No edema. Wounds:  None       Current Nutrition Therapies:    DIET GENERAL;  Dietary Nutrition Supplements: Standard High Calorie Oral Supplement  Dietary Nutrition Supplements: Clear Liquid Oral Supplement    Anthropometric Measures:  · Height: 5' 7\" (170.2 cm)  · Current Body Weight: 154 lb (69.9 kg)   · Admission Body Weight: 105 lb (47.6 kg)    · Usual Body Weight: 135 lb (61.2 kg)     · Ideal Body Weight: 135 lbs; % Ideal Body Weight 114.1 %   · BMI: 24.1  · Adjusted Body Weight:  ; No Adjustment   · BMI Categories: Normal Weight (BMI 18.5-24. 9)       Nutrition Diagnosis:   · Moderate malnutrition related to inadequate protein-energy intake, swallowing difficulty as evidenced by weight loss, mild muscle loss    · Inadequate oral intake related to inadequate protein-energy intake as evidenced by intake 26-50%, intake 0-25%    Nutrition Interventions:   Food and/or Nutrient Delivery:  Continue Current Diet, Continue Oral Nutrition Supplement  Nutrition Education/Counseling:  Education not indicated   Coordination of Nutrition Care:  Continue to monitor while inpatient    Goals:   Tolerate diet and consume greater than 50% of meals and supplements this admission       Nutrition Monitoring and Evaluation:   Behavioral-Environmental Outcomes:  None Identified   Food/Nutrient Intake Outcomes:  Food and Nutrient Intake, Supplement Intake  Physical Signs/Symptoms Outcomes:  Chewing or Swallowing, Nutrition Focused Physical Findings, Weight     Discharge Planning:    Continue Oral Nutrition Supplement     Electronically signed by Wayne Velasco RD, LD on 4/13/21 at 8:52 AM EDT    Contact: [phone]

## 2021-04-13 NOTE — PROGRESS NOTES
[address] Day: 12  /70   Pulse 64   Temp 97.3 °F (36.3 °C) (Oral)   Resp 18   Ht 5' 7\" (1.702 m)   Wt 154 lb 1.6 oz (69.9 kg) Comment: Couldn't rmove pillows and put bed flat due to pt pain. SpO2 90%   BMI 24.14 kg/m²     Neurosurgery consulted by Dr. Kait Aguayo for back pain and T9 compression fracture on CT imaging. Patient just returned from MRI. Patient was admitted through the Emergency Dept on 4/2/21 for several days of shortness of breath and weakness. Brought by EMS with several EMS calls prior to admission for SOB. Hypoxic in ER with O2 sat 88% on room air. Troponin 2127. Past medical history is significant including COPD, smoking (last cigarette 1 month ago), renal insufficiency, hypertension, osteoporosis, compression fractures. She has been living alone in an apartment with family assistance, but recently family was in quarantine and unable to provide meals or assistance with meds, bathing, activity. Denied recent trauma or falls. Afebrile. COVID exposure prior to admission. Since admission given supplemental oxygen, steroids. Covid (-). Developed productive cough when eating. Reported difficulty swallowing and esophogram showed esophogeal dysmotility and spasms, possible Schatzki's ring. Patient is lying on left side with O2 at 4L/NC. Very short of breath even at rest, and worse with conversation. Lying on cart and transferred to bed w/o difficulty. Complains of right sided dorsal back pain and pain in the anterior rt chest, tender to palpation over the anterior lateral right lower ribs and shoulders. Reported back pain controlled with Norco or Percocet 10 mg. No hip pain. No leg pain. Ambulates with assist of one to bathroom with rolling walker. Neurological Exam:   Alert and oriented  Moves all extremities. No focal motor deficit. Spinous process over T9 region is very tender to palpation.  Also has pain with palpation over the rt dorsal back/ribs/lateral and anterior right ribs/shoulders. Imaging Studies:  MRI of the thoracic spine reviewed personally by Dr. Zechariah Can and noted acute T9 fracture suitable for treatment with kyphoplasty. CXR on 4/9 - pulmonary venous hypertension with mild edema, bilateral edema and/or atelectasis, left pleural effusion and confirmed on CT thoracic CT  Left larger than right pleural effusions   with areas of atelectasis     CT Thoracic spine:     1. New approximately 50% T9 compression fracture which appears acute/subacute   2. Stable old T5 through T7 compression fractures, status post T7   vertebroplasty     CT Lumbar spine  1. Negative for fracture   2. Lumbar degenerative disease     Labs: WBC elevated after started on steroids. H&H 10 & 30. Electrolytes normal on 4/12, except BUN 30 (improved). Cultures negative. Med list and MAR reviewed. A/P:  Acute/subacute T9 fracture on MRI imaging. Patient is agreeable to kyphoplasty T9 and can be done tomorrow by Dr. Zechariah Can if she is cleared by anesthesia for general anesthesia. She is familiar with the procedure and risks from past procedure. Has no questions. Spoke with Dr. Jo Ann Santiago. Anesthesia consulted for clearance. Will contact patient's daughter, Jefry Funez. NPO after midnight in anticipation for procedure. Lizz Chairez RN  [address] [place] [address] [address] Neurosurgery  [phone]    Addendum:  Spoke with her daughter, Jefry Funez, by phone. Discussed Kyphoplasty T9 procedure and risks, including bleeding, infection, difficulty coming off the ventilator, persistent back pain, leakage of cement/nerve injury, DVT/PEmb, and even death. She had no further questions. Made NPO after midnight in preparation for procedure and pending clearance.

## 2021-04-13 NOTE — PROGRESS NOTES
Physical Therapy  Attempt Note  Levi Osei    Per RN, the pt is being picked up for MRI soon and she would prefer therapy to hold off for now. The pt may possibly have a kyphoplasty later today but will attempt back later as able. If pt. is D/C'd prior to next visit please refer back to last daily progress note for D/C status.   Electronically signed by Kaitlin Chase, PT [id number] on 4/13/2021 at 7:44 AM

## 2021-04-13 NOTE — PLAN OF CARE
met  4/12/2021 2307 by All Bello RN  Outcome: Ongoing     Problem: Discharge Planning:  Goal: Patients continuum of care needs are met  Description: Patients continuum of care needs are met  4/12/2021 2307 by All Bello RN  Outcome: Ongoing     Problem: Nutrition  Goal: Optimal nutrition therapy  4/12/2021 2307 by All Bello RN  Outcome: Ongoing

## 2021-04-13 NOTE — PROGRESS NOTES
Hospitalist Progress Note      PCP: Estela Moya MD    Date of Admission: 4/2/2021    Chief Complaint: Shortness of breath    Hospital Course: Patient is an 70-year-old female with a past medical history of hypertension, hyperlipidemia and COPD. She presented to the emergency department with a 4 to 5-day history of shortness of breath along with weakness and fatigue. 4/12 Patient states she still has back pain from her compression fracture. Discussed need for an MRI followed by kyphoplasty and she is agreeable. MRI ordered and will likely undergo kyphoplastyin next few days. Physical and Occupational Therapy are recommending 3-5 sessions per week. Subjective: Patient seen and examined. Patient underwent MRI showing acute to subacute compression fracture of T9. Neurosurgery consulted for possible kyphoplasty tomorrow. Anesthesiologist consulted as patient still requiring oxygen and want to make sure she is stable for procedure.       Medications:  Reviewed    Infusion Medications    sodium chloride       Scheduled Medications    methylPREDNISolone  40 mg Intravenous Daily    calcium-vitamin D  2 tablet Oral BID    metoprolol succinate  50 mg Oral Daily    ampicillin-sulbactam  1,500 mg Intravenous Q6H    sodium chloride flush  10 mL Intravenous 2 times per day    enoxaparin  40 mg Subcutaneous Daily    ipratropium-albuterol  1 ampule Inhalation Q4H WA    aspirin  81 mg Oral Daily    buPROPion  150 mg Oral Daily    dilTIAZem  120 mg Oral BID    budesonide-formoterol  2 puff Inhalation BID     PRN Meds: oxyCODONE-acetaminophen, HYDROcodone 5 mg - acetaminophen, sodium chloride (Inhalant), sodium chloride flush, sodium chloride, promethazine **OR** ondansetron, polyethylene glycol, acetaminophen **OR** acetaminophen, albuterol, docusate sodium      Intake/Output Summary (Last 24 hours) at 4/13/2021 0844  Last data filed at 4/12/2021 2133  Gross per 24 hour   Intake 370 ml   Output --   Net 370 ml       Physical Exam Performed:    /70   Pulse 64   Temp 97.3 °F (36.3 °C) (Oral)   Resp 18   Ht 5' 7\" (1.702 m)   Wt 154 lb 1.6 oz (69.9 kg) Comment: Couldn't rmove pillows and put bed flat due to pt pain. SpO2 90%   BMI 24.14 kg/m²     General appearance: No apparent distress, appears stated age and cooperative. HEENT: Pupils equal, round, and reactive to light. Conjunctivae/corneas clear. Neck: Supple, with full range of motion. No jugular venous distention. Trachea midline. Respiratory:  Normal respiratory effort. Clear to auscultation, bilaterally without Rales/Wheezes/Rhonchi. Cardiovascular: Regular rate and rhythm with normal S1/S2   Abdomen: Soft, non-tender, non-distended with normal bowel sounds. Musculoskeletal: No clubbing, cyanosis or edema bilaterally. Skin: Skin color, texture, turgor normal.  No rashes or lesions. Neurologic:  Neurovascularly intact without any focal sensory/motor deficits. Cranial nerves: II-XII intact, grossly non-focal.  Psychiatric: Alert and oriented, thought content appropriate, normal insight  Capillary Refill: Brisk,< 3 seconds   Peripheral Pulses: +2 palpable, equal bilaterally       Labs:   Recent Labs     04/12/21  0603   WBC 10.6   HGB 10.0*   HCT 30.4*        Recent Labs     04/12/21  0603      K 4.7      CO2 30   BUN 30*   CREATININE 0.7   CALCIUM 9.4   PHOS 1.6*     No results for input(s): AST, ALT, BILIDIR, BILITOT, ALKPHOS in the last 72 hours. No results for input(s): INR in the last 72 hours. No results for input(s): Meldon Vergara in the last 72 hours. Urinalysis:      Lab Results   Component Value Date    NITRU Negative 04/03/2021    WBCUA 8 04/03/2021    BACTERIA 3+ 04/03/2021    RBCUA 1 04/03/2021    BLOODU Negative 04/03/2021    SPECGRAV 1.026 04/03/2021    GLUCOSEU Negative 04/03/2021       Radiology:  CT LUMBAR SPINE WO CONTRAST   Final Result   1. Negative for fracture   2.  Lumbar degenerative disease         CT THORACIC SPINE WO CONTRAST   Final Result   1. New approximately 50% T9 compression fracture which appears acute/subacute   2. Stable old T5 through T7 compression fractures, status post T7   vertebroplasty         XR CHEST PORTABLE   Final Result   Pulmonary venous hypertension with mild edema. Bibasilar airspace disease   may represent edema and/or atelectasis. There is left pleural effusion as   well         FL ESOPHAGRAM   Final Result   Esophageal dysmotility with tertiary contractions and spasms. Small hiatal hernia with focal persistent defect compatible with what appears   to be a Schatzki's ring. Evaluation is limited due to patient's ability to   position for exam.         Fluoroscopy modified barium swallow with video   Final Result   Swallowing mechanism grossly within normal limits without evidence of   aspiration. Please see separate speech pathology report for full discussion of findings   and recommendations. XR CHEST PORTABLE   Final Result   No acute disease.          MRI THORACIC SPINE WO CONTRAST    (Results Pending)           Assessment/Plan:    COPD exacerbation  Resume Solu-Medrol, 3% normal saline  Unasyn IV  Patient may be silently aspirating due to esophageal dysmotility and possible stricture  Resolving    Acute respiratory failure with hypoxia  Secondary to COPD exacerbation  Down to 2 L    Dysphagia  MBS is normal with no signs of aspiration  Esophagram performed showing dysmotility with spasms, possible Schatzki ring  Already on calcium channel blocker  Will need EGD to investigate/treat this  Continues to have struggles with solid food  GI consulted, EGD if symptoms persist once COPD improved    Back pain  History of T7 compression fracture status post kyphoplasty  CT on this admission showing a new T9 compression fracture  Continue with pain control  MRI ordered  Neurosurgery consulted as they performed previous kypho, possible procedure tomorrow if okay with anesthesiology    Generalized weakness  SNF versus home health at discharge  Patient would like Rian galarza    RASHAWN  Creatinine up to 1.6 from 1  Likely prerenal in the setting of hypotension and NSAIDs  Resolved      DVT Prophylaxis: Aspirin  Diet: DIET GENERAL;  Dietary Nutrition Supplements: Standard High Calorie Oral Supplement  Dietary Nutrition Supplements: Clear Liquid Oral Supplement  Code Status: Full Code    PT/OT Eval Status: 3-5 sessions per week    Dispo -inpatient, discharge to SNF status post kyphoplasty    Ritchie Potts MD

## 2021-04-14 ENCOUNTER — ANESTHESIA (OUTPATIENT)
Dept: OPERATING ROOM | Age: 81
DRG: 166 | End: 2021-04-14
Payer: MEDICARE

## 2021-04-14 ENCOUNTER — APPOINTMENT (OUTPATIENT)
Dept: GENERAL RADIOLOGY | Age: 81
DRG: 166 | End: 2021-04-14
Payer: MEDICARE

## 2021-04-14 VITALS
RESPIRATION RATE: 16 BRPM | DIASTOLIC BLOOD PRESSURE: 65 MMHG | OXYGEN SATURATION: 100 % | TEMPERATURE: 97 F | SYSTOLIC BLOOD PRESSURE: 141 MMHG

## 2021-04-14 LAB
APTT: 23.9 SEC (ref 24.2–36.2)
INR BLD: 0.98 (ref 0.86–1.14)
PROTHROMBIN TIME: 11.4 SEC (ref 10–13.2)
SARS-COV-2, NAAT: NOT DETECTED

## 2021-04-14 PROCEDURE — 36415 COLL VENOUS BLD VENIPUNCTURE: CPT

## 2021-04-14 PROCEDURE — 2500000003 HC RX 250 WO HCPCS: Performed by: NEUROLOGICAL SURGERY

## 2021-04-14 PROCEDURE — 94761 N-INVAS EAR/PLS OXIMETRY MLT: CPT

## 2021-04-14 PROCEDURE — 6370000000 HC RX 637 (ALT 250 FOR IP): Performed by: NURSE PRACTITIONER

## 2021-04-14 PROCEDURE — 6360000002 HC RX W HCPCS: Performed by: INTERNAL MEDICINE

## 2021-04-14 PROCEDURE — 2580000003 HC RX 258

## 2021-04-14 PROCEDURE — 0PS43ZZ REPOSITION THORACIC VERTEBRA, PERCUTANEOUS APPROACH: ICD-10-PCS | Performed by: NEUROLOGICAL SURGERY

## 2021-04-14 PROCEDURE — 2709999900 HC NON-CHARGEABLE SUPPLY: Performed by: NEUROLOGICAL SURGERY

## 2021-04-14 PROCEDURE — 94660 CPAP INITIATION&MGMT: CPT

## 2021-04-14 PROCEDURE — 2580000003 HC RX 258: Performed by: NEUROLOGICAL SURGERY

## 2021-04-14 PROCEDURE — 94640 AIRWAY INHALATION TREATMENT: CPT

## 2021-04-14 PROCEDURE — 6360000002 HC RX W HCPCS: Performed by: NEUROLOGICAL SURGERY

## 2021-04-14 PROCEDURE — 0PB43ZX EXCISION OF THORACIC VERTEBRA, PERCUTANEOUS APPROACH, DIAGNOSTIC: ICD-10-PCS | Performed by: NEUROLOGICAL SURGERY

## 2021-04-14 PROCEDURE — 3600000003 HC SURGERY LEVEL 3 BASE: Performed by: NEUROLOGICAL SURGERY

## 2021-04-14 PROCEDURE — 2500000003 HC RX 250 WO HCPCS

## 2021-04-14 PROCEDURE — 93005 ELECTROCARDIOGRAM TRACING: CPT | Performed by: ANESTHESIOLOGY

## 2021-04-14 PROCEDURE — 2580000003 HC RX 258: Performed by: INTERNAL MEDICINE

## 2021-04-14 PROCEDURE — 3600000013 HC SURGERY LEVEL 3 ADDTL 15MIN: Performed by: NEUROLOGICAL SURGERY

## 2021-04-14 PROCEDURE — 6370000000 HC RX 637 (ALT 250 FOR IP): Performed by: NEUROLOGICAL SURGERY

## 2021-04-14 PROCEDURE — 0PU43JZ SUPPLEMENT THORACIC VERTEBRA WITH SYNTHETIC SUBSTITUTE, PERCUTANEOUS APPROACH: ICD-10-PCS | Performed by: NEUROLOGICAL SURGERY

## 2021-04-14 PROCEDURE — 7100000000 HC PACU RECOVERY - FIRST 15 MIN: Performed by: NEUROLOGICAL SURGERY

## 2021-04-14 PROCEDURE — 2060000000 HC ICU INTERMEDIATE R&B

## 2021-04-14 PROCEDURE — 85730 THROMBOPLASTIN TIME PARTIAL: CPT

## 2021-04-14 PROCEDURE — 2580000003 HC RX 258: Performed by: NURSE PRACTITIONER

## 2021-04-14 PROCEDURE — 6360000002 HC RX W HCPCS

## 2021-04-14 PROCEDURE — 85610 PROTHROMBIN TIME: CPT

## 2021-04-14 PROCEDURE — 2700000000 HC OXYGEN THERAPY PER DAY

## 2021-04-14 PROCEDURE — 6370000000 HC RX 637 (ALT 250 FOR IP): Performed by: INTERNAL MEDICINE

## 2021-04-14 PROCEDURE — 3700000000 HC ANESTHESIA ATTENDED CARE: Performed by: NEUROLOGICAL SURGERY

## 2021-04-14 PROCEDURE — 6360000002 HC RX W HCPCS: Performed by: NURSE PRACTITIONER

## 2021-04-14 PROCEDURE — 2580000003 HC RX 258: Performed by: ANESTHESIOLOGY

## 2021-04-14 PROCEDURE — 87635 SARS-COV-2 COVID-19 AMP PRB: CPT

## 2021-04-14 PROCEDURE — 72020 X-RAY EXAM OF SPINE 1 VIEW: CPT

## 2021-04-14 PROCEDURE — 88311 DECALCIFY TISSUE: CPT

## 2021-04-14 PROCEDURE — 6360000004 HC RX CONTRAST MEDICATION: Performed by: NEUROLOGICAL SURGERY

## 2021-04-14 PROCEDURE — 3700000001 HC ADD 15 MINUTES (ANESTHESIA): Performed by: NEUROLOGICAL SURGERY

## 2021-04-14 PROCEDURE — C1894 INTRO/SHEATH, NON-LASER: HCPCS | Performed by: NEUROLOGICAL SURGERY

## 2021-04-14 PROCEDURE — 3209999900 FLUORO FOR SURGICAL PROCEDURES

## 2021-04-14 PROCEDURE — 88307 TISSUE EXAM BY PATHOLOGIST: CPT

## 2021-04-14 PROCEDURE — C1713 ANCHOR/SCREW BN/BN,TIS/BN: HCPCS | Performed by: NEUROLOGICAL SURGERY

## 2021-04-14 PROCEDURE — 7100000001 HC PACU RECOVERY - ADDTL 15 MIN: Performed by: NEUROLOGICAL SURGERY

## 2021-04-14 DEVICE — CEMENT C01A KYPHX HV-R BONE CEMENT EN
Type: IMPLANTABLE DEVICE | Site: SPINE THORACIC | Status: FUNCTIONAL
Brand: KYPHON® HV-R® BONE CEMENT

## 2021-04-14 DEVICE — BONE CEMENT C01B HV-R WITH MIXER  US
Type: IMPLANTABLE DEVICE | Site: SPINE THORACIC | Status: FUNCTIONAL
Brand: KYPHON® HV-R® BONE CEMENT AND KYPHON® MIXER PACK

## 2021-04-14 RX ORDER — ALBUTEROL SULFATE 2.5 MG/3ML
SOLUTION RESPIRATORY (INHALATION)
Status: DISCONTINUED
Start: 2021-04-14 | End: 2021-04-14

## 2021-04-14 RX ORDER — HYDRALAZINE HYDROCHLORIDE 20 MG/ML
5 INJECTION INTRAMUSCULAR; INTRAVENOUS EVERY 10 MIN PRN
Status: DISCONTINUED | OUTPATIENT
Start: 2021-04-14 | End: 2021-04-14 | Stop reason: HOSPADM

## 2021-04-14 RX ORDER — SODIUM CHLORIDE 9 MG/ML
INJECTION, SOLUTION INTRAVENOUS CONTINUOUS
Status: DISCONTINUED | OUTPATIENT
Start: 2021-04-14 | End: 2021-04-14

## 2021-04-14 RX ORDER — BUPIVACAINE HYDROCHLORIDE AND EPINEPHRINE 5; 5 MG/ML; UG/ML
INJECTION, SOLUTION EPIDURAL; INTRACAUDAL; PERINEURAL
Status: COMPLETED | OUTPATIENT
Start: 2021-04-14 | End: 2021-04-14

## 2021-04-14 RX ORDER — ONDANSETRON 2 MG/ML
4 INJECTION INTRAMUSCULAR; INTRAVENOUS
Status: DISCONTINUED | OUTPATIENT
Start: 2021-04-14 | End: 2021-04-14 | Stop reason: HOSPADM

## 2021-04-14 RX ORDER — ONDANSETRON 2 MG/ML
INJECTION INTRAMUSCULAR; INTRAVENOUS PRN
Status: DISCONTINUED | OUTPATIENT
Start: 2021-04-14 | End: 2021-04-14 | Stop reason: SDUPTHER

## 2021-04-14 RX ORDER — FENTANYL CITRATE 50 UG/ML
INJECTION, SOLUTION INTRAMUSCULAR; INTRAVENOUS PRN
Status: DISCONTINUED | OUTPATIENT
Start: 2021-04-14 | End: 2021-04-14 | Stop reason: SDUPTHER

## 2021-04-14 RX ORDER — SUCCINYLCHOLINE/SOD CL,ISO/PF 200MG/10ML
SYRINGE (ML) INTRAVENOUS PRN
Status: DISCONTINUED | OUTPATIENT
Start: 2021-04-14 | End: 2021-04-14 | Stop reason: SDUPTHER

## 2021-04-14 RX ORDER — SODIUM CHLORIDE 9 MG/ML
25 INJECTION, SOLUTION INTRAVENOUS PRN
Status: DISCONTINUED | OUTPATIENT
Start: 2021-04-14 | End: 2021-04-14 | Stop reason: HOSPADM

## 2021-04-14 RX ORDER — PROPOFOL 10 MG/ML
INJECTION, EMULSION INTRAVENOUS PRN
Status: DISCONTINUED | OUTPATIENT
Start: 2021-04-14 | End: 2021-04-14 | Stop reason: SDUPTHER

## 2021-04-14 RX ORDER — MEPERIDINE HYDROCHLORIDE 25 MG/ML
12.5 INJECTION INTRAMUSCULAR; INTRAVENOUS; SUBCUTANEOUS
Status: DISCONTINUED | OUTPATIENT
Start: 2021-04-14 | End: 2021-04-14 | Stop reason: HOSPADM

## 2021-04-14 RX ORDER — SODIUM CHLORIDE 0.9 % (FLUSH) 0.9 %
5-40 SYRINGE (ML) INJECTION PRN
Status: DISCONTINUED | OUTPATIENT
Start: 2021-04-14 | End: 2021-04-14 | Stop reason: HOSPADM

## 2021-04-14 RX ORDER — DEXAMETHASONE SODIUM PHOSPHATE 4 MG/ML
INJECTION, SOLUTION INTRA-ARTICULAR; INTRALESIONAL; INTRAMUSCULAR; INTRAVENOUS; SOFT TISSUE PRN
Status: DISCONTINUED | OUTPATIENT
Start: 2021-04-14 | End: 2021-04-14 | Stop reason: SDUPTHER

## 2021-04-14 RX ORDER — PROMETHAZINE HYDROCHLORIDE 25 MG/ML
6.25 INJECTION, SOLUTION INTRAMUSCULAR; INTRAVENOUS
Status: DISCONTINUED | OUTPATIENT
Start: 2021-04-14 | End: 2021-04-14 | Stop reason: HOSPADM

## 2021-04-14 RX ORDER — FENTANYL CITRATE 50 UG/ML
50 INJECTION, SOLUTION INTRAMUSCULAR; INTRAVENOUS EVERY 5 MIN PRN
Status: DISCONTINUED | OUTPATIENT
Start: 2021-04-14 | End: 2021-04-14 | Stop reason: HOSPADM

## 2021-04-14 RX ORDER — FENTANYL CITRATE 50 UG/ML
25 INJECTION, SOLUTION INTRAMUSCULAR; INTRAVENOUS EVERY 5 MIN PRN
Status: DISCONTINUED | OUTPATIENT
Start: 2021-04-14 | End: 2021-04-14 | Stop reason: HOSPADM

## 2021-04-14 RX ORDER — VANCOMYCIN HYDROCHLORIDE 1 G/20ML
INJECTION, POWDER, LYOPHILIZED, FOR SOLUTION INTRAVENOUS
Status: COMPLETED | OUTPATIENT
Start: 2021-04-14 | End: 2021-04-14

## 2021-04-14 RX ORDER — PHENYLEPHRINE HCL IN 0.9% NACL 1 MG/10 ML
SYRINGE (ML) INTRAVENOUS PRN
Status: DISCONTINUED | OUTPATIENT
Start: 2021-04-14 | End: 2021-04-14 | Stop reason: SDUPTHER

## 2021-04-14 RX ORDER — LIDOCAINE HYDROCHLORIDE 20 MG/ML
INJECTION, SOLUTION EPIDURAL; INFILTRATION; INTRACAUDAL; PERINEURAL PRN
Status: DISCONTINUED | OUTPATIENT
Start: 2021-04-14 | End: 2021-04-14 | Stop reason: SDUPTHER

## 2021-04-14 RX ORDER — SODIUM CHLORIDE 0.9 % (FLUSH) 0.9 %
5-40 SYRINGE (ML) INJECTION EVERY 12 HOURS SCHEDULED
Status: DISCONTINUED | OUTPATIENT
Start: 2021-04-14 | End: 2021-04-14 | Stop reason: HOSPADM

## 2021-04-14 RX ORDER — ROCURONIUM BROMIDE 10 MG/ML
INJECTION, SOLUTION INTRAVENOUS PRN
Status: DISCONTINUED | OUTPATIENT
Start: 2021-04-14 | End: 2021-04-14 | Stop reason: SDUPTHER

## 2021-04-14 RX ADMIN — BUDESONIDE AND FORMOTEROL FUMARATE DIHYDRATE 2 PUFF: 160; 4.5 AEROSOL RESPIRATORY (INHALATION) at 08:21

## 2021-04-14 RX ADMIN — OYSTER SHELL CALCIUM WITH VITAMIN D 2 TABLET: 500; 200 TABLET, FILM COATED ORAL at 12:04

## 2021-04-14 RX ADMIN — BUDESONIDE AND FORMOTEROL FUMARATE DIHYDRATE 2 PUFF: 160; 4.5 AEROSOL RESPIRATORY (INHALATION) at 19:42

## 2021-04-14 RX ADMIN — Medication 100 MCG: at 16:05

## 2021-04-14 RX ADMIN — PHENYLEPHRINE HYDROCHLORIDE 20 MCG/MIN: 10 INJECTION INTRAVENOUS at 16:44

## 2021-04-14 RX ADMIN — OYSTER SHELL CALCIUM WITH VITAMIN D 2 TABLET: 500; 200 TABLET, FILM COATED ORAL at 21:11

## 2021-04-14 RX ADMIN — DILTIAZEM HYDROCHLORIDE 120 MG: 120 CAPSULE, COATED, EXTENDED RELEASE ORAL at 12:04

## 2021-04-14 RX ADMIN — SODIUM CHLORIDE 1500 MG: 900 INJECTION INTRAVENOUS at 13:33

## 2021-04-14 RX ADMIN — SODIUM CHLORIDE 1500 MG: 900 INJECTION INTRAVENOUS at 09:02

## 2021-04-14 RX ADMIN — LIDOCAINE HYDROCHLORIDE 60 MG: 20 INJECTION, SOLUTION EPIDURAL; INFILTRATION; INTRACAUDAL; PERINEURAL at 16:01

## 2021-04-14 RX ADMIN — SODIUM CHLORIDE, PRESERVATIVE FREE 10 ML: 5 INJECTION INTRAVENOUS at 09:02

## 2021-04-14 RX ADMIN — Medication 200 MCG: at 16:11

## 2021-04-14 RX ADMIN — SUGAMMADEX 100 MG: 100 INJECTION, SOLUTION INTRAVENOUS at 16:56

## 2021-04-14 RX ADMIN — IPRATROPIUM BROMIDE AND ALBUTEROL SULFATE 1 AMPULE: .5; 3 SOLUTION RESPIRATORY (INHALATION) at 11:31

## 2021-04-14 RX ADMIN — BUPROPION HYDROCHLORIDE 150 MG: 150 TABLET, EXTENDED RELEASE ORAL at 12:04

## 2021-04-14 RX ADMIN — Medication 200 MCG: at 16:26

## 2021-04-14 RX ADMIN — PROPOFOL 80 MG: 10 INJECTION, EMULSION INTRAVENOUS at 16:01

## 2021-04-14 RX ADMIN — Medication 200 MCG: at 16:44

## 2021-04-14 RX ADMIN — DILTIAZEM HYDROCHLORIDE 120 MG: 120 CAPSULE, COATED, EXTENDED RELEASE ORAL at 21:11

## 2021-04-14 RX ADMIN — SODIUM CHLORIDE, PRESERVATIVE FREE 10 ML: 5 INJECTION INTRAVENOUS at 21:12

## 2021-04-14 RX ADMIN — OXYCODONE AND ACETAMINOPHEN 1 TABLET: 10; 325 TABLET ORAL at 09:02

## 2021-04-14 RX ADMIN — Medication 200 MCG: at 16:32

## 2021-04-14 RX ADMIN — ONDANSETRON 4 MG: 2 INJECTION INTRAMUSCULAR; INTRAVENOUS at 16:54

## 2021-04-14 RX ADMIN — Medication 200 MCG: at 16:07

## 2021-04-14 RX ADMIN — PROPOFOL 20 MG: 10 INJECTION, EMULSION INTRAVENOUS at 16:03

## 2021-04-14 RX ADMIN — METOPROLOL SUCCINATE 50 MG: 50 TABLET, EXTENDED RELEASE ORAL at 12:05

## 2021-04-14 RX ADMIN — SUGAMMADEX 100 MG: 100 INJECTION, SOLUTION INTRAVENOUS at 16:54

## 2021-04-14 RX ADMIN — OXYCODONE AND ACETAMINOPHEN 1 TABLET: 10; 325 TABLET ORAL at 01:23

## 2021-04-14 RX ADMIN — ROCURONIUM BROMIDE 20 MG: 10 INJECTION INTRAVENOUS at 16:21

## 2021-04-14 RX ADMIN — ALBUTEROL SULFATE 2.5 MG: 2.5 SOLUTION RESPIRATORY (INHALATION) at 01:35

## 2021-04-14 RX ADMIN — SODIUM CHLORIDE 1500 MG: 900 INJECTION INTRAVENOUS at 00:35

## 2021-04-14 RX ADMIN — METHYLPREDNISOLONE SODIUM SUCCINATE 40 MG: 40 INJECTION, POWDER, FOR SOLUTION INTRAMUSCULAR; INTRAVENOUS at 09:02

## 2021-04-14 RX ADMIN — ACETAMINOPHEN 650 MG: 325 TABLET ORAL at 21:17

## 2021-04-14 RX ADMIN — DEXAMETHASONE SODIUM PHOSPHATE 8 MG: 4 INJECTION, SOLUTION INTRAMUSCULAR; INTRAVENOUS at 16:11

## 2021-04-14 RX ADMIN — FENTANYL CITRATE 25 MCG: 50 INJECTION INTRAMUSCULAR; INTRAVENOUS at 16:40

## 2021-04-14 RX ADMIN — Medication 100 MG: at 16:03

## 2021-04-14 RX ADMIN — IPRATROPIUM BROMIDE AND ALBUTEROL SULFATE 1 AMPULE: .5; 3 SOLUTION RESPIRATORY (INHALATION) at 19:42

## 2021-04-14 RX ADMIN — Medication 300 MCG: at 16:13

## 2021-04-14 RX ADMIN — Medication 200 MCG: at 16:52

## 2021-04-14 RX ADMIN — SODIUM CHLORIDE: 9 INJECTION, SOLUTION INTRAVENOUS at 15:12

## 2021-04-14 RX ADMIN — SODIUM CHLORIDE 1500 MG: 900 INJECTION INTRAVENOUS at 18:38

## 2021-04-14 RX ADMIN — IPRATROPIUM BROMIDE AND ALBUTEROL SULFATE 1 AMPULE: .5; 3 SOLUTION RESPIRATORY (INHALATION) at 08:21

## 2021-04-14 ASSESSMENT — PAIN DESCRIPTION - FREQUENCY
FREQUENCY: CONTINUOUS
FREQUENCY: CONTINUOUS

## 2021-04-14 ASSESSMENT — PULMONARY FUNCTION TESTS
PIF_VALUE: 17
PIF_VALUE: 21
PIF_VALUE: 21
PIF_VALUE: 19
PIF_VALUE: 17
PIF_VALUE: 18
PIF_VALUE: 22
PIF_VALUE: 1
PIF_VALUE: 18
PIF_VALUE: 20
PIF_VALUE: 19
PIF_VALUE: 2
PIF_VALUE: 0
PIF_VALUE: 18
PIF_VALUE: 19
PIF_VALUE: 22
PIF_VALUE: 5
PIF_VALUE: 19
PIF_VALUE: 2
PIF_VALUE: 19
PIF_VALUE: 20
PIF_VALUE: 19
PIF_VALUE: 20
PIF_VALUE: 2
PIF_VALUE: 22
PIF_VALUE: 24
PIF_VALUE: 22
PIF_VALUE: 19

## 2021-04-14 ASSESSMENT — PAIN DESCRIPTION - DESCRIPTORS
DESCRIPTORS: HEADACHE
DESCRIPTORS: ACHING;DISCOMFORT

## 2021-04-14 ASSESSMENT — PAIN DESCRIPTION - LOCATION
LOCATION: BACK;ABDOMEN
LOCATION: BACK;ABDOMEN
LOCATION: HEAD

## 2021-04-14 ASSESSMENT — PAIN DESCRIPTION - ONSET: ONSET: ON-GOING

## 2021-04-14 ASSESSMENT — PAIN DESCRIPTION - PROGRESSION
CLINICAL_PROGRESSION: GRADUALLY WORSENING
CLINICAL_PROGRESSION: NOT CHANGED

## 2021-04-14 ASSESSMENT — PAIN DESCRIPTION - PAIN TYPE: TYPE: ACUTE PAIN

## 2021-04-14 ASSESSMENT — PAIN - FUNCTIONAL ASSESSMENT
PAIN_FUNCTIONAL_ASSESSMENT: PREVENTS OR INTERFERES WITH ALL ACTIVE AND SOME PASSIVE ACTIVITIES
PAIN_FUNCTIONAL_ASSESSMENT: ACTIVITIES ARE NOT PREVENTED

## 2021-04-14 ASSESSMENT — PAIN SCALES - GENERAL: PAINLEVEL_OUTOF10: 0

## 2021-04-14 NOTE — BRIEF OP NOTE
Brief Postoperative Note      Patient: Soha Zhao  YOB: 1940  MRN: [id number]    Date of Procedure: 4/14/2021    Pre-Op Diagnosis: AGE RELATED OSTEOPOROSIS FRACTURE    Post-Op Diagnosis: Same       Procedure(s):  T9 KYPHOPLASTY WITH BONE BIOPSY    Surgeon(s):  Williams Pollack MD    Assistant:  Surgical Assistant: Vilma Alicia    Anesthesia: General    Estimated Blood Loss (mL): Minimal    Complications: None    Specimens:   ID Type Source Tests Collected by Time Destination   A : A   T9 BONE BIOPSY Tissue Tissue SURGICAL PATHOLOGY Williams Pollack MD 4/14/2021 1639        Implants:  Implant Name Type Inv. Item Serial No.  Lot No. LRB No. Used Action   CEMENT BNE FULL DOSE HI VISC RADPQ W/O ANTIBIO FOR  CEMENT BNE FULL DOSE HI VISC RADPQ W/O ANTIBIO FOR  [employer] [facility] DANEK- [id number] N/A 1 Implanted   KIT CEMENT BONE W/KYPHON MIXER TUBE FUNNL Cement KIT CEMENT BONE W/KYPHON MIXER TUBE [address] [facility] [id number] N/A 1 Implanted         Drains: * No LDAs found *    Findings: T9 compression fradture. Good fill endplate to endplate, crossed midline with interdigitation.      Electronically signed by Williams Pollack MD on 4/14/2021 at 5:04 PM     [id number]

## 2021-04-14 NOTE — PROGRESS NOTES
Dr. Tiney Aase notified that pt's EKG read as a-fib. No further orders, pt ok'd to be transferred to 3381.

## 2021-04-14 NOTE — PLAN OF CARE
RN  Outcome: Ongoing  4/13/2021 1305 by José Gagnon RN  Outcome: Ongoing  Goal: Free from intentional harm  Description: Free from intentional harm  4/13/2021 2236 by Danielle Wharton RN  Outcome: Ongoing  4/13/2021 1305 by José Gagnon RN  Outcome: Ongoing     Problem: Daily Care:  Goal: Daily care needs are met  Description: Daily care needs are met  4/13/2021 2236 by Danielle Wharton RN  Outcome: Ongoing  4/13/2021 1305 by José Gagnon RN  Outcome: Ongoing     Problem: Discharge Planning:  Goal: Patients continuum of care needs are met  Description: Patients continuum of care needs are met  4/13/2021 2236 by Danielle Wharton RN  Outcome: Ongoing  4/13/2021 1305 by José Gagnon RN  Outcome: Ongoing     Problem: Nutrition  Goal: Optimal nutrition therapy  4/13/2021 2236 by Danielle Wharton RN  Outcome: Ongoing  4/13/2021 1305 by José Gagnon RN  Outcome: Ongoing  4/13/2021 0852 by Rowena Metcalf RD, LD  Outcome: Ongoing

## 2021-04-14 NOTE — PROGRESS NOTES
Physical Therapy  HOLD Note  Rufina Gaytan    Will place the pt on HOLD as she is scheduled for kyphoplasty today at 1530. Will need new orders post-procedure to resume therapy. If pt. is D/C'd prior to next visit please refer back to last daily progress note for D/C status.   Electronically signed by Newton Alejandre, PT [id number] on 4/14/2021 at 11:48 AM

## 2021-04-14 NOTE — ANESTHESIA POSTPROCEDURE EVALUATION
Department of Anesthesiology  Postprocedure Note    Patient: Cheri Sahu  MRN: [id number]  YOB: 1940  Date of evaluation: 4/14/2021  Time:  5:34 PM     Procedure Summary     Date: 04/14/21 Room / Location: [address] [place] [address] / [facility]    Anesthesia Start: 4548 Anesthesia Stop: 8261    Procedure: T9 KYPHOPLASTY WITH BONE BIOPSY (N/A Spine Thoracic) Diagnosis: (AGE RELATED OSTEOPOROSIS FRACTURE)    Surgeons: Jeniffer Vega MD Responsible Provider: Annabelle Dietz MD    Anesthesia Type: general ASA Status: 3          Anesthesia Type: general    Caleb Phase I: Caleb Score: 5    Caleb Phase II:      Last vitals: Reviewed and per EMR flowsheets.        Anesthesia Post Evaluation    Patient location during evaluation: PACU  Patient participation: complete - patient participated  Level of consciousness: awake, sleepy but conscious and responsive to verbal stimuli  Pain score: 2  Airway patency: patent  Nausea & Vomiting: no nausea and no vomiting  Complications: no  Cardiovascular status: hemodynamically stable and blood pressure returned to baseline  Respiratory status: acceptable and BIPAP  Hydration status: euvolemic  Comments: Afib in PACU

## 2021-04-14 NOTE — PROGRESS NOTES
Pt being transported to OR. Treatment consent signed and in chart. Checklist complete. Patent IV in place.   Electronically signed by Jose Wei RN on 4/14/2021 at 2:25 PM

## 2021-04-14 NOTE — PROGRESS NOTES
Pt transported to 5111 from PACU on 6lpm NC. Pt awake, alert and oriented. RR 18-20 and easy.   Will keep off bipap at this time and continue to monitor, RN informed

## 2021-04-14 NOTE — ANESTHESIA PRE PROCEDURE
[employer] Department of Anesthesiology  Pre-Anesthesia Evaluation/Consultation       Name:  Ryne Holt  : 1940  Age:  78 y.o.                                            MRN:  [id number]  Date: 2021           Surgeon: Surgeon(s):  Genny Hagen MD    Procedure: Procedure(s):  T9 KYPHOPLASTY WITH BONE BIOPSY     Allergies   Allergen Reactions    Adhesive Tape Other (See Comments)     Can cause irritation when on awhile    Codeine      GI upset    Iodine Hives    Lisinopril      Angioedema       Patient Active Problem List   Diagnosis    Moderate persistent asthma without complication    Essential hypertension    Renal insufficiency    Cataract    Hypercholesterolemia LDL goal < 160    Stress incontinence    Macular degeneration    Psoriasis    Osteoporosis- DXA -3.2 (), -2.9 (), -3.3 ()    Osteoarthritis    IBS (irritable bowel syndrome)    Colon polyp- stop due to age   Rekha Kuo Tobacco abuse    Pulmonary nodule, right- stbale, no longer monitoring    Frequent PVCs    COPD (chronic obstructive pulmonary disease) (HCC)    Eczema of right eyelid    Adjustment disorder with anxious mood    Sciatica    Low back pain    Venous insufficiency of both lower extremities    Uterovaginal prolapse    Non-recurrent unilateral inguinal hernia without obstruction or gangrene    Hyperglycemia    Chronic constipation    Left inguinal hernia    Intention tremor    Compression fracture of T7 vertebra (HCC)    Senile osteoporosis    COPD exacerbation (HCC)    Acute respiratory failure with hypoxia (HCC)    Generalized weakness    Fatigue    Poor appetite    Moderate malnutrition (HCC)     Past Medical History:   Diagnosis Date    HTN (hypertension)     Hypercholesterolemia     MAC (mycobacterium avium-intracellulare complex)     Osteoarthritis     Osteopetrosis     Papanicolaou smear of cervix with atypical squamous cells of undetermined significance (ASC-US) 1998    Shingles     Stress incontinence      Past Surgical History:   Procedure Laterality Date    BREAST SURGERY      CATARACT REMOVAL Left 2009    CHOLECYSTECTOMY      CT BONE MARROW BIOPSY  2021    CT BONE MARROW BIOPSY 2021 [facility]    INGUINAL HERNIA REPAIR Right 2015    SPINE SURGERY N/A 2020    T7 KYPHOPLASTY WITH BONE BIOPSY performed by Garo Medel [facility] at [facility] History     Tobacco Use    Smoking status: Former Smoker     Packs/day: 0.50     Years: 65.00     Pack years: 32.50     Types: Cigarettes     Quit date: 10/6/2020     Years since quittin.5    Smokeless tobacco: Never Used    Tobacco comment: Advised to quit repeatedly, not interested   Substance Use Topics    Alcohol use: Yes     Comment: rare    Drug use: No     Medications  Current Facility-Administered Medications on File Prior to Visit   Medication Dose Route Frequency Provider Last Rate Last Admin    0.9 % sodium chloride infusion   Intravenous Continuous Florentino Edmonds MD        sodium chloride flush 0.9 % injection 5-40 mL  5-40 mL Intravenous 2 times per day Florentino Edmonds MD        sodium chloride flush 0.9 % injection 5-40 mL  5-40 mL Intravenous PRN Florentino Edmonds MD        0.9 % sodium chloride infusion  25 mL Intravenous PRN Florentino Edmonds MD        methylPREDNISolone sodium (SOLU-MEDROL) injection 40 mg  40 mg Intravenous Daily Kodak Tabares MD   40 mg at 21 0902    calcium-vitamin D (OSCAL-500) 500-200 MG-UNIT per tablet 2 tablet  2 tablet Oral BID Kodak Tabares MD   2 tablet at 21 1204    metoprolol succinate (TOPROL XL) extended release tablet 50 mg  50 mg Oral Daily Kodak Tabares MD   50 mg at 21 1205    ampicillin-sulbactam (UNASYN) 1500 mg IVPB minibag  1,500 mg Intravenous Q6H Kodak Tabares  mL/hr at 21 1333 1,500 mg at 21 1333    oxyCODONE-acetaminophen (PERCOCET)  MG per tablet 1 tablet  1 tablet Oral Q4H PRN Orin Resendez MD   1 tablet at 04/14/21 0902    HYDROcodone-acetaminophen (NORCO) 5-325 MG per tablet 1 tablet  1 tablet Oral Q6H PRN Orin Resendez MD   1 tablet at 04/13/21 2110    sodium chloride (Inhalant) 3 % nebulizer solution 4 mL  4 mL Nebulization PRN Orin Resendez MD        sodium chloride flush 0.9 % injection 10 mL  10 mL Intravenous 2 times per day ALYSIA Alexander - CNP   10 mL at 04/14/21 0902    sodium chloride flush 0.9 % injection 10 mL  10 mL Intravenous PRN ALYSIA Alexander - CNP   10 mL at 04/06/21 2240    0.9 % sodium chloride infusion  25 mL Intravenous PRN ALYSIA Alexander - CNP        promethazine (PHENERGAN) tablet 12.5 mg  12.5 mg Oral Q6H PRN Maricarmen Mendoza APRN - CNP   12.5 mg at 04/05/21 0849    Or    ondansetron (ZOFRAN) injection 4 mg  4 mg Intravenous Q6H PRN Maricarmen Mendoza, APRN - CNP   4 mg at 04/08/21 0617    polyethylene glycol (GLYCOLAX) packet 17 g  17 g Oral Daily PRN ALYSIA Alexander - CNP        acetaminophen (TYLENOL) tablet 650 mg  650 mg Oral Q6H PRN Maricarmen Mendoza, APRN - CNP   650 mg at 04/07/21 9715    Or    acetaminophen (TYLENOL) suppository 650 mg  650 mg Rectal Q6H PRN Maricarmen Mendoza APRN - CNP        ipratropium-albuterol (DUONEB) nebulizer solution 1 ampule  1 ampule Inhalation Q4H WA Maricarmen Mendoza APRN - CNP   1 ampule at 04/14/21 1131    albuterol (PROVENTIL) nebulizer solution 2.5 mg  2.5 mg Nebulization Q2H PRN ALYSIA Alexander - CNP   2.5 mg at 04/14/21 0135    buPROPion [facility] SR) extended release tablet 150 mg  150 mg Oral Daily Maricarmen Mendoza APRN - CNP   150 mg at 04/14/21 1204    dilTIAZem (CARDIZEM CD) extended release capsule 120 mg  120 mg Oral BID Maricarmen Lyell, APRN - CNP   120 mg at 04/14/21 1204    docusate sodium (COLACE) capsule 200 mg  200 mg Oral BID PRN Maricarmen Lyell, APRN - CNP   200 mg at 04/04/21 1109    budesonide-formoterol (SYMBICORT) 160-4.5 MCG/ACT inhaler 2 puff  2 puff Inhalation BID Alcira Parents, APRN - CNP   2 puff at 04/14/21 3505     Current Outpatient Medications on File Prior to Visit   Medication Sig Dispense Refill    metoprolol succinate (TOPROL XL) 100 MG extended release tablet TAKE ONE TABLET BY MOUTH DAILY **CALL TO MAKE APPOINTMENT WITH DOCTOR ** 90 tablet 0    buPROPion (WELLBUTRIN SR) 150 MG extended release tablet TAKE ONE TABLET BY MOUTH DAILY 90 tablet 0    fluticasone-salmeterol (ADVAIR) 250-50 MCG/DOSE AEPB INHALE ONE PUFF BY MOUTH TWICE A DAY 60 each 3    albuterol sulfate  (90 Base) MCG/ACT inhaler INHALE 2 PUFF(S) BY MOUTH EVERY 4 HOURS AS NEEDED FOR WHEEZING 8.5 g 0    tiZANidine (ZANAFLEX) 4 MG tablet TAKE ONE TABLET BY MOUTH THREE TIMES A DAY AS NEEDED 90 tablet 0    furosemide (LASIX) 20 MG tablet TAKE ONE TABLET BY MOUTH DAILY 90 tablet 0    hydroCHLOROthiazide (HYDRODIURIL) 25 MG tablet TAKE ONE TABLET BY MOUTH EVERY MORNING 90 tablet 0    Incontinence Supply Disposable (INCONTINENCE BRIEF LARGE) MISC As needed.  Approximately 150 per month 450 each 3    dilTIAZem (TIAZAC) 120 MG extended release capsule TAKE ONE CAPSULE BY MOUTH once daily in the evening (Patient taking differently: Take 120 mg by mouth 2 times daily TAKE ONE CAPSULE BY MOUTH once daily in the evening) 90 capsule 0    theophylline (UNIPHYL) 400 MG extended release tablet TAKE 1/2 TABLET BY MOUTH TWICE A DAY 90 tablet 0    nabumetone (RELAFEN) 750 MG tablet TAKE ONE TABLET BY MOUTH DAILY 90 tablet 0    Multiple Vitamins-Minerals (PRESERVISION AREDS PO) Take by mouth daily      Multiple Vitamins-Minerals (MULTIVITAMIN ADULTS PO) Take by mouth      vitamin C (ASCORBIC ACID) 500 MG tablet Take 500 mg by mouth daily      docusate sodium (STOOL SOFTENER) 250 MG capsule Take 250 mg by mouth 2 times daily as needed for Constipation      denosumab (PROLIA) 60 MG/ML SOSY SC injection Inject 1 mL into the skin once for 1 dose 1 mL 0    tiotropium (SPIRIVA HANDIHALER) 18 MCG inhalation capsule INHALE THE ENTIRE CONTENTS OF 1 CAPSULE ONCE A DAY USING HANDIHALER DEVICE 90 capsule 0    [DISCONTINUED] dilTIAZem (CARDIZEM CD) 120 MG extended release capsule TAKE ONE CAPSULE BY MOUTH TWICE A  capsule 1    acetaminophen (TYLENOL) 500 MG tablet Take 500 mg by mouth 3 times daily as needed.  aspirin 81 MG EC tablet Take 81 mg by mouth daily. No current facility-administered medications for this visit. No current outpatient medications on file.      Facility-Administered Medications Ordered in Other Visits   Medication Dose Route Frequency Provider Last Rate Last Admin    0.9 % sodium chloride infusion   Intravenous Continuous Sparkle Marie MD        sodium chloride flush 0.9 % injection 5-40 mL  5-40 mL Intravenous 2 times per day Sparkle Marie MD        sodium chloride flush 0.9 % injection 5-40 mL  5-40 mL Intravenous PRN Sparkle Marie MD        0.9 % sodium chloride infusion  25 mL Intravenous PRN Sparkle Marie MD        methylPREDNISolone sodium (SOLU-MEDROL) injection 40 mg  40 mg Intravenous Daily Tanesha Hardwick MD   40 mg at 04/14/21 0902    calcium-vitamin D (OSCAL-500) 500-200 MG-UNIT per tablet 2 tablet  2 tablet Oral BID Tanesha Hardwick MD   2 tablet at 04/14/21 1204    metoprolol succinate (TOPROL XL) extended release tablet 50 mg  50 mg Oral Daily Tanesha Hardwick MD   50 mg at 04/14/21 1205    ampicillin-sulbactam (UNASYN) 1500 mg IVPB minibag  1,500 mg Intravenous Q6H Tanesha Hardwick  mL/hr at 04/14/21 1333 1,500 mg at 04/14/21 1333    oxyCODONE-acetaminophen (PERCOCET)  MG per tablet 1 tablet  1 tablet Oral Q4H PRN Tanesha Hardwick MD   1 tablet at 04/14/21 0902    HYDROcodone-acetaminophen (NORCO) 5-325 MG per tablet 1 tablet  1 tablet Oral Q6H PRN Tanesha Hardwick MD   1 tablet at 04/13/21 2110    sodium chloride (Inhalant) 3 % nebulizer solution 4 mL  4 mL Nebulization PRN Heidy Turpin MD        sodium chloride flush 0.9 % injection 10 mL  10 mL Intravenous 2 times per day Charbely Dues, APRN - CNP   10 mL at 04/14/21 0902    sodium chloride flush 0.9 % injection 10 mL  10 mL Intravenous PRN Oswaldomey Dues, APRN - CNP   10 mL at 04/06/21 2240    0.9 % sodium chloride infusion  25 mL Intravenous PRN Charbely Dues, APRN - CNP        promethazine (PHENERGAN) tablet 12.5 mg  12.5 mg Oral Q6H PRN Oswaldomey Dues, APRN - CNP   12.5 mg at 04/05/21 2680    Or    ondansetron (ZOFRAN) injection 4 mg  4 mg Intravenous Q6H PRN Charbely Dues, APRN - CNP   4 mg at 04/08/21 0617    polyethylene glycol (GLYCOLAX) packet 17 g  17 g Oral Daily PRN Charbely Dues, APRN - CNP        acetaminophen (TYLENOL) tablet 650 mg  650 mg Oral Q6H PRN Charbely Dues, APRN - CNP   650 mg at 04/07/21 0639    Or    acetaminophen (TYLENOL) suppository 650 mg  650 mg Rectal Q6H PRN Charbely Dues, APRN - CNP        ipratropium-albuterol (DUONEB) nebulizer solution 1 ampule  1 ampule Inhalation Q4H WA Charbely Dues, APRN - CNP   1 ampule at 04/14/21 1131    albuterol (PROVENTIL) nebulizer solution 2.5 mg  2.5 mg Nebulization Q2H PRN Charbely Dues, APRN - CNP   2.5 mg at 04/14/21 0135    buPROPion [facility]) extended release tablet 150 mg  150 mg Oral Daily Jimmey Dues, APRN - CNP   150 mg at 04/14/21 1204    dilTIAZem (CARDIZEM CD) extended release capsule 120 mg  120 mg Oral BID Jimmey Dues, APRN - CNP   120 mg at 04/14/21 1204    docusate sodium (COLACE) capsule 200 mg  200 mg Oral BID PRN Oswaldomey Dues, APRN - CNP   200 mg at 04/04/21 1109    budesonide-formoterol (SYMBICORT) 160-4.5 MCG/ACT inhaler 2 puff  2 puff Inhalation BID ALYSIA Chacon - CNP   2 puff at 04/14/21 0588     Vital Signs (Current)   There were no vitals filed for this visit.   Vital Signs Statistics (for past 48 hrs)     Temp  Av.8 °F (36.6 °C)  Min: 97.3 °F (36.3 °C)   Min taken time: 21  Max: 98.9 °F (37.2 °C)   Max taken time: 21 1442  Pulse  Av.9  Min: 59   Min taken time: 21  Max: 110   Max taken time: 21 1202  Resp  Av.3  Min: 12   Min taken time: 21 0451  Max: 26   Max taken time: 21 0115  BP  Min: 124/70   Min taken time: 21  Max: 157/80   Max taken time: 21 2110  MAP (mmHg)  Av.8  Min: 80   Min taken time: 21  Max: 92   Max taken time: 21 1442  SpO2  Av.5 %  Min: 90 %   Min taken time: 21 0808  Max: 97 %   Max taken time: 21 1541    BP Readings from Last 3 Encounters:   21 127/71   21 120/72   20 (!) 152/67     BMI  There is no height or weight on file to calculate BMI. Estimated body mass index is 22.44 kg/m² as calculated from the following:    Height as of 21: 5' 7\" (1.702 m). Weight as of an earlier encounter on 21: 143 lb 4.8 oz (65 kg).     CBC   Lab Results   Component Value Date    WBC 10.6 2021    RBC 3.26 2021    HGB 10.0 2021    HCT 30.4 2021    MCV 93.2 2021    RDW 15.7 2021     2021     CMP    Lab Results   Component Value Date     2021    K 4.7 2021    K 3.3 2021     2021    CO2 30 2021    BUN 30 2021    CREATININE 0.7 2021    GFRAA >60 2021    GFRAA >60 2013    AGRATIO 0.8 2021    LABGLOM >60 2021    LABGLOM 54.3 2011    GLUCOSE 96 2021    GLUCOSE 99 2011    PROT 7.0 2021    PROT 7.4 2012    CALCIUM 9.4 2021    BILITOT 0.3 2021    ALKPHOS 142 2021    AST 10 2021    ALT <5 2021     BMP    Lab Results   Component Value Date     2021    K 4.7 2021    K 3.3 2021     2021    CO2 30 2021    BUN 30 2021    CREATININE 0.7 04/12/2021    CALCIUM 9.4 04/12/2021    GFRAA >60 04/12/2021    GFRAA >60 05/16/2013    LABGLOM >60 04/12/2021    LABGLOM 54.3 07/19/2011    GLUCOSE 96 04/12/2021    GLUCOSE 99 07/19/2011     POCGlucose  Recent Labs     04/12/21  0603   GLUCOSE 96      Coags    Lab Results   Component Value Date    PROTIME 11.4 04/14/2021    INR 0.98 04/14/2021    APTT 23.9 63/70/5052     HCG (If Applicable) No results found for: PREGTESTUR, PREGSERUM, HCG, HCGQUANT   ABGs No results found for: PHART, PO2ART, ICX9GLY, HKK0QDO, BEART, I1CHFGPY   Type & Screen (If Applicable)  No results found for: LABABO, LABRH                         BMI: Wt Readings from Last 3 Encounters:       NPO Status:                          Anesthesia Evaluation  Patient summary reviewed no history of anesthetic complications:   Airway: Mallampati: III  TM distance: >3 FB   Neck ROM: full   Dental:    (+) upper dentures      Pulmonary:   (+) COPD:  decreased breath sounds,  wheezes,  asthma:                            Cardiovascular:  Exercise tolerance: poor (<4 METS),   (+) hypertension:, dysrhythmias (PVCs):,       ECG reviewed  Rhythm: regular  Rate: normal           Beta Blocker:  Dose within 24 Hrs         Neuro/Psych:   (+) neuromuscular disease:, psychiatric history:            GI/Hepatic/Renal: Neg GI/Hepatic/Renal ROS            Endo/Other: Negative Endo/Other ROS                    Abdominal:           Vascular: negative vascular ROS. Anesthesia Plan      general     ASA 3     (Increased wisk for post op ventilation given current circumstances of COPD exacerbation.)  Induction: intravenous. MIPS: Postoperative opioids intended and Prophylactic antiemetics administered. Anesthetic plan and risks discussed with patient and child/children. Plan discussed with CRNA.               This pre-anesthesia assessment may be used as a history and physical.    DOS STAFF ADDENDUM:    Pt seen and examined, chart reviewed (including anesthesia, drug and allergy history). No interval changes to history and physical examination. Anesthetic plan, risks, benefits, alternatives, and personnel involved discussed with patient. Questions and concerns addressed. Patient(family) verbalized an understanding and agrees to proceed.       Sushant Parker MD  April 14, 2021  2:58 PM

## 2021-04-14 NOTE — PLAN OF CARE
Problem: Pain:  Goal: Pain level will decrease  Description: Pain level will decrease  Outcome: Ongoing     Problem: Pain:  Goal: Control of acute pain  Description: Control of acute pain  Outcome: Ongoing     Problem: Pain:  Goal: Control of chronic pain  Description: Control of chronic pain  Outcome: Ongoing     Problem: Falls - Risk of:  Goal: Will remain free from falls  Description: Will remain free from falls  Outcome: Ongoing     Problem: Falls - Risk of:  Goal: Absence of physical injury  Description: Absence of physical injury  Outcome: Ongoing     Problem: Skin Integrity:  Goal: Will show no infection signs and symptoms  Description: Will show no infection signs and symptoms  Outcome: Ongoing     Problem: Skin Integrity:  Goal: Absence of new skin breakdown  Description: Absence of new skin breakdown  Outcome: Ongoing     Problem: Breathing Pattern - Ineffective:  Goal: Ability to achieve and maintain a regular respiratory rate will improve  Description: Ability to achieve and maintain a regular respiratory rate will improve  Outcome: Ongoing     Problem: Infection:  Goal: Will remain free from infection  Description: Will remain free from infection  Outcome: Ongoing     Problem: Safety:  Goal: Free from accidental physical injury  Description: Free from accidental physical injury  Outcome: Ongoing     Problem: Safety:  Goal: Free from intentional harm  Description: Free from intentional harm  Outcome: Ongoing     Problem: Daily Care:  Goal: Daily care needs are met  Description: Daily care needs are met  Outcome: Ongoing     Problem: Discharge Planning:  Goal: Patients continuum of care needs are met  Description: Patients continuum of care needs are met  Outcome: Ongoing

## 2021-04-14 NOTE — PROGRESS NOTES
Hospitalist Progress Note      PCP: Katelyn Merritt MD    Date of Admission: 4/2/2021    Chief Complaint: Shortness of breath    Hospital Course: Patient is an 42-year-old female with a past medical history of hypertension, hyperlipidemia and COPD. She presented to the emergency department with a 4 to 5-day history of shortness of breath along with weakness and fatigue. 4/12 Patient states she still has back pain from her compression fracture. Discussed need for an MRI followed by kyphoplasty and she is agreeable. MRI ordered and will likely undergo kyphoplastyin next few days. Physical and Occupational Therapy are recommending 3-5 sessions per week. 4/13 Patient underwent MRI showing acute to subacute compression fracture of T9. Neurosurgery consulted for possible kyphoplasty tomorrow. Anesthesiologist consulted as patient still requiring oxygen and want to make sure she is stable for procedure. Subjective: Patient seen and examined. Patient without complaints. Scheduled for kyphoplasty later on today. Anesthesiology okay procedure. Likely discharge tomorrow to SNF.       Medications:  Reviewed    Infusion Medications    sodium chloride       Scheduled Medications    albuterol        albuterol        methylPREDNISolone  40 mg Intravenous Daily    calcium-vitamin D  2 tablet Oral BID    metoprolol succinate  50 mg Oral Daily    ampicillin-sulbactam  1,500 mg Intravenous Q6H    sodium chloride flush  10 mL Intravenous 2 times per day    ipratropium-albuterol  1 ampule Inhalation Q4H WA    buPROPion  150 mg Oral Daily    dilTIAZem  120 mg Oral BID    budesonide-formoterol  2 puff Inhalation BID     PRN Meds: oxyCODONE-acetaminophen, HYDROcodone 5 mg - acetaminophen, sodium chloride (Inhalant), sodium chloride flush, sodium chloride, promethazine **OR** ondansetron, polyethylene glycol, acetaminophen **OR** acetaminophen, albuterol, docusate sodium      Intake/Output Summary (Last 24 hours) at 4/14/2021 0857  Last data filed at 4/14/2021 0138  Gross per 24 hour   Intake 180 ml   Output --   Net 180 ml       Physical Exam Performed:    /70   Pulse 79   Temp 97.4 °F (36.3 °C) (Oral)   Resp 16   Ht 5' 7\" (1.702 m)   Wt 143 lb 4.8 oz (65 kg)   SpO2 95%   BMI 22.44 kg/m²     General appearance: No apparent distress, appears stated age and cooperative. HEENT: Pupils equal, round, and reactive to light. Conjunctivae/corneas clear. Neck: Supple, with full range of motion. No jugular venous distention. Trachea midline. Respiratory:  Normal respiratory effort. Clear to auscultation, bilaterally without Rales/Wheezes/Rhonchi. Cardiovascular: Regular rate and rhythm with normal S1/S2   Abdomen: Soft, non-tender, non-distended with normal bowel sounds. Musculoskeletal: No clubbing, cyanosis or edema bilaterally. Skin: Skin color, texture, turgor normal.  No rashes or lesions. Neurologic:  Neurovascularly intact without any focal sensory/motor deficits. Cranial nerves: II-XII intact, grossly non-focal.  Psychiatric: Alert and oriented, thought content appropriate, normal insight  Capillary Refill: Brisk,< 3 seconds   Peripheral Pulses: +2 palpable, equal bilaterally       Labs:   Recent Labs     04/12/21  0603   WBC 10.6   HGB 10.0*   HCT 30.4*        Recent Labs     04/12/21  0603      K 4.7      CO2 30   BUN 30*   CREATININE 0.7   CALCIUM 9.4   PHOS 1.6*     No results for input(s): AST, ALT, BILIDIR, BILITOT, ALKPHOS in the last 72 hours. Recent Labs     04/14/21  0618   INR 0.98     No results for input(s): Patito Anchors in the last 72 hours. Urinalysis:      Lab Results   Component Value Date    NITRU Negative 04/03/2021    WBCUA 8 04/03/2021    BACTERIA 3+ 04/03/2021    RBCUA 1 04/03/2021    BLOODU Negative 04/03/2021    SPECGRAV 1.026 04/03/2021    GLUCOSEU Negative 04/03/2021       Radiology:  MRI THORACIC SPINE WO CONTRAST   Final Result   1.  Acute to subacute compression fracture of T9 with approximately 50% loss   of height. 2-3 mm bulging of the posterior cortex contributes to mild spinal   canal stenosis. 2. Chronic compression deformities of the T5 through T7 vertebral bodies with   prior vertebroplasty of T7.  4 mm bulging of the posterior cortex of T7   contributing to mild-to-moderate spinal canal stenosis. 3. Focal kyphosis centered at T7.   4. No evidence of abnormal cord signal.   5. Degenerative changes of the thoracic spine contribute to multilevel neural   foraminal narrowing as above. 6. Moderate spinal canal stenosis is seen at L1-L2.   7. Moderate sized hiatal hernia with moderate bilateral pleural effusions. CT LUMBAR SPINE WO CONTRAST   Final Result   1. Negative for fracture   2. Lumbar degenerative disease         CT THORACIC SPINE WO CONTRAST   Final Result   1. New approximately 50% T9 compression fracture which appears acute/subacute   2. Stable old T5 through T7 compression fractures, status post T7   vertebroplasty         XR CHEST PORTABLE   Final Result   Pulmonary venous hypertension with mild edema. Bibasilar airspace disease   may represent edema and/or atelectasis. There is left pleural effusion as   well         FL ESOPHAGRAM   Final Result   Esophageal dysmotility with tertiary contractions and spasms. Small hiatal hernia with focal persistent defect compatible with what appears   to be a Schatzki's ring. Evaluation is limited due to patient's ability to   position for exam.         Fluoroscopy modified barium swallow with video   Final Result   Swallowing mechanism grossly within normal limits without evidence of   aspiration. Please see separate speech pathology report for full discussion of findings   and recommendations. XR CHEST PORTABLE   Final Result   No acute disease.                  Assessment/Plan:    COPD exacerbation  Resume Solu-Medrol, 3% normal saline  Unasyn IV  Patient may be silently aspirating due to esophageal dysmotility and possible stricture  Resolved    Acute respiratory failure with hypoxia  Secondary to COPD exacerbation  Down to 2 L    Dysphagia  MBS is normal with no signs of aspiration  Esophagram performed showing dysmotility with spasms, possible Schatzki ring  Already on calcium channel blocker  Will need EGD to investigate/treat this  Continues to have struggles with solid food  GI consulted, EGD if symptoms persist once COPD improved    Back pain  History of T7 compression fracture status post kyphoplasty  CT on this admission showing a new T9 compression fracture  Continue with pain control  MRI ordered  Neurosurgery consulted kyphoplasty scheduled for today    Generalized weakness  SNF versus home health at discharge  [facility] would like [facility]    RASHAWN  Creatinine up to 1.6 from 1  Likely prerenal in the setting of hypotension and NSAIDs  Resolved      DVT Prophylaxis: Aspirin  Diet: Diet NPO, After Midnight Exceptions are: Sips of Water with Meds  Code Status: Full Code    PT/OT Eval Status: 3-5 sessions per week    Dispo -inpatient, discharge to SNF tomorrow    Catia Morris MD

## 2021-04-14 NOTE — PROGRESS NOTES
Pt arouses to voice. Able to wiggle toes and squeeze fingers, pedal and radial pulses palpable. Cap refill less than 3. Pt tolerating biPAP well. Drowsy. Continue to monitor.

## 2021-04-14 NOTE — H&P
Update History & Physical    The patient's History and Physical of April 14, 2021 was reviewed with the patient and I examined the patient. There was no change. The surgical site was confirmed by the patient and me. Plan: The risks, benefits, expected outcome, and alternative to the recommended procedure have been discussed with the patient. Patient understands and wants to proceed with the procedure.      Electronically signed by Mian Mortensen MD on 4/14/2021 at 3:39 PM

## 2021-04-14 NOTE — PROGRESS NOTES
Pt to PACU from OR. Dr. Herson Winchester ordered bipap for pt in PACU. O2 sats 90-92% on 8 L nasal canula when arrived to PACU. Dressings have scant amount of serosanguinous drainage noted. Continue to monitor.

## 2021-04-14 NOTE — PROGRESS NOTES
Pt arrived to room 5111 from OR. Report received from Mohsen Mosqueda 43 and 4th floor RN. Pt alert and oriented. On 6L nasal cannula at this time. Pt denies pain. Pt oriented to room and instructed on use of call light. Pt denies needs at this time. Telemetry verified with CMU, pt in afib rate controlled.  The only belongings pt arrived to floor with were upper and lower dentures    Electronically signed by Vitaliy Celestin RN on 4/14/2021 at 6:29 PM

## 2021-04-14 NOTE — PROGRESS NOTES
4 Eyes Skin Assessment     NAME:  Rufina Meng  YOB: 1940  MEDICAL RECORD NUMBER:  [id number]    The patient is being assess for  Transfer to New Unit    I agree that 2 RN's have performed a thorough Head to Toe Skin Assessment on the patient. ALL assessment sites listed below have been assessed. Areas assessed by both nurses:    Head, Face, Ears, Shoulders, Back, Chest, Arms, Elbows, Hands, Sacrum. Buttock, Coccyx, Ischium and Legs. Feet and Heels        Does the Patient have a Wound?  No noted wound(s)       Demarcus Prevention initiated:  NA   Wound Care Orders initiated:  NA    Pressure Injury (Stage 3,4, Unstageable, DTI, NWPT, and Complex wounds) if present place consult order under [de-identified] NA    New and Established Ostomies if present place consult order under : NA      Nurse 1 eSignature: Electronically signed by Melissa Camacho RN on 4/14/21 at 6:32 PM EDT    **SHARE this note so that the co-signing nurse is able to place an eSignature**    Nurse 2 eSignature: Electronically signed by Ghazala Proctor RN on 4/14/21 at 10:31 PM EDT

## 2021-04-15 VITALS
HEIGHT: 67 IN | BODY MASS INDEX: 22.32 KG/M2 | OXYGEN SATURATION: 94 % | TEMPERATURE: 97.5 F | RESPIRATION RATE: 18 BRPM | DIASTOLIC BLOOD PRESSURE: 69 MMHG | HEART RATE: 92 BPM | WEIGHT: 142.2 LBS | SYSTOLIC BLOOD PRESSURE: 122 MMHG

## 2021-04-15 LAB
EKG ATRIAL RATE: 144 BPM
EKG DIAGNOSIS: NORMAL
EKG Q-T INTERVAL: 326 MS
EKG QRS DURATION: 80 MS
EKG QTC CALCULATION (BAZETT): 424 MS
EKG R AXIS: 70 DEGREES
EKG T AXIS: 36 DEGREES
EKG VENTRICULAR RATE: 102 BPM

## 2021-04-15 PROCEDURE — 94761 N-INVAS EAR/PLS OXIMETRY MLT: CPT

## 2021-04-15 PROCEDURE — 6370000000 HC RX 637 (ALT 250 FOR IP): Performed by: NEUROLOGICAL SURGERY

## 2021-04-15 PROCEDURE — 97535 SELF CARE MNGMENT TRAINING: CPT

## 2021-04-15 PROCEDURE — 93010 ELECTROCARDIOGRAM REPORT: CPT | Performed by: INTERNAL MEDICINE

## 2021-04-15 PROCEDURE — 97530 THERAPEUTIC ACTIVITIES: CPT | Performed by: PHYSICAL THERAPIST

## 2021-04-15 PROCEDURE — 94640 AIRWAY INHALATION TREATMENT: CPT

## 2021-04-15 PROCEDURE — 6370000000 HC RX 637 (ALT 250 FOR IP): Performed by: INTERNAL MEDICINE

## 2021-04-15 PROCEDURE — 97116 GAIT TRAINING THERAPY: CPT | Performed by: PHYSICAL THERAPIST

## 2021-04-15 PROCEDURE — 6360000002 HC RX W HCPCS: Performed by: NEUROLOGICAL SURGERY

## 2021-04-15 PROCEDURE — 97530 THERAPEUTIC ACTIVITIES: CPT

## 2021-04-15 PROCEDURE — 2580000003 HC RX 258: Performed by: NEUROLOGICAL SURGERY

## 2021-04-15 PROCEDURE — 2700000000 HC OXYGEN THERAPY PER DAY

## 2021-04-15 RX ORDER — HYDROCODONE BITARTRATE AND ACETAMINOPHEN 5; 325 MG/1; MG/1
1 TABLET ORAL EVERY 6 HOURS PRN
Qty: 12 TABLET | Refills: 0 | Status: SHIPPED | OUTPATIENT
Start: 2021-04-15 | End: 2021-04-18

## 2021-04-15 RX ORDER — AMOXICILLIN AND CLAVULANATE POTASSIUM 875; 125 MG/1; MG/1
1 TABLET, FILM COATED ORAL 2 TIMES DAILY
Qty: 10 TABLET | Refills: 0 | Status: ON HOLD | DISCHARGE
Start: 2021-04-15 | End: 2021-04-21 | Stop reason: HOSPADM

## 2021-04-15 RX ORDER — CALCIUM CARBONATE 200(500)MG
500 TABLET,CHEWABLE ORAL 3 TIMES DAILY PRN
Status: DISCONTINUED | OUTPATIENT
Start: 2021-04-15 | End: 2021-04-15 | Stop reason: HOSPADM

## 2021-04-15 RX ADMIN — DILTIAZEM HYDROCHLORIDE 120 MG: 120 CAPSULE, COATED, EXTENDED RELEASE ORAL at 08:43

## 2021-04-15 RX ADMIN — BUDESONIDE AND FORMOTEROL FUMARATE DIHYDRATE 2 PUFF: 160; 4.5 AEROSOL RESPIRATORY (INHALATION) at 07:40

## 2021-04-15 RX ADMIN — ANTACID TABLETS 500 MG: 500 TABLET, CHEWABLE ORAL at 12:57

## 2021-04-15 RX ADMIN — BUPROPION HYDROCHLORIDE 150 MG: 150 TABLET, EXTENDED RELEASE ORAL at 08:43

## 2021-04-15 RX ADMIN — SODIUM CHLORIDE 1500 MG: 900 INJECTION INTRAVENOUS at 01:30

## 2021-04-15 RX ADMIN — SODIUM CHLORIDE, PRESERVATIVE FREE 10 ML: 5 INJECTION INTRAVENOUS at 08:44

## 2021-04-15 RX ADMIN — METHYLPREDNISOLONE SODIUM SUCCINATE 40 MG: 40 INJECTION, POWDER, FOR SOLUTION INTRAMUSCULAR; INTRAVENOUS at 08:44

## 2021-04-15 RX ADMIN — OYSTER SHELL CALCIUM WITH VITAMIN D 2 TABLET: 500; 200 TABLET, FILM COATED ORAL at 08:43

## 2021-04-15 RX ADMIN — METOPROLOL SUCCINATE 50 MG: 50 TABLET, EXTENDED RELEASE ORAL at 08:44

## 2021-04-15 RX ADMIN — SODIUM CHLORIDE 1500 MG: 900 INJECTION INTRAVENOUS at 06:34

## 2021-04-15 RX ADMIN — IPRATROPIUM BROMIDE AND ALBUTEROL SULFATE 1 AMPULE: .5; 3 SOLUTION RESPIRATORY (INHALATION) at 16:35

## 2021-04-15 RX ADMIN — SODIUM CHLORIDE 1500 MG: 900 INJECTION INTRAVENOUS at 12:36

## 2021-04-15 RX ADMIN — IPRATROPIUM BROMIDE AND ALBUTEROL SULFATE 1 AMPULE: .5; 3 SOLUTION RESPIRATORY (INHALATION) at 07:40

## 2021-04-15 RX ADMIN — IPRATROPIUM BROMIDE AND ALBUTEROL SULFATE 1 AMPULE: .5; 3 SOLUTION RESPIRATORY (INHALATION) at 11:26

## 2021-04-15 RX ADMIN — ANTACID TABLETS 500 MG: 500 TABLET, CHEWABLE ORAL at 04:53

## 2021-04-15 ASSESSMENT — PAIN SCALES - GENERAL: PAINLEVEL_OUTOF10: 0

## 2021-04-15 NOTE — PROGRESS NOTES
Physical Therapy  Facility/Department: \[facility]\"" 5 PROGRESSIVE CARE  Daily Treatment Note/Re-evaluation  NAME: Candice Myers  : 1940  MRN: [id number]    Date of Service: 4/15/2021    Discharge Recommendations:  Patient would benefit from continued therapy after discharge, 3-5 sessions per week   PT Equipment Recommendations  Equipment Needed: Yes  Mobility Devices: Shon Mustard: Rolling  Other: defer to next level of care  Candice Myers scored a 13/24 on the AM-PAC short mobility form. Current research shows that an AM-PAC score of 17 or less is typically not associated with a discharge to the patient's home setting. Based on the patient's AM-PAC score and their current functional mobility deficits, it is recommended that the patient have 3-5 sessions per week of Physical Therapy at d/c to increase the patient's independence. Please see assessment section for further patient specific details. If patient discharges prior to next session this note will serve as a discharge summary. Please see below for the latest assessment towards goals. Assessment   Body structures, Functions, Activity limitations: Decreased functional mobility   Assessment: pt is s/p T9 kyphoplasty; she is well below her baseline of living alone and being Ind with a SPC. Pt is a high fall risk and currently needing assist of 1-2 for functional mobility tasks; she has early fatigue and isn't walking far enough for household distances; Recommend continued therapy 3-5 session per week to assist pt in attaining her max potential and returning home when able  Prognosis: Fair  Clinical Presentation: evolving  PT Education: General Safety  Barriers to Learning: SOB  REQUIRES PT FOLLOW UP: Yes  Activity Tolerance  Activity Tolerance: Patient limited by endurance     Patient Diagnosis(es): The primary encounter diagnosis was COPD exacerbation ([facility] [address]  Diagnoses of Hypoxia, Shortness of breath, and Fatigue, unspecified type were also pertinent to this visit. has a past medical history of HTN (hypertension), Hypercholesterolemia, MAC (mycobacterium avium-intracellulare complex), Osteoarthritis, Osteopetrosis, Papanicolaou smear of cervix with atypical squamous cells of undetermined significance (ASC-US), Shingles, and Stress incontinence. has a past surgical history that includes Cholecystectomy; Breast surgery; Cataract removal (Left, 2009); Inguinal hernia repair (Right, 2015); Spine surgery (N/A, 11/25/2020); CT BIOPSY BONE MARROW (2/12/2021); and Spine surgery (N/A, 4/14/2021). Restrictions  Restrictions/Precautions  Restrictions/Precautions: Fall Risk  Position Activity Restriction  Other position/activity restrictions: 5L O2; IV  Subjective   General  Chart Reviewed: Yes  Additional Pertinent Hx: Per H&P on 4-2-2021 of Cm Serna MD: The pt is an 77 yo female who came to the ED with 4-5 day h/o of increasing SOB, weakness and fatigue. The pt was found to have O2 sats of 88% on room air. PMHx: HTN, OA, shingles, T7 kyphopasty (2020), T9 kyphoplasty and bone biopsy 4-14-21  Response To Previous Treatment: Patient with no complaints from previous session. Family / Caregiver Present: No  Referring Practitioner: ALYSIA Chacon CNP  Subjective  Subjective: pt very pleasant; agreeable to getting up with therapy; reports some discomfort in her back s/p kyphoplasty          Orientation  Orientation  Overall Orientation Status: Impaired  Orientation Level: Oriented to person;Oriented to place;Oriented to situation;Disoriented to time  Cognition   Cognition  Overall Cognitive Status: Exceptions  Arousal/Alertness: Appropriate responses to stimuli  Following Commands:  Follows all commands without difficulty  Attention Span: Appears intact  Memory: Appears intact  Safety Judgement: Decreased awareness of need for assistance  Insights: Decreased awareness of deficits  Initiation: Does not require cues  Sequencing: Does not fatigue level  Current Treatment Recommendations: Functional Mobility Training, Transfer Training, Gait Training, Strengthening, ROM  Safety Devices  Type of devices: Call light within reach, Chair alarm in place, Gait belt, Patient at risk for falls, Left in chair, Nurse notified     Therapy Time   Individual Concurrent Group Co-treatment   Time In 1003         Time Out 1104         Minutes 61                 DELVIN ORTEZ, PT    [address] [facility],Third Floor, [address] [facility]

## 2021-04-15 NOTE — PROGRESS NOTES
Occupational Therapy   Occupational Therapy Re-evaluation, Treatment, and Tentative D/C    Date: 4/15/2021   Patient Name: Salome White  MRN: [id number]     : 1940    Date of Service: 4/15/2021    Discharge Recommendations: Salome White scored a 14/24 on the AM-PAC ADL Inpatient form. Current research shows that an AM-PAC score of 17 or less is typically not associated with a discharge to the patient's home setting. Based on the patient's AM-PAC score and their current ADL deficits, it is recommended that the patient have 3-5 sessions per week of Occupational Therapy at d/c to increase the patient's independence. Please see assessment section for further patient specific details. If patient discharges prior to next session this note will serve as a discharge summary. Please see below for the latest assessment towards goals. Continue to assess pending progress, 3-5 sessions per week  OT Equipment Recommendations  Equipment Needed: No    Assessment   Performance deficits / Impairments: Decreased functional mobility ; Decreased strength;Decreased ADL status; Decreased endurance;Decreased balance;Decreased high-level IADLs;Decreased safe awareness  Assessment: Pt with increased fatigue and overall weakness this date. Pt currently requires Min/Mod Ax2 and use of RW for transfers with use of RW. Pt requires Min A for ambulating short distances ~10ft. Pt's SpO2 >90% although on 5L O2 and HR increasing into 130s. Anticipate pt needing up to Max A for ADLs. Pt will continue to benefit from skilled OT services at this time. Anticipate pt with need for placement at time of D/C. Prognosis: Good  Assistance / Modification: RW  OT Education: OT Role;Plan of Care;Transfer Training;Energy Conservation; ADL Adaptive Strategies  REQUIRES OT FOLLOW UP: Yes  Activity Tolerance  Activity Tolerance: Patient limited by fatigue  Safety Devices  Safety Devices in place: Yes  Type of devices: Call light within reach;Nurse notified;Gait belt; All fall risk precautions in place; Chair alarm in place; Left in chair           Patient Diagnosis(es): The primary encounter diagnosis was COPD exacerbation ([facility] [address] Diagnoses of Hypoxia, Shortness of breath, and Fatigue, unspecified type were also pertinent to this visit. has a past medical history of HTN (hypertension), Hypercholesterolemia, MAC (mycobacterium avium-intracellulare complex), Osteoarthritis, Osteopetrosis, Papanicolaou smear of cervix with atypical squamous cells of undetermined significance (ASC-US), Shingles, and Stress incontinence. has a past surgical history that includes Cholecystectomy; Breast surgery; Cataract removal (Left, 2009); Inguinal hernia repair (Right, 2015); Spine surgery (N/A, 11/25/2020); CT BIOPSY BONE MARROW (2/12/2021); and Spine surgery (N/A, 4/14/2021). Restrictions  Restrictions/Precautions  Restrictions/Precautions: Fall Risk  Position Activity Restriction  Other position/activity restrictions: 5L O2; IV    Subjective   General  Chart Reviewed: Yes  Patient assessed for rehabilitation services?: Yes  Additional Pertinent Hx: per ED note, Levi Osei is a 78 y.o. female with medical history of asthma, hypertension, tobacco abuse, COPD, renal insufficiency, cataracts, hypercholesterolemia, stress incontinence, macular degeneration, psoriasis, OA, IBS, pulmonary nodule right, frequent PVCs, adjustment disorder with anxious mood, sciatica, osteoporosis, colon polyp, venous insufficiency of bilateral lower extremities, uterovaginal prolapse, inguinal hernia, chronic constipation, compression fracture of T7, senile osteoporosis, shingles, cholecystectomy who presents the ED with complaints of aggressive weakness and shortness of air that is been ongoing for the past few days.   Patient does live in an apartment home alone and states usually the family has been coming over to help take care of her, bathe her, cook for work, and Right  Bathroom Shower/Tub: Walk-in shower, Tub/Shower unit, Shower chair with back(cleans up at sink)  Bathroom Toilet: Standard(with raised toilet seat with rails)  Bathroom Equipment: Hand-held shower, Shower chair  Home Equipment: Cane, Standard walker, Alert Button  ADL Assistance: Independent  Homemaking Assistance: Needs assistance(daughter assist with laundry, cleaning, grocery shopping)  Ambulation Assistance: Independent(uses cane)  Transfer Assistance: Independent(uses cane)  Active : No       Objective   Vision: Impaired  Vision Exceptions: Wears glasses at all times; Wears glasses for reading  Hearing: Within functional limits    Orientation  Overall Orientation Status: Within Functional Limits     Balance  Sitting Balance: Stand by assistance  Standing Balance: Minimal assistance(RW)  Functional Mobility  Functional - Mobility Device: Rolling Walker  Activity: Other; To/from bathroom(short household distances in room; ~10ft x3 reps)  Assist Level: Minimal assistance  Functional Mobility Comments: no reports of dizziness or light headedness; pt's SpO2 >90% throughout; pt reports decreased overall endurance; HR increasing into 130s  Toilet Transfers  Toilet - Technique: Ambulating(RW)  Equipment Used: Grab bars  Toilet Transfer: 2 Person assistance; Moderate assistance;Minimal assistance  Toilet Transfers Comments: RW, cues for hand placement  Wheelchair Bed Transfers  Wheelchair/Bed - Technique: Ambulating(RW)  Equipment Used: Bed;Other(bed to chair)  Level of Asssistance: 2 Person assistance;Minimal assistance; Moderate assistance  ADL  LE Dressing: Maximum assistance(assist to don bilateral socks; assist to don/doff brief; Min A for balance)  Toileting: Maximum assistance(assist for brief management; assist for pericare; Min A for balance in stance with RW)  Additional Comments: Anticipate pt needing up to Max A for ADLs including dressing, bathing, and toileting based on ROM, strength, balance, and endurance  Tone RUE  RUE Tone: Normotonic  Tone LUE  LUE Tone: Normotonic  Coordination  Movements Are Fluid And Coordinated: Yes        Transfers  Sit to stand: 2 Person assistance;Minimal assistance; Moderate assistance  Stand to sit: 2 Person assistance; Moderate assistance;Minimal assistance  Transfer Comments: to/from RW; cues for hand placement     Cognition  Overall Cognitive Status: Exceptions  Arousal/Alertness: Appropriate responses to stimuli  Following Commands:  Follows all commands without difficulty  Attention Span: Appears intact  Memory: Appears intact  Safety Judgement: Decreased awareness of need for assistance  Insights: Decreased awareness of deficits  Initiation: Does not require cues  Sequencing: Does not require cues                 LUE AROM (degrees)  LUE AROM : WFL  RUE AROM (degrees)  RUE AROM : WFL             Plan   Plan  Times per week: 3-5x  Current Treatment Recommendations: Strengthening, Functional Mobility Training, Gait Training, Endurance Training, Balance Training, Self-Care / ADL, Safety Education & Training, Patient/Caregiver Education & Training, Equipment Evaluation, Education, & procurement      AM-PAC Score        AM-PAC Inpatient Daily Activity Raw Score: 14 (04/15/21 1103)  AM-PAC Inpatient ADL T-Scale Score : 33.39 (04/15/21 1103)  ADL Inpatient CMS 0-100% Score: 59.67 (04/15/21 1103)  ADL Inpatient CMS G-Code Modifier : CK (04/15/21 1103)    Goals  Short term goals  Time Frame for Short term goals: prior to D/C: All goals ongoing  Short term goal 1: complete functional mobility and transfers Mod Ind  Short term goal 2: complete bathing and dressing Mod Ind  Short term goal 3: complete toileting Mod Ind  Short term goal 4: complete grooming at sink [address] term goals  Time Frame for Long term goals : STG=LTG  Patient Goals   Patient goals : return home       Therapy Time   Individual Concurrent Group Co-treatment   Time In 1007         Time Out 1102         Minutes 55         Timed Code Treatment Minutes: [address] [facility] , OTR/L

## 2021-04-15 NOTE — DISCHARGE SUMMARY
repeat kyphoplasty which was performed on April 14. She continued to have generalized weakness and fatigue and physical and Occupational Therapy were both recommending 3-5 sessions of therapy per week so a SNF was found and [facility]. COPD exacerbation  Resume Solu-Medrol, 3% normal saline  Unasyn IV  Patient may be silently aspirating due to esophageal dysmotility and possible stricture  Resolved     Acute respiratory failure with hypoxia  Secondary to COPD exacerbation  Down to 2 L at DC     Dysphagia  MBS is normal with no signs of aspiration  Esophagram performed showing dysmotility with spasms, possible Schatzki ring  Already on calcium channel blocker  Will need EGD to investigate/treat this  Continues to have struggles with solid food  GI consulted, EGD if symptoms persist once COPD improved     Back pain  History of T7 compression fracture status post kyphoplasty  CT on this admission showing a new T9 compression fracture  Continue with pain control  MRI ordered  Neurosurgery performed kypho 4/14     Generalized weakness  SNF versus home health at discharge  [facility] would [facility]     RASHAWN  Creatinine up to 1.6 from 1  Likely prerenal in the setting of hypotension and NSAIDs  Resolved         Exam:     /85   Pulse 102   Temp 98 °F (36.7 °C) (Oral)   Resp 18   Ht 5' 7\" (1.702 m)   Wt 142 lb 3.2 oz (64.5 kg)   SpO2 96%   BMI 22.27 kg/m²     General appearance: No apparent distress, appears stated age and cooperative. HEENT: Pupils equal, round, and reactive to light. Conjunctivae/corneas clear. Neck: Supple, with full range of motion. No jugular venous distention. Trachea midline. Respiratory:  Normal respiratory effort. LCTA BL  Cardiovascular: Regular rate and rhythm with normal S1/S2   Abdomen: Soft, non-tender, non-distended with normal bowel sounds. Musculoskeletal: No clubbing, cyanosis or edema bilaterally.   Full range of motion without effusion as   well         FL ESOPHAGRAM   Final Result   Esophageal dysmotility with tertiary contractions and spasms. Small hiatal hernia with focal persistent defect compatible with what appears   to be a Schatzki's ring. Evaluation is limited due to patient's ability to   position for exam.         Fluoroscopy modified barium swallow with video   Final Result   Swallowing mechanism grossly within normal limits without evidence of   aspiration. Please see separate speech pathology report for full discussion of findings   and recommendations. XR CHEST PORTABLE   Final Result   No acute disease. Disposition: SNF    Condition at Discharge: Stable    Discharge Instructions/Follow-up: PCP    Code Status:  Full Code     Activity: activity as tolerated    Diet: regular diet      Labs: For convenience and continuity at follow-up the following most recent labs are provided:      CBC:    Lab Results   Component Value Date    WBC 10.6 04/12/2021    HGB 10.0 04/12/2021    HCT 30.4 04/12/2021     04/12/2021       Renal:    Lab Results   Component Value Date     04/12/2021    K 4.7 04/12/2021    K 3.3 04/03/2021     04/12/2021    CO2 30 04/12/2021    BUN 30 04/12/2021    CREATININE 0.7 04/12/2021    CALCIUM 9.4 04/12/2021    PHOS 1.6 04/12/2021       Discharge Medications:     Current Discharge Medication List           Details   HYDROcodone-acetaminophen (NORCO) 5-325 MG per tablet Take 1 tablet by mouth every 6 hours as needed for Pain for up to 3 days.   Qty: 12 tablet, Refills: 0    Comments: Reduce doses taken as pain becomes manageable  Associated Diagnoses: Closed wedge compression fracture of T9 vertebra, initial encounter ([facility])      amoxicillin-clavulanate (AUGMENTIN) 875-125 MG per tablet Take 1 tablet by mouth 2 times daily for 5 days  Qty: 10 tablet, Refills: 0              Details   metoprolol succinate (TOPROL XL) 100 MG extended release tablet TAKE ONE TABLET BY MOUTH DAILY **CALL TO MAKE APPOINTMENT WITH DOCTOR **  Qty: 90 tablet, Refills: 0    Associated Diagnoses: Essential hypertension      buPROPion (WELLBUTRIN SR) 150 MG extended release tablet TAKE ONE TABLET BY MOUTH DAILY  Qty: 90 tablet, Refills: 0    Associated Diagnoses: Adjustment disorder with anxious mood      fluticasone-salmeterol (ADVAIR) 250-50 MCG/DOSE AEPB INHALE ONE PUFF BY MOUTH TWICE A DAY  Qty: 60 each, Refills: 3    Associated Diagnoses: Pulmonary emphysema, unspecified emphysema type (HCC)      albuterol sulfate  (90 Base) MCG/ACT inhaler INHALE 2 PUFF(S) BY MOUTH EVERY 4 HOURS AS NEEDED FOR WHEEZING  Qty: 8.5 g, Refills: 0    Associated Diagnoses: Pulmonary emphysema, unspecified emphysema type ([address] Moderate persistent asthma without complication      tiZANidine (ZANAFLEX) 4 MG tablet TAKE ONE TABLET BY MOUTH THREE TIMES A DAY AS NEEDED  Qty: 90 tablet, Refills: 0      furosemide (LASIX) 20 MG tablet TAKE ONE TABLET BY MOUTH DAILY  Qty: 90 tablet, Refills: 0    Associated Diagnoses: Venous insufficiency of both lower extremities      dilTIAZem (TIAZAC) 120 MG extended release capsule TAKE ONE CAPSULE BY MOUTH once daily in the evening  Qty: 90 capsule, Refills: 0    Associated Diagnoses: Essential hypertension      theophylline (UNIPHYL) 400 MG extended release tablet TAKE 1/2 TABLET BY MOUTH TWICE A DAY  Qty: 90 tablet, Refills: 0    Associated Diagnoses: Pulmonary emphysema, unspecified emphysema type ([address]      ! ! Multiple Vitamins-Minerals (PRESERVISION AREDS PO) Take by mouth daily      !!  Multiple Vitamins-Minerals (MULTIVITAMIN ADULTS PO) Take by mouth      vitamin C (ASCORBIC ACID) 500 MG tablet Take 500 mg by mouth daily      docusate sodium (STOOL SOFTENER) 250 MG capsule Take 250 mg by mouth 2 times daily as needed for Constipation      tiotropium (SPIRIVA HANDIHALER) 18 MCG inhalation capsule INHALE THE ENTIRE CONTENTS OF 1 CAPSULE ONCE A DAY USING HANDIHALER DEVICE  Qty: 90 capsule, Refills: 0    Associated Diagnoses: Pulmonary emphysema, unspecified emphysema type (HCC)      acetaminophen (TYLENOL) 500 MG tablet Take 500 mg by mouth 3 times daily as needed. aspirin 81 MG EC tablet Take 81 mg by mouth daily. Incontinence Supply Disposable (INCONTINENCE BRIEF LARGE) MISC As needed. Approximately 150 per month  Qty: 450 each, Refills: 3    Associated Diagnoses: Uterovaginal prolapse; Continuous leakage of urine      nabumetone (RELAFEN) 750 MG tablet TAKE ONE TABLET BY MOUTH DAILY  Qty: 90 tablet, Refills: 0    Associated Diagnoses: Primary osteoarthritis, unspecified site      denosumab (PROLIA) 60 MG/ML SOSY SC injection Inject 1 mL into the skin once for 1 dose  Qty: 1 mL, Refills: 0    Associated Diagnoses: Compression fracture of T7 vertebra, initial encounter ([facility] [place] [address] Age-related osteoporosis without current pathological fracture       !! - Potential duplicate medications found. Please discuss with provider. No future appointments. Time Spent on discharge is more than 30 minutes in the examination, evaluation, counseling and review of medications and discharge plan. Signed:    Renetta Osei MD   4/15/2021      Thank you Marisol Lemus MD for the opportunity to be involved in this patient's care. If you have any questions or concerns please feel free to contact me at [phone].

## 2021-04-15 NOTE — PROGRESS NOTES
[address] Day: 14  POD #1  Pre-Op Diagnosis: AGE RELATED OSTEOPOROSIS FRACTURE  Operation: Procedure(s):  T9 KYPHOPLASTY WITH BONE BIOPSY   /67   Pulse 105   Temp 97.8 °F (36.6 °C) (Oral)   Resp 18   Ht 5' 7\" (1.702 m)   Wt 142 lb 3.2 oz (64.5 kg)   SpO2 95%   BMI 22.27 kg/m²     Patient admtited for exacerbation of COPD, hypoxia, back pain. CT imaging uncovered T9 compression fracture. Underwent Kyphoplasty T9 yesterday. Monitor showed atrial fib in PACU and is current rhythm with rate . /67. On 5L O2/NC. Pt sitting in the bedside chair. Dyspnea even at rest. Enjoyed breakfast of eggs, duong and yogurt. \"Indigestion from sweets. \"  Morrell. Pre-operative back pain \"seems gone. \" \"It is hard to tell. \"  Complaining of right anterior rib/lower chest wall pain with cough, 4/10. Pain controlled on current medications. Gross motor strength in all myotomes bilateral lower extremities. Able to stand with assistance to get to the chair. Appears weak in general.   Sensation intact in all dermatomes bilateral lower extremities. Dressing clean, dry and intact. PT/OT asking for updated order to treat. Labs reviewed. Med list and MAR reviewed. Dr. Stefan Daniels updated. Ok to resume PT/OT.      Electronically signed by Mayela Layton RN on 4/15/21 at 11:15 AM EDT

## 2021-04-15 NOTE — CARE COORDINATION
DISCHARGE SUMMARY     DATE OF DISCHARGE:  4/15/2021     DISCHARGE DESTINATION:  SNF    FACILITY    Level of Care: Skilled     Report Number: [phone]  Fax Number:  [phone]    Hens completed  Yes     TRANSPORTATION: [address] Name:  [address] [place] [address] up Time:  6:30 pm     Phone Number:  [phone]    COMMENTS:  Case Management:  Received discharge order to go to [facility] today. Notified facility and was informed that the insurance has  and they will have to submit a new one. Alex is not sure they will get authorization back today but will set up transport just in case. Notified patient and daughter Blake Mems of discharge and pending insurance, with possible transport at 6:30 pm tonight or tomorrow both agreed. Notified bedside nurse. COVID test done yesterday, negative. Electronically signed by Torie Mcgregor on 4/15/2021 at 3:03 PM     Case Management:  Insurance authorization completed and approved for discharge today, patient , bedside nurse and daughter Blake Mems made aware.   Electronically signed by Torie Mcgregor on 4/15/2021 at 4:26 PM

## 2021-04-15 NOTE — PLAN OF CARE
Problem: Pain:  Goal: Pain level will decrease  Description: Pain level will decrease  4/14/2021 2331 by Juan Livingston RN  Outcome: Ongoing  4/14/2021 1247 by Bernardo Ross RN  Outcome: Ongoing  Goal: Control of acute pain  Description: Control of acute pain  4/14/2021 2331 by Juan Livingston RN  Outcome: Ongoing  4/14/2021 1247 by Bernardo Ross RN  Outcome: Ongoing  Goal: Control of chronic pain  Description: Control of chronic pain  4/14/2021 2331 by Juan Livingston RN  Outcome: Ongoing  4/14/2021 1247 by Bernardo Ross RN  Outcome: Ongoing     Problem: Falls - Risk of:  Goal: Will remain free from falls  Description: Will remain free from falls  4/14/2021 2331 by Juan Livingston RN  Outcome: Ongoing  4/14/2021 1247 by Bernardo Ross RN  Outcome: Ongoing  Goal: Absence of physical injury  Description: Absence of physical injury  4/14/2021 2331 by Juan Livingston RN  Outcome: Ongoing  4/14/2021 1247 by Bernardo Ross RN  Outcome: Ongoing     Problem: Skin Integrity:  Goal: Will show no infection signs and symptoms  Description: Will show no infection signs and symptoms  4/14/2021 2331 by Juan Livingston RN  Outcome: Ongoing  4/14/2021 1247 by Bernardo Ross RN  Outcome: Ongoing  Goal: Absence of new skin breakdown  Description: Absence of new skin breakdown  4/14/2021 2331 by Juan Livingston RN  Outcome: Ongoing  4/14/2021 1247 by Bernardo Ross RN  Outcome: Ongoing     Problem: Breathing Pattern - Ineffective:  Goal: Ability to achieve and maintain a regular respiratory rate will improve  Description: Ability to achieve and maintain a regular respiratory rate will improve  4/14/2021 2331 by Juan Livingston RN  Outcome: Ongoing  4/14/2021 1247 by Bernardo Ross RN  Outcome: Ongoing     Problem: Infection:  Goal: Will remain free from infection  Description: Will remain free from infection  4/14/2021 2331 by Juan Livingston RN  Outcome: Ongoing  4/14/2021 1247 by Brant Dewitt Gurinder Ascencio RN  Outcome: Ongoing     Problem: Safety:  Goal: Free from accidental physical injury  Description: Free from accidental physical injury  4/14/2021 2331 by David Cobian RN  Outcome: Ongoing  4/14/2021 1247 by Danitza Tobin RN  Outcome: Ongoing  Goal: Free from intentional harm  Description: Free from intentional harm  4/14/2021 2331 by David Cobian RN  Outcome: Ongoing  4/14/2021 1247 by Danitza Tobin RN  Outcome: Ongoing     Problem: Daily Care:  Goal: Daily care needs are met  Description: Daily care needs are met  4/14/2021 2331 by David Cobian RN  Outcome: Ongoing  4/14/2021 1247 by Danitza Tobin RN  Outcome: Ongoing     Problem: Discharge Planning:  Goal: Patients continuum of care needs are met  Description: Patients continuum of care needs are met  4/14/2021 2331 by David Cobian RN  Outcome: Ongoing  4/14/2021 1247 by Danitza Tobin RN  Outcome: Ongoing     Problem: Nutrition  Goal: Optimal nutrition therapy  4/14/2021 2331 by David Cobian RN  Outcome: Ongoing  4/14/2021 1247 by Danitza Tobin RN  Outcome: Ongoing

## 2021-04-15 NOTE — OP NOTE
[address] [place] [address]                                OPERATIVE REPORT    PATIENT NAME: Pauline Chamberlain                  :        1940  MED REC NO:   [id number]                          ROOM:       5111  ACCOUNT NO:   [de-identified]                           ADMIT DATE: 2021  PROVIDER:     Williams Pollack MD      DATE OF PROCEDURE:  2021    PREOPERATIVE DIAGNOSES:  T9 compression fracture; age-related  osteoporosis, pathologic. POSTOPERATIVE DIAGNOSES:  T9 compression fracture; age-related  osteoporosis, pathologic. PROCEDURE PERFORMED:  1. Kyphoplasty, bilateral T9.  2.  T9 bone biopsy percutaneous. 3.  Percutaneous reduction of pathologic compression fracture T9.  4.  Physician-directed and interpreted intraoperative AP and lateral  fluoroscopy. SURGEON:  Williams Pollack MD    ANESTHESIA:  General endotracheal.    COMPLICATIONS:  None apparent. ESTIMATED BLOOD LOSS:  2 mL. INDICATIONS:  The patient is an 78year-old admitted with COPD  exacerbation and back pain. She had a prior kyphoplasty of T7 and did  well from this. She returns with exacerbation _____ and a new  osteoporosis pathologic fracture of T9 identified on MRI. She elected  to undergo kyphoplasty. PROCEDURE:  After obtaining informed consent, she was taken to the  operating suite on 2021. General endotracheal intubation was  initiated. She was positioned prone onto a Markus table with care  taken to pad pressure points. Antibiotics were administered. Level was  localized intraoperatively with AP and lateral fluoroscopy. Puncture  incision entry points were marked under fluoroscopy and she was prepped  and draped in typical surgical fashion. Skin was infiltrated with  Marcaine and epinephrine. Scalpel was used to make a puncture.    Jamshidi needle was advanced bilaterally using AP and lateral

## 2021-04-15 NOTE — PROGRESS NOTES
Occupational Therapy  Chart reviewed. Pt underwent T9 KYPHOPLASTY WITH BONE BIOPSY 4/14. Will need new orders to resume OT services at this time.      Masoud Baldwin, OTR/L

## 2021-04-15 NOTE — PROGRESS NOTES
Pt complaining of indigestion after eating cookies and requesting tums. Secure message sent to Dr Brendan Oscar. See new orders.     Electronically signed by Ashly Castellanos RN on 4/15/2021 at 3:47 AM

## 2021-04-19 ENCOUNTER — APPOINTMENT (OUTPATIENT)
Dept: GENERAL RADIOLOGY | Age: 81
DRG: 177 | End: 2021-04-19
Payer: MEDICARE

## 2021-04-19 ENCOUNTER — HOSPITAL ENCOUNTER (INPATIENT)
Age: 81
LOS: 2 days | Discharge: HOSPICE | DRG: 177 | End: 2021-04-21
Attending: EMERGENCY MEDICINE | Admitting: INTERNAL MEDICINE
Payer: MEDICARE

## 2021-04-19 ENCOUNTER — APPOINTMENT (OUTPATIENT)
Dept: CT IMAGING | Age: 81
DRG: 177 | End: 2021-04-19
Payer: MEDICARE

## 2021-04-19 DIAGNOSIS — R06.02 SHORTNESS OF BREATH: ICD-10-CM

## 2021-04-19 DIAGNOSIS — J18.9 PNEUMONIA OF BOTH LOWER LOBES DUE TO INFECTIOUS ORGANISM: ICD-10-CM

## 2021-04-19 DIAGNOSIS — J90 PLEURAL EFFUSION: ICD-10-CM

## 2021-04-19 DIAGNOSIS — I48.91 ATRIAL FIBRILLATION WITH RAPID VENTRICULAR RESPONSE (HCC): Primary | ICD-10-CM

## 2021-04-19 PROBLEM — R79.89 ELEVATED TROPONIN: Status: ACTIVE | Noted: 2021-04-19

## 2021-04-19 PROBLEM — R79.89 ELEVATED BRAIN NATRIURETIC PEPTIDE (BNP) LEVEL: Status: ACTIVE | Noted: 2021-04-19

## 2021-04-19 PROBLEM — D64.9 ANEMIA: Status: ACTIVE | Noted: 2021-04-19

## 2021-04-19 PROBLEM — R77.8 ELEVATED TROPONIN: Status: ACTIVE | Noted: 2021-04-19

## 2021-04-19 PROBLEM — E43 SEVERE MALNUTRITION (HCC): Chronic | Status: ACTIVE | Noted: 2021-04-19

## 2021-04-19 LAB
A/G RATIO: 1.2 (ref 1.1–2.2)
ALBUMIN SERPL-MCNC: 3.1 G/DL (ref 3.4–5)
ALP BLD-CCNC: 129 U/L (ref 40–129)
ALT SERPL-CCNC: 28 U/L (ref 10–40)
ANION GAP SERPL CALCULATED.3IONS-SCNC: 9 MMOL/L (ref 3–16)
AST SERPL-CCNC: 13 U/L (ref 15–37)
BASOPHILS ABSOLUTE: 0 K/UL (ref 0–0.2)
BASOPHILS RELATIVE PERCENT: 0 %
BILIRUB SERPL-MCNC: <0.2 MG/DL (ref 0–1)
BILIRUBIN URINE: NEGATIVE
BLOOD, URINE: NEGATIVE
BUN BLDV-MCNC: 35 MG/DL (ref 7–20)
CALCIUM SERPL-MCNC: 8.5 MG/DL (ref 8.3–10.6)
CHLORIDE BLD-SCNC: 106 MMOL/L (ref 99–110)
CLARITY: CLEAR
CO2: 33 MMOL/L (ref 21–32)
COLOR: ABNORMAL
CREAT SERPL-MCNC: 0.8 MG/DL (ref 0.6–1.2)
EKG ATRIAL RATE: 468 BPM
EKG DIAGNOSIS: NORMAL
EKG Q-T INTERVAL: 292 MS
EKG QRS DURATION: 82 MS
EKG QTC CALCULATION (BAZETT): 455 MS
EKG R AXIS: 29 DEGREES
EKG T AXIS: -52 DEGREES
EKG VENTRICULAR RATE: 146 BPM
EOSINOPHILS ABSOLUTE: 0 K/UL (ref 0–0.6)
EOSINOPHILS RELATIVE PERCENT: 0 %
EPITHELIAL CELLS, UA: 3 /HPF (ref 0–5)
GFR AFRICAN AMERICAN: >60
GFR NON-AFRICAN AMERICAN: >60
GLOBULIN: 2.6 G/DL
GLUCOSE BLD-MCNC: 136 MG/DL (ref 70–99)
GLUCOSE URINE: NEGATIVE MG/DL
HCT VFR BLD CALC: 30.1 % (ref 36–48)
HEMOGLOBIN: 9.9 G/DL (ref 12–16)
HYALINE CASTS: 1 /LPF (ref 0–8)
KETONES, URINE: ABNORMAL MG/DL
LACTIC ACID: 1.1 MMOL/L (ref 0.4–2)
LEUKOCYTE ESTERASE, URINE: NEGATIVE
LV EF: 58 %
LVEF MODALITY: NORMAL
LYMPHOCYTES ABSOLUTE: 0.2 K/UL (ref 1–5.1)
LYMPHOCYTES RELATIVE PERCENT: 1.2 %
MCH RBC QN AUTO: 30.8 PG (ref 26–34)
MCHC RBC AUTO-ENTMCNC: 33 G/DL (ref 31–36)
MCV RBC AUTO: 93.4 FL (ref 80–100)
MICROSCOPIC EXAMINATION: YES
MONOCYTES ABSOLUTE: 0.8 K/UL (ref 0–1.3)
MONOCYTES RELATIVE PERCENT: 5.9 %
NEUTROPHILS ABSOLUTE: 13.3 K/UL (ref 1.7–7.7)
NEUTROPHILS RELATIVE PERCENT: 92.9 %
NITRITE, URINE: NEGATIVE
PDW BLD-RTO: 15.7 % (ref 12.4–15.4)
PH UA: 5.5 (ref 5–8)
PLATELET # BLD: 198 K/UL (ref 135–450)
PMV BLD AUTO: 8.2 FL (ref 5–10.5)
POTASSIUM SERPL-SCNC: 4.7 MMOL/L (ref 3.5–5.1)
PRO-BNP: 9563 PG/ML (ref 0–449)
PROCALCITONIN: 0.13 NG/ML (ref 0–0.15)
PROTEIN UA: ABNORMAL MG/DL
RBC # BLD: 3.22 M/UL (ref 4–5.2)
RBC UA: 2 /HPF (ref 0–4)
SARS-COV-2, NAAT: NOT DETECTED
SODIUM BLD-SCNC: 148 MMOL/L (ref 136–145)
SPECIFIC GRAVITY UA: 1.02 (ref 1–1.03)
TOTAL PROTEIN: 5.7 G/DL (ref 6.4–8.2)
TROPONIN: 0.02 NG/ML
TROPONIN: <0.01 NG/ML
TSH SERPL DL<=0.05 MIU/L-ACNC: 1.48 UIU/ML (ref 0.27–4.2)
URINE REFLEX TO CULTURE: ABNORMAL
URINE TYPE: ABNORMAL
UROBILINOGEN, URINE: 0.2 E.U./DL
WBC # BLD: 14.3 K/UL (ref 4–11)
WBC UA: 1 /HPF (ref 0–5)

## 2021-04-19 PROCEDURE — 6370000000 HC RX 637 (ALT 250 FOR IP): Performed by: INTERNAL MEDICINE

## 2021-04-19 PROCEDURE — P9612 CATHETERIZE FOR URINE SPEC: HCPCS

## 2021-04-19 PROCEDURE — 2700000000 HC OXYGEN THERAPY PER DAY

## 2021-04-19 PROCEDURE — 93970 EXTREMITY STUDY: CPT

## 2021-04-19 PROCEDURE — 99223 1ST HOSP IP/OBS HIGH 75: CPT | Performed by: INTERNAL MEDICINE

## 2021-04-19 PROCEDURE — 2500000003 HC RX 250 WO HCPCS: Performed by: INTERNAL MEDICINE

## 2021-04-19 PROCEDURE — 71260 CT THORAX DX C+: CPT

## 2021-04-19 PROCEDURE — 2580000003 HC RX 258: Performed by: INTERNAL MEDICINE

## 2021-04-19 PROCEDURE — 99285 EMERGENCY DEPT VISIT HI MDM: CPT

## 2021-04-19 PROCEDURE — 80053 COMPREHEN METABOLIC PANEL: CPT

## 2021-04-19 PROCEDURE — 2060000000 HC ICU INTERMEDIATE R&B

## 2021-04-19 PROCEDURE — 6360000004 HC RX CONTRAST MEDICATION: Performed by: PHYSICIAN ASSISTANT

## 2021-04-19 PROCEDURE — 93005 ELECTROCARDIOGRAM TRACING: CPT | Performed by: PHYSICIAN ASSISTANT

## 2021-04-19 PROCEDURE — 84443 ASSAY THYROID STIM HORMONE: CPT

## 2021-04-19 PROCEDURE — 6360000002 HC RX W HCPCS: Performed by: INTERNAL MEDICINE

## 2021-04-19 PROCEDURE — 94760 N-INVAS EAR/PLS OXIMETRY 1: CPT

## 2021-04-19 PROCEDURE — 2500000003 HC RX 250 WO HCPCS: Performed by: PHYSICIAN ASSISTANT

## 2021-04-19 PROCEDURE — 85025 COMPLETE CBC W/AUTO DIFF WBC: CPT

## 2021-04-19 PROCEDURE — 93010 ELECTROCARDIOGRAM REPORT: CPT | Performed by: INTERNAL MEDICINE

## 2021-04-19 PROCEDURE — 96375 TX/PRO/DX INJ NEW DRUG ADDON: CPT

## 2021-04-19 PROCEDURE — 96361 HYDRATE IV INFUSION ADD-ON: CPT

## 2021-04-19 PROCEDURE — 6360000002 HC RX W HCPCS: Performed by: PHYSICIAN ASSISTANT

## 2021-04-19 PROCEDURE — 94640 AIRWAY INHALATION TREATMENT: CPT

## 2021-04-19 PROCEDURE — 87040 BLOOD CULTURE FOR BACTERIA: CPT

## 2021-04-19 PROCEDURE — 87635 SARS-COV-2 COVID-19 AMP PRB: CPT

## 2021-04-19 PROCEDURE — 93306 TTE W/DOPPLER COMPLETE: CPT

## 2021-04-19 PROCEDURE — 81001 URINALYSIS AUTO W/SCOPE: CPT

## 2021-04-19 PROCEDURE — 83605 ASSAY OF LACTIC ACID: CPT

## 2021-04-19 PROCEDURE — 36415 COLL VENOUS BLD VENIPUNCTURE: CPT

## 2021-04-19 PROCEDURE — 96365 THER/PROPH/DIAG IV INF INIT: CPT

## 2021-04-19 PROCEDURE — 2580000003 HC RX 258: Performed by: PHYSICIAN ASSISTANT

## 2021-04-19 PROCEDURE — 84484 ASSAY OF TROPONIN QUANT: CPT

## 2021-04-19 PROCEDURE — 84145 PROCALCITONIN (PCT): CPT

## 2021-04-19 PROCEDURE — 71045 X-RAY EXAM CHEST 1 VIEW: CPT

## 2021-04-19 PROCEDURE — 6370000000 HC RX 637 (ALT 250 FOR IP): Performed by: EMERGENCY MEDICINE

## 2021-04-19 PROCEDURE — 83880 ASSAY OF NATRIURETIC PEPTIDE: CPT

## 2021-04-19 PROCEDURE — 6370000000 HC RX 637 (ALT 250 FOR IP): Performed by: PHYSICIAN ASSISTANT

## 2021-04-19 RX ORDER — IPRATROPIUM BROMIDE AND ALBUTEROL SULFATE 2.5; .5 MG/3ML; MG/3ML
1 SOLUTION RESPIRATORY (INHALATION) ONCE
Status: COMPLETED | OUTPATIENT
Start: 2021-04-19 | End: 2021-04-19

## 2021-04-19 RX ORDER — BUDESONIDE AND FORMOTEROL FUMARATE DIHYDRATE 160; 4.5 UG/1; UG/1
2 AEROSOL RESPIRATORY (INHALATION) 2 TIMES DAILY
Status: DISCONTINUED | OUTPATIENT
Start: 2021-04-19 | End: 2021-04-20

## 2021-04-19 RX ORDER — DILTIAZEM HYDROCHLORIDE 5 MG/ML
10 INJECTION INTRAVENOUS ONCE
Status: COMPLETED | OUTPATIENT
Start: 2021-04-19 | End: 2021-04-19

## 2021-04-19 RX ORDER — MORPHINE SULFATE 2 MG/ML
2 INJECTION, SOLUTION INTRAMUSCULAR; INTRAVENOUS
Status: DISCONTINUED | OUTPATIENT
Start: 2021-04-19 | End: 2021-04-21

## 2021-04-19 RX ORDER — ATORVASTATIN CALCIUM 10 MG/1
10 TABLET, FILM COATED ORAL DAILY
Status: DISCONTINUED | OUTPATIENT
Start: 2021-04-19 | End: 2021-04-21

## 2021-04-19 RX ORDER — FUROSEMIDE 10 MG/ML
40 INJECTION INTRAMUSCULAR; INTRAVENOUS 2 TIMES DAILY
Status: DISCONTINUED | OUTPATIENT
Start: 2021-04-19 | End: 2021-04-20

## 2021-04-19 RX ORDER — SODIUM CHLORIDE 0.9 % (FLUSH) 0.9 %
5-40 SYRINGE (ML) INJECTION PRN
Status: DISCONTINUED | OUTPATIENT
Start: 2021-04-19 | End: 2021-04-21 | Stop reason: HOSPADM

## 2021-04-19 RX ORDER — PROMETHAZINE HYDROCHLORIDE 25 MG/1
12.5 TABLET ORAL EVERY 6 HOURS PRN
Status: DISCONTINUED | OUTPATIENT
Start: 2021-04-19 | End: 2021-04-21 | Stop reason: HOSPADM

## 2021-04-19 RX ORDER — ACETAMINOPHEN 650 MG/1
650 SUPPOSITORY RECTAL EVERY 6 HOURS PRN
Status: DISCONTINUED | OUTPATIENT
Start: 2021-04-19 | End: 2021-04-21 | Stop reason: HOSPADM

## 2021-04-19 RX ORDER — SODIUM CHLORIDE 0.9 % (FLUSH) 0.9 %
5-40 SYRINGE (ML) INJECTION EVERY 12 HOURS SCHEDULED
Status: DISCONTINUED | OUTPATIENT
Start: 2021-04-19 | End: 2021-04-21 | Stop reason: HOSPADM

## 2021-04-19 RX ORDER — ACETAMINOPHEN 325 MG/1
650 TABLET ORAL EVERY 6 HOURS PRN
Status: DISCONTINUED | OUTPATIENT
Start: 2021-04-19 | End: 2021-04-21 | Stop reason: HOSPADM

## 2021-04-19 RX ORDER — METOPROLOL SUCCINATE 50 MG/1
50 TABLET, EXTENDED RELEASE ORAL DAILY
Status: DISCONTINUED | OUTPATIENT
Start: 2021-04-19 | End: 2021-04-21

## 2021-04-19 RX ORDER — BUPROPION HYDROCHLORIDE 150 MG/1
150 TABLET, EXTENDED RELEASE ORAL DAILY
Status: DISCONTINUED | OUTPATIENT
Start: 2021-04-19 | End: 2021-04-21 | Stop reason: HOSPADM

## 2021-04-19 RX ORDER — FUROSEMIDE 10 MG/ML
40 INJECTION INTRAMUSCULAR; INTRAVENOUS ONCE
Status: COMPLETED | OUTPATIENT
Start: 2021-04-19 | End: 2021-04-19

## 2021-04-19 RX ORDER — ONDANSETRON 2 MG/ML
4 INJECTION INTRAMUSCULAR; INTRAVENOUS EVERY 6 HOURS PRN
Status: DISCONTINUED | OUTPATIENT
Start: 2021-04-19 | End: 2021-04-21 | Stop reason: HOSPADM

## 2021-04-19 RX ORDER — SODIUM CHLORIDE 9 MG/ML
25 INJECTION, SOLUTION INTRAVENOUS PRN
Status: DISCONTINUED | OUTPATIENT
Start: 2021-04-19 | End: 2021-04-21 | Stop reason: HOSPADM

## 2021-04-19 RX ORDER — METOPROLOL TARTRATE 5 MG/5ML
5 INJECTION INTRAVENOUS ONCE
Status: COMPLETED | OUTPATIENT
Start: 2021-04-19 | End: 2021-04-19

## 2021-04-19 RX ORDER — METHYLPREDNISOLONE SODIUM SUCCINATE 125 MG/2ML
125 INJECTION, POWDER, LYOPHILIZED, FOR SOLUTION INTRAMUSCULAR; INTRAVENOUS ONCE
Status: COMPLETED | OUTPATIENT
Start: 2021-04-19 | End: 2021-04-19

## 2021-04-19 RX ORDER — 0.9 % SODIUM CHLORIDE 0.9 %
500 INTRAVENOUS SOLUTION INTRAVENOUS ONCE
Status: COMPLETED | OUTPATIENT
Start: 2021-04-19 | End: 2021-04-19

## 2021-04-19 RX ORDER — BUPROPION HYDROCHLORIDE 150 MG/1
150 TABLET ORAL DAILY
COMMUNITY

## 2021-04-19 RX ORDER — ASPIRIN 81 MG/1
81 TABLET ORAL DAILY
Status: DISCONTINUED | OUTPATIENT
Start: 2021-04-19 | End: 2021-04-19

## 2021-04-19 RX ORDER — DENOSUMAB 60 MG/ML
60 INJECTION SUBCUTANEOUS
Status: ON HOLD | COMMUNITY
End: 2021-04-21 | Stop reason: HOSPADM

## 2021-04-19 RX ORDER — PREDNISONE 10 MG/1
10 TABLET ORAL 2 TIMES DAILY
COMMUNITY

## 2021-04-19 RX ADMIN — SODIUM CHLORIDE 1500 MG: 900 INJECTION INTRAVENOUS at 20:44

## 2021-04-19 RX ADMIN — METOPROLOL SUCCINATE 50 MG: 50 TABLET, EXTENDED RELEASE ORAL at 07:59

## 2021-04-19 RX ADMIN — BUDESONIDE AND FORMOTEROL FUMARATE DIHYDRATE 2 PUFF: 160; 4.5 AEROSOL RESPIRATORY (INHALATION) at 19:46

## 2021-04-19 RX ADMIN — IPRATROPIUM BROMIDE AND ALBUTEROL SULFATE 1 AMPULE: .5; 3 SOLUTION RESPIRATORY (INHALATION) at 01:37

## 2021-04-19 RX ADMIN — FUROSEMIDE 40 MG: 10 INJECTION, SOLUTION INTRAMUSCULAR; INTRAVENOUS at 03:25

## 2021-04-19 RX ADMIN — Medication 10 ML: at 08:01

## 2021-04-19 RX ADMIN — AZITHROMYCIN MONOHYDRATE 500 MG: 500 INJECTION, POWDER, LYOPHILIZED, FOR SOLUTION INTRAVENOUS at 07:59

## 2021-04-19 RX ADMIN — SODIUM CHLORIDE 1500 MG: 900 INJECTION INTRAVENOUS at 10:30

## 2021-04-19 RX ADMIN — IOPAMIDOL 75 ML: 755 INJECTION, SOLUTION INTRAVENOUS at 02:05

## 2021-04-19 RX ADMIN — BUPROPION HYDROCHLORIDE 150 MG: 150 TABLET, EXTENDED RELEASE ORAL at 07:58

## 2021-04-19 RX ADMIN — APIXABAN 5 MG: 5 TABLET, FILM COATED ORAL at 22:13

## 2021-04-19 RX ADMIN — CEFTRIAXONE 1000 MG: 1 INJECTION, POWDER, FOR SOLUTION INTRAMUSCULAR; INTRAVENOUS at 08:00

## 2021-04-19 RX ADMIN — DILTIAZEM HYDROCHLORIDE 10 MG: 5 INJECTION INTRAVENOUS at 06:58

## 2021-04-19 RX ADMIN — SODIUM CHLORIDE 1500 MG: 900 INJECTION INTRAVENOUS at 03:45

## 2021-04-19 RX ADMIN — BUDESONIDE AND FORMOTEROL FUMARATE DIHYDRATE 2 PUFF: 160; 4.5 AEROSOL RESPIRATORY (INHALATION) at 08:36

## 2021-04-19 RX ADMIN — ASPIRIN 81 MG: 81 TABLET, COATED ORAL at 07:59

## 2021-04-19 RX ADMIN — FUROSEMIDE 40 MG: 10 INJECTION, SOLUTION INTRAMUSCULAR; INTRAVENOUS at 08:00

## 2021-04-19 RX ADMIN — SODIUM CHLORIDE 1500 MG: 900 INJECTION INTRAVENOUS at 15:35

## 2021-04-19 RX ADMIN — METOPROLOL TARTRATE 5 MG: 1 INJECTION, SOLUTION INTRAVENOUS at 01:30

## 2021-04-19 RX ADMIN — METOPROLOL TARTRATE 25 MG: 25 TABLET, FILM COATED ORAL at 04:46

## 2021-04-19 RX ADMIN — SODIUM CHLORIDE 500 ML: 9 INJECTION, SOLUTION INTRAVENOUS at 01:30

## 2021-04-19 RX ADMIN — FUROSEMIDE 40 MG: 10 INJECTION, SOLUTION INTRAMUSCULAR; INTRAVENOUS at 18:10

## 2021-04-19 RX ADMIN — ENOXAPARIN SODIUM 40 MG: 40 INJECTION SUBCUTANEOUS at 07:59

## 2021-04-19 RX ADMIN — METHYLPREDNISOLONE SODIUM SUCCINATE 125 MG: 125 INJECTION, POWDER, FOR SOLUTION INTRAMUSCULAR; INTRAVENOUS at 01:30

## 2021-04-19 RX ADMIN — ATORVASTATIN CALCIUM 10 MG: 10 TABLET, FILM COATED ORAL at 13:45

## 2021-04-19 RX ADMIN — DILTIAZEM HYDROCHLORIDE 5 MG/HR: 5 INJECTION, SOLUTION INTRAVENOUS at 07:08

## 2021-04-19 RX ADMIN — Medication 10 ML: at 20:44

## 2021-04-19 RX ADMIN — MORPHINE SULFATE 2 MG: 2 INJECTION, SOLUTION INTRAMUSCULAR; INTRAVENOUS at 06:58

## 2021-04-19 ASSESSMENT — PAIN SCALES - GENERAL
PAINLEVEL_OUTOF10: 0
PAINLEVEL_OUTOF10: 0
PAINLEVEL_OUTOF10: 4
PAINLEVEL_OUTOF10: 0

## 2021-04-19 ASSESSMENT — ENCOUNTER SYMPTOMS
COLOR CHANGE: 0
ABDOMINAL PAIN: 0
EYE DISCHARGE: 0
VOMITING: 0
COUGH: 1
EYE ITCHING: 0
CONSTIPATION: 0
SHORTNESS OF BREATH: 1

## 2021-04-19 NOTE — PROGRESS NOTES
Medication Reconciliation    List of medications patient is currently taking is complete. Source of information: 1. List from [facility]                                      2. EPIC records      Allergies  Adhesive tape, Codeine, Iodine, and Lisinopril     Notes regarding home medications:   1. Patient received all of her home medications prior to arrival to the emergency department. 2. Wellbutrin changed to Wellbutrin  mg daily. 3. On Augmentin until 4/20/21.    4. Prednisone added to list.       Angelina Camacho, PharmD, 4/19/2021 1:06 PM

## 2021-04-19 NOTE — PLAN OF CARE
Problem: Falls - Risk of:  Goal: Will remain free from falls  Description: Will remain free from falls  Outcome: Ongoing     Problem: Falls - Risk of:  Goal: Absence of physical injury  Description: Absence of physical injury  Outcome: Ongoing     Problem: Skin Integrity:  Goal: Absence of new skin breakdown  Description: Absence of new skin breakdown  Outcome: Ongoing

## 2021-04-19 NOTE — ED NOTES
Straight cath performed per sterile protocol. Urine specimen obtained and sent to lab. Pt tolerated well. Call light within reach. Will continue to monitor patient.         Rajeev Meraz RN  04/19/21 3910

## 2021-04-19 NOTE — PROGRESS NOTES
Physician Progress Note      Conchis Allen  CSN #:                  [id number]  :                       1940  ADMIT DATE:       2021 12:36 AM  DISCH DATE:  RESPONDING  PROVIDER #:        Lory Edward MD          QUERY TEXT:    Pt admitted with Pneumonia and acute on chronic HF. Noted documentation of   Severe malnutrition on 21 by ordered Dietary)consultant. If possible,   please document in progress notes and discharge summary:      The medical record reflects the following:  Risk Factors: Acute and Chronic illness  Clinical Indicators:  Dietician consult \"Malnutrition Status:  Severe   malnutrition  Context:  Acute Illness  Findings of the 6 clinical characteristics of malnutrition:  Energy Intake:  Mild decrease in energy intake  Weight Loss:  No significant weight loss  Body Fat Loss:  7 - Moderate body fat loss Orbital  Muscle Mass Loss:  7 - Moderate muscle mass loss Clavicles (pectoralis &   deltoids), Temples (temporalis)  Fluid Accumulation:  1 - Mild Extremities   Strength:  Not Performed\"  Treatment: Dietitian consult and dietary supplements BID  Options provided:  -- Severe malnutrition confirmed present on admission  -- Severe malnutrition ruled out  -- Other - I will add my own diagnosis  -- Disagree - Not applicable / Not valid  -- Disagree - Clinically unable to determine / Unknown  -- Refer to Clinical Documentation Reviewer    PROVIDER RESPONSE TEXT:    The diagnosis of Severe malnutrition was confirmed as present on admission. Query created by: Daija Ribera on 2021 11:36 AM      QUERY TEXT:    Pt admitted with PNA and Chronic on acute HF. Pt noted to have home oxygen   therapy. If possible, please document in the progress notes and discharge   summary if you are evaluating and/or treating any of the following:     The medical record reflects the following:  Risk Factors: COPD PNA CHF  Clinical Indicators: per ED \"COPD WEARS 4l NASAL CANNULA\",93-99% on 4L NC RR >   22  Treatment: Oxygen therapy, IV Lasix Symbicort BID and IV antibiotics  Options provided:  -- Chronic respiratory failure with hypoxia  -- Chronic respiratory failure with hypercapnia  -- Acute on chronic respiratory failure with hypoxia  -- Acute on chronic respiratory failure with hypercapnia  -- Other - I will add my own diagnosis  -- Disagree - Not applicable / Not valid  -- Disagree - Clinically unable to determine / Unknown  -- Refer to Clinical Documentation Reviewer    PROVIDER RESPONSE TEXT:    This patient has chronic respiratory failure with hypoxia.     Query created by: Dayan Dutton on 4/19/2021 11:39 AM      Electronically signed by:  Shanice Moya MD 4/19/2021 12:58 PM

## 2021-04-19 NOTE — CONSULTS
Comprehensive Nutrition Assessment    Type and Reason for Visit:  Consult, Positive Nutrition Screen(Demarcus Score/ MST3)    Nutrition Recommendations/Plan:   Continue Cardiac, Low Na diet with 1500 mL FR. Start Ensure Clear QD. Trial Chris QD. Pt meets criteria for Severe Malnutrition given poor PO intakes PTA and moderate fat and muscle loss noted. Nutrition Assessment:  +Nutrition screen and consult for Demarcus score. Pt presented with SOB from NH and PM of COPD, HLD, and HTN. Pt reports appetite has been poor for \"a long time\" r/t stomach problems, unable to provide clear timeline or symptoms. Pt reports large amount of wt loss but again unable to provide detailed information regarding amount or time frame. Per EMR wt usually 135# and BID=789#. Pt with visible muscle and fat wasting. Pt with stg 2 PU to buttock. Discussed with pt need for higher protein to aid in wound healing. Pt agreeable to ONS and prefers the clear ONS. Malnutrition Assessment:  Malnutrition Status:  Severe malnutrition    Context:  Acute Illness     Findings of the 6 clinical characteristics of malnutrition:  Energy Intake:  Mild decrease in energy intake   Weight Loss:  No significant weight loss     Body Fat Loss:  7 - Moderate body fat loss Orbital   Muscle Mass Loss:  7 - Moderate muscle mass loss Clavicles (pectoralis & deltoids), Temples (temporalis)  Fluid Accumulation:  1 - Mild Extremities   Strength:  Not Performed    Estimated Daily Nutrient Needs:  Energy (kcal):  3891-0564 kcal (25-30 kcal/kg CBW)  Protein (g):   gm (1.4-1.8gm/kg CBW)  Fluid (ml/day):  per provider, currently 1500 mL FR    Nutrition Related Findings:  +1 BLE edema; Na 148, Glu 136      Wounds:  Pressure Injury, Stage II(buttock)       Current Nutrition Therapies:    DIET CARDIAC; Low Sodium (2 GM);  Daily Fluid Restriction: 1500 ml    Anthropometric Measures:  · Height: 5' 7\" (170.2 cm)  · Current Body Weight: 138 lb (62.6 kg) · Admission Body Weight: 141 lb (64 kg)    · Usual Body Weight: 135 lb (61.2 kg)     · Ideal Body Weight: 135 lbs; % Ideal Body Weight 102.2 %   · BMI: 21.6  · BMI Categories: Underweight (BMI less than 22) age over 72       Nutrition Diagnosis:   · Severe malnutrition related to inadequate protein-energy intake as evidenced by moderate muscle loss, moderate loss of subcutaneous fat, poor intake prior to admission    · Increased nutrient needs related to increase demand for energy/nutrients as evidenced by wounds      Nutrition Interventions:   Food and/or Nutrient Delivery:  Continue Current Diet, Start Oral Nutrition Supplement  Nutrition Education/Counseling:  No recommendation at this time   Coordination of Nutrition Care:  Continue to monitor while inpatient    Goals:  consume <50% of meal and ONS. Nutrition Monitoring and Evaluation:   Food/Nutrient Intake Outcomes:  Food and Nutrient Intake, Supplement Intake  Physical Signs/Symptoms Outcomes:  Biochemical Data, Weight, Skin, GI Status, Fluid Status or Edema, Meal Time Behavior     Discharge Planning:     Too soon to determine     Electronically signed by Scarlet Stein RD, WILLIAM on 4/19/21 at 11:09 AM EDT    Contact: [phone]

## 2021-04-19 NOTE — CONSULTS
2021    T9 KYPHOPLASTY WITH BONE BIOPSY performed by Clementina Patel MD at [facility] History   Problem Relation Age of Onset    Diabetes Mother     Cancer Sister     Coronary Art Dis Sister     Coronary Art Dis Brother     Stroke Brother      Social History     Socioeconomic History    Marital status:      Spouse name: None    Number of children: None    Years of education: None    Highest education level: None   Occupational History    None   Social Needs    Financial resource strain: None    Food insecurity     Worry: None     Inability: None    Transportation needs     Medical: None     Non-medical: None   Tobacco Use    Smoking status: Former Smoker     Packs/day: 0.50     Years: 65.00     Pack years: 32.50     Types: Cigarettes     Quit date: 10/6/2020     Years since quittin.5    Smokeless tobacco: Never Used    Tobacco comment: Advised to quit repeatedly, not interested   Substance and Sexual Activity    Alcohol use: Yes     Comment: rare    Drug use: No    Sexual activity: Never   Lifestyle    Physical activity     Days per week: None     Minutes per session: None    Stress: None   Relationships    Social connections     Talks on phone: None     Gets together: None     Attends [employer] service: None     Active member of club or organization: None     Attends meetings of clubs or organizations: None     Relationship status: None    Intimate partner violence     Fear of current or ex partner: None     Emotionally abused: None     Physically abused: None     Forced sexual activity: None   Other Topics Concern    None   Social History Narrative    Self-breast exams: yes    Exercise: housework 3-4x/wk    Seatbelt use: Always    Living will: no,   additional information provided    Sexual activity: none         MEDICATIONS   SCHEDULED:  aspirin, 81 mg, Daily  buPROPion, 150 mg, Daily  budesonide-formoterol, 2 puff, BID  metoprolol succinate, 50 mg, Daily  sodium chloride flush, 5-40 mL, 2 times per day  furosemide, 40 mg, BID  azithromycin, 500 mg, Q24H  apixaban, 5 mg, BID  ampicillin-sulbactam, 1,500 mg, Q6H      FLUIDS/DRIPS:     sodium chloride      dilTIAZem Stopped (04/19/21 0716)     PRNs: sodium chloride flush, 5-40 mL, PRN  sodium chloride, 25 mL, PRN  promethazine, 12.5 mg, Q6H PRN    Or  ondansetron, 4 mg, Q6H PRN  morphine, 2 mg, Q3H PRN  acetaminophen, 650 mg, Q6H PRN    Or  acetaminophen, 650 mg, Q6H PRN      ALLERGIES:  She   Allergies   Allergen Reactions    Adhesive Tape Other (See Comments)     Can cause irritation when on awhile    Codeine      GI upset    Iodine Hives    Lisinopril      Angioedema         REVIEW OF SYSTEMS   Pertinent ROS noted in HPI    PHYSICAL EXAM     Vitals:    04/19/21 0718 04/19/21 0758 04/19/21 0837 04/19/21 1027   BP: 104/69 115/71     Pulse: 110 86     Resp:  18     Temp:  97.8 °F (36.6 °C)     TempSrc:  Oral     SpO2:  94% 94%    Weight:       Height:    5' 7\" (1.702 m)       I/O last 3 completed shifts: In: 550 [IV Piggyback:550]  Out: -       Physical Exam:  General appearance: alert, cooperative, no distress, appears stated age  Eyes: Anicteric  Head: Normocephalic, without obvious abnormality  Lungs: Diminished breath sounds bilaterally, Normal Effort  Heart: irregularly irregular. Abdomen: soft, non-distended, non-tender. Bowel sounds normal. No masses,  no organomegaly. Extremities: atraumatic, no cyanosis or edema  Skin: warm and dry, no jaundice  Neuro: Grossly intact, A&OX3      LABS AND IMAGING   Laboratory   Recent Labs     04/19/21 0100   WBC 14.3*   HGB 9.9*   HCT 30.1*   MCV 93.4        Recent Labs     04/19/21 0100   *   K 4.7      CO2 33*   BUN 35*   CREATININE 0.8     Recent Labs     04/19/21 0100   AST 13*   ALT 28   BILITOT <0.2   ALKPHOS 129     No results for input(s): LIPASE, AMYLASE in the last 72 hours.   No results for input(s): PROTIME, INR in the last 72 hours.    Imaging  CT CHEST PULMONARY EMBOLISM W CONTRAST   Preliminary Result   1. No pulmonary embolus. 2. Large bilateral effusions with opacities in the mid to lower lung zone   which may represent atelectasis or infection. 3. Significant airway attenuation and secretions as described. 4. Moderate hiatal hernia. 5. Severe atherosclerosis. XR CHEST PORTABLE   Final Result   Stable cardiomegaly with slowly resolving pulmonary edema      Hazy perihilar and bibasilar opacities which is slightly less prominent. Small bibasilar pleural effusions which are more apparent         VL Extremity Venous Bilateral    (Results Pending)         ASSESSMENT AND RECOMMENDATIONS   78 y.o. female with a PMH of asthma, hypertension, tobacco abuse, COPD, CKD, hyperlipidemia, OA, irritable bowel syndrome, stress incontinence, osteoporosis who presented 4/19/2021 with SOB. We have been consulted regarding dysphagia. IMPRESSION:    1. Dysphagia  -Esophagram showed a small HH with a possible Schatzki's ring. MBS was normal.   Today pt states she feels solids get stuck, burps often and has heartburn. She denies odynophagia, globus sensation, n/v, fevers or chills. -Due to start Eliquis today. She is currently on 3L of O2 and was not on oxygen at her SNF until her last admission for a COPD exacerbation. 2. Afib with RVR, due to start Eliquis today. Cardiology following  3. Possible PNA, on Unasyn and Azithromycin  4. Bilateral pleural effusion  5. COPD    RECOMMENDATIONS:    -Please await further recommendations from Dr. Sisi Samuel. EGD inpatient vs outpatient being considered at this time. If you have any questions or need any further information, please feel free to contact our consult team.  Thank you for allowing us to participate in the care of [address] [place] [address]. The note was completed using Dragon voice recognition transcription.  Every effort was made to ensure accuracy; however, inadvertent transcription errors may be present despite my best efforts to edit errors. Roetta Kayser PA-C    Attending physician addendum:    I have personally seen and examined the patient, reviewed the patient's medical record and pertinent labs and clinical imaging. I have personally staffed the case with Cherry MOTA. I agree with her consultation note, exam findings, assessment and plans  as written above. I have made appropriate modifications and edited her assessment and plan where needed to reflect my impression and plans for this patient. Allegra Norris is a 79 YO  female with a PMH of asthma, hypertension, tobacco abuse, COPD, CKD, hyperlipidemia, OA, irritable bowel syndrome, stress incontinence, osteoporosis who presented 4/19/2021 with SOB. We have been consulted regarding dysphagia. Patient being treated for Afib with RVR and COPD exacerbation. She reports breathing has improved but not ambulating. She remains on 3L O2. Esophagram showed a Schatzki's Ring which is the likely cause of intermittent dysphagia. Patient is still labored in breathing and would hold off on an EGD at this point until pulmonary status improves. We can plan to perform as an outpatient. Thank you for allowing me to participate in this patient's care. If there are any questions or concerns regarding this patient, or the plan we have set in place, please feel free to contact me at [phone].      Hui Angeles MD

## 2021-04-19 NOTE — ED NOTES
Care assumed from Patrick Ramires  92..  Labs and EKG completed prior to this RN      Don Rao, GRANT  04/19/21 8899

## 2021-04-19 NOTE — CONSULTS
Clinical Pharmacy Note  Vancomycin Consult    Pharmacy consult received for one-time dose of vancomycin in the Emergency Department per NAKUL Licea. Ht Readings from Last 1 Encounters:   04/13/21 5' 7\" (1.702 m)        Wt Readings from Last 1 Encounters:   04/19/21 141 lb 1.5 oz (64 kg)         Assessment/Plan:   Vancomycin 1250 mg x 1 in ED.  If Vancomycin is to continue on admission and pharmacy is to manage dosing, please re-consult with admission orders.       Lisa Hilton, PharmD  4/19/2021 2:46 AM

## 2021-04-19 NOTE — ED PROVIDER NOTES
[address] [facility]      Pt Name: Levi Osei  MRN: [id number]  [place] 1940  Date of evaluation: 4/19/2021  Provider: NAKUL Muhammad    This patient was seen and evaluated by the attending physician Tatiana Liriano MD.    [address] [place] [address]       Chief Complaint   Patient presents with    Shortness of Breath     sob X WEEKS , HX OF COPD WEARS 4l NASAL CANNULA       CRITICAL CARE TIME   I performed a total Critical Care time of 15 minutes, excluding separately reportable procedures. There was a high probability of clinically significant/life threatening deterioration in the patient's condition which required my urgent intervention. Not limited to multiple reexaminations, discussions with attending physician and consultants. HISTORY OF PRESENT ILLNESS  (Location/Symptom, Timing/Onset, Context/Setting, Quality, Duration, Modifying Factors, Severity.)   Rufina Montenegro is a 78 y.o. female who presents to the emergency department via EMS from the [facility] facility. She complains of shortness of breath that has been going on for several weeks but got worse tonight. She denies chest pain. She is a former smoker states that she quit smoking couple months ago. History of COPD, hypertension hyperlipidemia. She is not aware of any personal history of A. fib. Not currently anticoagulated. She tells me that she uses 2 L of oxygen via nasal cannula at the nursing home. She was started on Augmentin a couple of days ago for \"a respiratory infection. \"  She had T9 kyphoplasty 6 days ago. Nursing Notes were reviewed and I agree. REVIEW OF SYSTEMS    (2-9 systems for level 4, 10 or more for level 5)     Review of Systems   Constitutional: Negative for fever. Respiratory: Positive for cough and shortness of breath. Cardiovascular: Negative for chest pain and leg swelling.    Gastrointestinal: Negative for abdominal pain and vomiting. Skin: Negative for color change, rash and wound. Neurological: Negative for weakness. Psychiatric/Behavioral: Negative for agitation and behavioral problems. Except as noted above the remainder of the review of systems was reviewed and negative. PAST MEDICAL HISTORY         Diagnosis Date    HTN (hypertension)     Hypercholesterolemia     MAC (mycobacterium avium-intracellulare complex)     Osteoarthritis     Osteopetrosis     Papanicolaou smear of cervix with atypical squamous cells of undetermined significance (ASC-US) 1998    Shingles     Stress incontinence        SURGICAL HISTORY           Procedure Laterality Date    BREAST SURGERY      CATARACT REMOVAL Left 2009    CHOLECYSTECTOMY      CT BONE MARROW BIOPSY  2/12/2021    CT BONE MARROW BIOPSY 2/12/2021 [facility] CT    INGUINAL HERNIA REPAIR Right 2015    SPINE SURGERY N/A 11/25/2020    T7 KYPHOPLASTY WITH BONE BIOPSY performed by Navya Chavez MD at [place] N/A 4/14/2021    T9 KYPHOPLASTY WITH BONE BIOPSY performed by Navya Chavez MD at [address]       Previous Medications    ACETAMINOPHEN (TYLENOL) 500 MG TABLET    Take 500 mg by mouth 3 times daily as needed. ALBUTEROL SULFATE  (90 BASE) MCG/ACT INHALER    INHALE 2 PUFF(S) BY MOUTH EVERY 4 HOURS AS NEEDED FOR WHEEZING    AMOXICILLIN-CLAVULANATE (AUGMENTIN) 875-125 MG PER TABLET    Take 1 tablet by mouth 2 times daily for 5 days    ASPIRIN 81 MG EC TABLET    Take 81 mg by mouth daily.       BUPROPION (WELLBUTRIN SR) 150 MG EXTENDED RELEASE TABLET    TAKE ONE TABLET BY MOUTH DAILY    DENOSUMAB (PROLIA) 60 MG/ML SOSY SC INJECTION    Inject 1 mL into the skin once for 1 dose    DILTIAZEM (TIAZAC) 120 MG EXTENDED RELEASE CAPSULE    TAKE ONE CAPSULE BY MOUTH once daily in the evening    DOCUSATE SODIUM (STOOL SOFTENER) 250 MG CAPSULE    Take 250 mg by mouth 2 times daily as needed for Constipation 134 21 99 %  141 lb 1.5 oz (64 kg)       Physical Exam  Vitals signs and nursing note reviewed. Constitutional:       General: She is not in acute distress. Appearance: She is well-developed. HENT:      Head: Normocephalic and atraumatic. Neck:      Musculoskeletal: Normal range of motion. Cardiovascular:      Rate and Rhythm: Tachycardia present. Rhythm irregular. Pulmonary:      Effort: Tachypnea and accessory muscle usage present. Breath sounds: Wheezing present. No decreased breath sounds. Musculoskeletal:      Right lower leg: No edema. Left lower leg: No edema. Skin:     General: Skin is warm. Neurological:      General: No focal deficit present. Mental Status: She is alert and oriented to person, place, and time. Motor: No weakness. Psychiatric:         Mood and Affect: Mood normal.         DIAGNOSTIC RESULTS     EKG: All EKG's are interpreted by NAKUL Gambino in the absence of a cardiologist.    EKG interpreted by myself - please refer to attending physician's note for complete EKG interpretation:    Rhythm: afib rvr  No evidence of acute ischemia or injury. RADIOLOGY:   Non-plain film images such as CT, Ultrasound and MRI are read by the radiologist. Plain radiographic images are visualized and preliminarily interpreted by NAKUL Gambino with the below findings:    Reviewed radiologist's interpretation. Interpretation per the Radiologist below, if available at the time of this note:    CT CHEST PULMONARY EMBOLISM W CONTRAST   Preliminary Result   1. No pulmonary embolus. 2. Large bilateral effusions with opacities in the mid to lower lung zone   which may represent atelectasis or infection. 3. Significant airway attenuation and secretions as described. 4. Moderate hiatal hernia. 5. Severe atherosclerosis.          XR CHEST PORTABLE   Final Result   Stable cardiomegaly with slowly resolving pulmonary edema      Hazy perihilar and bibasilar opacities which is slightly less prominent. Small bibasilar pleural effusions which are more apparent               LABS:  Labs Reviewed   CBC WITH AUTO DIFFERENTIAL - Abnormal; Notable for the following components:       Result Value    WBC 14.3 (*)     RBC 3.22 (*)     Hemoglobin 9.9 (*)     Hematocrit 30.1 (*)     RDW 15.7 (*)     Neutrophils Absolute 13.3 (*)     Lymphocytes Absolute 0.2 (*)     All other components within normal limits    Narrative:     Performed at:  [address] [employer] [address]   Phone [phone]   COMPREHENSIVE METABOLIC PANEL - Abnormal; Notable for the following components:    Sodium 148 (*)     CO2 33 (*)     Glucose 136 (*)     BUN 35 (*)     Total Protein 5.7 (*)     Albumin 3.1 (*)     AST 13 (*)     All other components within normal limits    Narrative:     Performed at:  [address] [facility] [employer] [address]   Phone [phone]   TROPONIN - Abnormal; Notable for the following components:    Troponin 0.02 (*)     All other components within normal limits    Narrative:     Performed at:  [address] [employer] [address]   Phone [phone]   BRAIN NATRIURETIC PEPTIDE - Abnormal; Notable for the following components:    Pro-BNP 9,563 (*)     All other components within normal limits    Narrative:     Performed at:  [address] [employer] [address]   Phone [phone]   CULTURE, BLOOD 2   CULTURE, BLOOD 1   URINE RT REFLEX TO CULTURE   COVID-19   COVID-19   COVID-19   COVID-19   LACTIC ACID, PLASMA       All other labs were within normal range or not returned as of this dictation.     EMERGENCY DEPARTMENT COURSE and DIFFERENTIAL DIAGNOSIS/MDM:   Vitals:    Vitals:    04/19/21 0158 04/19/21 0201 04/19/21 0237 04/19/21 0243   BP:  134/71     Pulse: 114 138 110 107   Resp: (!) 31 27 (!) 31 30   Temp:       TempSrc:       SpO2: 93% 92% 93% 92%   Weight:         Patient is afebrile pulse initially 130s to 140 with A. fib rapid ventricular response. She is not aware of history. Her EKG from 4 days ago when at the hospital for kyphoplasty did reveal A. fib. She is given an injection of Lopressor with significant improvement in rate under 115. She has large bilateral pleural effusions and opacities. She denies fever or cough. Will consult hospitalist for admission. CONSULTS:  IP CONSULT TO PHARMACY  IP CONSULT TO HOSPITALIST    PROCEDURES:  Procedures      FINAL IMPRESSION      1. Atrial fibrillation with rapid ventricular response ([address]    2. Pleural effusion    3. Shortness of breath    4. Pneumonia of both lower lobes due to infectious organism          DISPOSITION/PLAN   DISPOSITION Decision To Admit 04/19/2021 02:39:24 AM      PATIENT REFERRED TO:  No follow-up provider specified.     DISCHARGE MEDICATIONS:  New Prescriptions    No medications on file       (Please note that portions of this note were completed with a voice recognition program.  Efforts were made to edit the dictations but occasionally words are mis-transcribed.)    Clyda Kocher, [address], [place]  04/19/21 3938

## 2021-04-19 NOTE — ED PROVIDER NOTES
the review of systems was reviewed and negative. PAST MEDICAL HISTORY     Past Medical History:   Diagnosis Date    HTN (hypertension)     Hypercholesterolemia     MAC (mycobacterium avium-intracellulare complex)     Osteoarthritis     Osteopetrosis     Papanicolaou smear of cervix with atypical squamous cells of undetermined significance (ASC-US) 1998    Shingles     Stress incontinence        SURGICAL HISTORY       Past Surgical History:   Procedure Laterality Date    BREAST SURGERY      CATARACT REMOVAL Left 2009    CHOLECYSTECTOMY      CT BONE MARROW BIOPSY  2/12/2021    CT BONE MARROW BIOPSY 2/12/2021 [facility] CT    INGUINAL HERNIA REPAIR Right 2015    SPINE SURGERY N/A 11/25/2020    T7 KYPHOPLASTY WITH BONE BIOPSY performed by Genny Hagen MD at [place] [address] N/A 4/14/2021    T9 KYPHOPLASTY WITH BONE BIOPSY performed by Genny Hagen MD at [address] [place] [address]       Previous Medications    ACETAMINOPHEN (TYLENOL) 500 MG TABLET    Take 500 mg by mouth 3 times daily as needed. ALBUTEROL SULFATE  (90 BASE) MCG/ACT INHALER    INHALE 2 PUFF(S) BY MOUTH EVERY 4 HOURS AS NEEDED FOR WHEEZING    AMOXICILLIN-CLAVULANATE (AUGMENTIN) 875-125 MG PER TABLET    Take 1 tablet by mouth 2 times daily for 5 days    ASPIRIN 81 MG EC TABLET    Take 81 mg by mouth daily.       BUPROPION (WELLBUTRIN SR) 150 MG EXTENDED RELEASE TABLET    TAKE ONE TABLET BY MOUTH DAILY    DENOSUMAB (PROLIA) 60 MG/ML SOSY SC INJECTION    Inject 1 mL into the skin once for 1 dose    DILTIAZEM (TIAZAC) 120 MG EXTENDED RELEASE CAPSULE    TAKE ONE CAPSULE BY MOUTH once daily in the evening    DOCUSATE SODIUM (STOOL SOFTENER) 250 MG CAPSULE    Take 250 mg by mouth 2 times daily as needed for Constipation    FLUTICASONE-SALMETEROL (ADVAIR) 250-50 MCG/DOSE AEPB    INHALE ONE PUFF BY MOUTH TWICE A DAY    FUROSEMIDE (LASIX) 20 MG TABLET    TAKE ONE TABLET BY MOUTH DAILY    INCONTINENCE SUPPLY DISPOSABLE (INCONTINENCE BRIEF LARGE) MISC    As needed.  Approximately 150 per month    METOPROLOL SUCCINATE (TOPROL XL) 100 MG EXTENDED RELEASE TABLET    TAKE ONE TABLET BY MOUTH DAILY **CALL TO MAKE APPOINTMENT WITH DOCTOR **    MULTIPLE VITAMINS-MINERALS (MULTIVITAMIN ADULTS PO)    Take by mouth    MULTIPLE VITAMINS-MINERALS (PRESERVISION AREDS PO)    Take by mouth daily    NABUMETONE (RELAFEN) 750 MG TABLET    TAKE ONE TABLET BY MOUTH DAILY    THEOPHYLLINE (UNIPHYL) 400 MG EXTENDED RELEASE TABLET    TAKE 1/2 TABLET BY MOUTH TWICE A DAY    TIOTROPIUM (SPIRIVA HANDIHALER) 18 MCG INHALATION CAPSULE    INHALE THE ENTIRE CONTENTS OF 1 CAPSULE ONCE A DAY USING HANDIHALER DEVICE    TIZANIDINE (ZANAFLEX) 4 MG TABLET    TAKE ONE TABLET BY MOUTH THREE TIMES A DAY AS NEEDED    VITAMIN C (ASCORBIC ACID) 500 MG TABLET    Take 500 mg by mouth daily       ALLERGIES     Adhesive tape, Codeine, Iodine, and Lisinopril    FAMILY HISTORY        Family History   Problem Relation Age of Onset    Diabetes Mother     Cancer Sister     Coronary Art Dis Sister     Coronary Art Dis Brother     Stroke Brother        SOCIAL HISTORY       Social History     Socioeconomic History    Marital status:      Spouse name: None    Number of children: None    Years of education: None    Highest education level: None   Occupational History    None   Social Needs    Financial resource strain: None    Food insecurity     Worry: None     Inability: None    Transportation needs     Medical: None     Non-medical: None   Tobacco Use    Smoking status: Former Smoker     Packs/day: 0.50     Years: 65.00     Pack years: 32.50     Types: Cigarettes     Quit date: 10/6/2020     Years since quittin.5    Smokeless tobacco: Never Used    Tobacco comment: Advised to quit repeatedly, not interested   Substance and Sexual Activity    Alcohol use: Yes     Comment: rare    Drug use: No    Sexual activity: Never   Lifestyle    Physical activity     Days per week: None     Minutes per session: None    Stress: None   Relationships    Social connections     Talks on phone: None     Gets together: None     Attends Rastafari service: None     Active member of club or organization: None     Attends meetings of clubs or organizations: None     Relationship status: None    Intimate partner violence     Fear of current or ex partner: None     Emotionally abused: None     Physically abused: None     Forced sexual activity: None   Other Topics Concern    None   Social History Narrative    Self-breast exams: yes    Exercise: housework 3-4x/wk    Seatbelt use: Always    Living will: no,   additional information provided    Sexual activity: none         PHYSICAL EXAM       ED Triage Vitals   BP Temp Temp Source Pulse Resp SpO2 Height Weight   04/19/21 0043 04/19/21 0037 04/19/21 0037 04/19/21 0037 04/19/21 0037 04/19/21 0037 -- 04/19/21 0037   102/65 98.2 °F (36.8 °C) Oral 134 21 99 %  141 lb 1.5 oz (64 kg)       Physical Exam  Vitals signs and nursing note reviewed. Constitutional:       General: She is not in acute distress. Appearance: She is well-developed. She is ill-appearing. She is not toxic-appearing or diaphoretic. HENT:      Head: Normocephalic and atraumatic. Right Ear: External ear normal.      Left Ear: External ear normal.   Eyes:      General:         Right eye: No discharge. Left eye: No discharge. Conjunctiva/sclera: Conjunctivae normal.      Pupils: Pupils are equal, round, and reactive to light. Neck:      Musculoskeletal: Normal range of motion and neck supple. Cardiovascular:      Rate and Rhythm: Normal rate and regular rhythm. Heart sounds: No murmur. Pulmonary:      Effort: Pulmonary effort is normal. No respiratory distress. Breath sounds: Wheezing and rales present. Abdominal:      General: Bowel sounds are normal. There is no distension. Palpations: Abdomen is soft.  There is no mass.      Tenderness: There is no abdominal tenderness. There is no guarding or rebound. Genitourinary:     Comments: Deferred  Musculoskeletal: Normal range of motion. General: No deformity. Skin:     General: Skin is warm. Findings: No erythema or rash. Neurological:      Mental Status: She is alert and oriented to person, place, and time. She is not disoriented. Cranial Nerves: No cranial nerve deficit. Motor: No atrophy or abnormal muscle tone. Coordination: Coordination normal.   Psychiatric:         Behavior: Behavior normal.         Thought Content: Thought content normal.         DIAGNOSTIC RESULTS     EKG: All EKG's are interpreted by the Emergency Department Physician who either signs or Co-signs this chart in the absence of acardiologist.    EKG shows atrial fibrillation rapid ventricular response    RADIOLOGY:   Non-plain film images such as CT, Ultrasoundand MRI are read by the radiologist. Plain radiographic images are visualized and preliminarily interpreted by the emergency physician with the below findings:    Impression   1. No pulmonary embolus. 2. Large bilateral effusions with opacities in the mid to lower lung zone   which may represent atelectasis or infection. 3. Significant airway attenuation and secretions as described. 4. Moderate hiatal hernia. 5. Severe atherosclerosis.      ED BEDSIDE ULTRASOUND:   Performed by ED Physician - none    LABS:  Labs Reviewed   CBC WITH AUTO DIFFERENTIAL - Abnormal; Notable for the following components:       Result Value    WBC 14.3 (*)     RBC 3.22 (*)     Hemoglobin 9.9 (*)     Hematocrit 30.1 (*)     RDW 15.7 (*)     Neutrophils Absolute 13.3 (*)     Lymphocytes Absolute 0.2 (*)     All other components within normal limits    Narrative:     Performed at:  [address] [address]   Phone [phone]   COMPREHENSIVE METABOLIC PANEL - Abnormal; Notable for the following components:    Sodium 148 (*)     CO2 33 (*)     Glucose 136 (*)     BUN 35 (*)     Total Protein 5.7 (*)     Albumin 3.1 (*)     AST 13 (*)     All other components within normal limits    Narrative:     Performed at:  [address] [employer]   Phone [phone]   TROPONIN - Abnormal; Notable for the following components:    Troponin 0.02 (*)     All other components within normal limits    Narrative:     Performed at:  [address] [employer] [address]   Phone [phone]   BRAIN NATRIURETIC PEPTIDE - Abnormal; Notable for the following components:    Pro-BNP 9,563 (*)     All other components within normal limits    Narrative:     Performed at:  [address]   Phone [phone]   URINE RT REFLEX TO CULTURE - Abnormal; Notable for the following components:    Ketones, Urine TRACE (*)     Protein, UA TRACE (*)     All other components within normal limits    Narrative:     Performed at:  [address] [employer] [address]   Phone [phone]    Narrative:     Performed at:  [facility]  [address] [place] [address]   Phone [phone]   CULTURE, BLOOD 2   CULTURE, BLOOD 1   MICROSCOPIC URINALYSIS    Narrative:     Performed at:  [address] [employer]   Phone [phone]   LACTIC ACID, PLASMA       All other labs were withinnormal range or not returned as of this dictation.     EMERGENCY DEPARTMENT COURSE and DIFFERENTIAL DIAGNOSIS/MDM:     PMH, Surgical Hx, FH, Social Hx reviewed by myself (ETOH usage, Tobacco usage, Drug usage reviewed by myself, no pertinent Hx)- No Pertinent Hx     Old records were reviewed by me    Patient does have evidence of atrial fibrillation with rapid ventricular response. Lopressor given which improved heart rate. She could possibly have aspiration pneumonia for which antibiotics started. Patient appears to have large bilateral pleural effusions for which with diuresis initiated. She will be admitted for further inpatient evaluation. CRITICAL CARE TIME   Total Critical Caretime was 39 minutes, excluding separately reportable procedures. There was a high probability of clinically significant/life threatening deterioration in the patient's condition which required my urgent intervention. PROCEDURES:  Unlessotherwise noted below, none    FINAL IMPRESSION      1. Atrial fibrillation with rapid ventricular response ([address]    2. Pleural effusion    3. Shortness of breath    4. Pneumonia of both lower lobes due to infectious organism          DISPOSITION/PLAN   DISPOSITION Decision To Admit 04/19/2021 02:39:24 AM    PATIENT REFERRED TO:  No follow-up provider specified.     DISCHARGE MEDICATIONS:  New Prescriptions    No medications on file          (Please note that portions ofthis note were completed with a voice recognition program.  Efforts were made to edit the dictations but occasionally words are mis-transcribed.)    Ashanti Saldaña MD(electronically signed)  Attending Emergency Physician        Ashanti Saldaña MD  04/19/21 8160

## 2021-04-19 NOTE — H&P
MD   metoprolol succinate (TOPROL XL) 100 MG extended release tablet TAKE ONE TABLET BY MOUTH DAILY **CALL TO MAKE APPOINTMENT WITH DOCTOR ** 2/26/21   Sandra Gerber MD   buPROPion [facility]) 150 MG extended release tablet TAKE ONE TABLET BY MOUTH DAILY 2/26/21   Sandra Gerber MD   fluticasone-salmeterol (ADVAIR) 250-50 MCG/DOSE AEPB INHALE ONE PUFF BY MOUTH TWICE A DAY 2/1/21   Sandra Gerber MD   albuterol sulfate  (90 Base) MCG/ACT inhaler INHALE 2 PUFF(S) BY MOUTH EVERY 4 HOURS AS NEEDED FOR WHEEZING 2/1/21   Sandra Gerber MD   tiZANidine (ZANAFLEX) 4 MG tablet TAKE ONE TABLET BY MOUTH THREE TIMES A DAY AS NEEDED 2/1/21   Kyle Bryant MD   furosemide (LASIX) 20 MG tablet TAKE ONE TABLET BY MOUTH DAILY 1/20/21   Sandra Gerber MD   Incontinence Supply Disposable (INCONTINENCE BRIEF LARGE) MISC As needed.  Approximately 150 per month 1/19/21   Sandra Gerber MD   dilTIAZem [facility]) 120 MG extended release capsule TAKE ONE CAPSULE BY MOUTH once daily in the evening  Patient taking differently: Take 120 mg by mouth 2 times daily TAKE ONE CAPSULE BY MOUTH once daily in the evening 12/30/20   Sandra Gerber MD   theophylline (UNIPHYL) 400 MG extended release tablet TAKE 1/2 TABLET BY MOUTH TWICE A DAY 12/29/20   Sandra Gerber MD   nabumetone (RELAFEN) 750 MG tablet TAKE ONE TABLET BY MOUTH DAILY 12/9/20   Sandra Gerber MD   Multiple Vitamins-Minerals (PRESERVISION AREDS PO) Take by mouth daily    Historical Provider, MD   Multiple Vitamins-Minerals (MULTIVITAMIN ADULTS PO) Take by mouth    Historical Provider, MD   vitamin C (ASCORBIC ACID) 500 MG tablet Take 500 mg by mouth daily    Historical Provider, MD   docusate sodium (STOOL SOFTENER) 250 MG capsule Take 250 mg by mouth 2 times daily as needed for Constipation    Historical Provider, MD   tiotropium (SPIRIVA HANDIHALER) 18 MCG inhalation capsule INHALE THE ENTIRE CONTENTS OF 1 CAPSULE ONCE A DAY USING HANDIHALER DEVICE 10/16/20   Sandra Gerber MD dilTIAZem (CARDIZEM CD) 120 MG extended release capsule TAKE ONE CAPSULE BY MOUTH TWICE A DAY 7/7/20 12/30/20  Turner Russell MD   acetaminophen (TYLENOL) 500 MG tablet Take 500 mg by mouth 3 times daily as needed. Historical Provider, MD   aspirin 81 MG EC tablet Take 81 mg by mouth daily. Historical Provider, MD       Allergies:  Adhesive tape, Codeine, Iodine, and Lisinopril    Social History:      The patient currently lives at a NH    TOBACCO:   reports that she quit smoking about 6 months ago. Her smoking use included cigarettes. She has a 32.50 pack-year smoking history. She has never used smokeless tobacco.  ETOH:   reports current alcohol use. E-Cigarettes/Vaping Use     Questions Responses    E-Cigarette/Vaping Use     Start Date     Passive Exposure     Quit Date     Counseling Given     Comments             Family History:      Reviewed in detail and Positive as follows:        Problem Relation Age of Onset    Diabetes Mother     Cancer Sister     Coronary Art Dis Sister     Coronary Art Dis Brother     Stroke Brother        REVIEW OF SYSTEMS:   Pertinent positives as noted in the HPI. All other systems reviewed and negative. PHYSICAL EXAM PERFORMED:    /71   Pulse 86   Temp 97.8 °F (36.6 °C) (Oral)   Resp 18   Ht 5' 7\" (1.702 m)   Wt 138 lb 7.2 oz (62.8 kg)   SpO2 94%   BMI 21.68 kg/m²     General appearance:  No apparent distress  HEENT:  Normal cephalic  Neck: Supple  Respiratory:  Diminished breath sounds bilaterally. Cardiovascular:  Irregularly irregular. Abdomen: Soft, non-tender, non-distended. Musculoskeletal:  No clubbing, cyanosis, bilateral leg edema, L>R   Skin: Skin color, texture, turgor normal.  No rashes or lesions. Neurologic:  Neurovascularly intact without any focal sensory/motor deficits.  Cranial nerves: II-XII intact, grossly non-focal.  Psychiatric:  Alert and oriented  Capillary Refill: Brisk,< 3 seconds   Peripheral Pulses: +2 palpable, equal bilaterally       Labs:     Recent Labs     04/19/21 0100   WBC 14.3*   HGB 9.9*   HCT 30.1*        Recent Labs     04/19/21 0100   *   K 4.7      CO2 33*   BUN 35*   CREATININE 0.8   CALCIUM 8.5     Recent Labs     04/19/21 0100   AST 13*   ALT 28   BILITOT <0.2   ALKPHOS 129     No results for input(s): INR in the last 72 hours. Recent Labs     04/19/21 0100   TROPONINI 0.02*       Urinalysis:      Lab Results   Component Value Date    NITRU Negative 04/19/2021    WBCUA 1 04/19/2021    BACTERIA 3+ 04/03/2021    RBCUA 2 04/19/2021    BLOODU Negative 04/19/2021    SPECGRAV 1.024 04/19/2021    GLUCOSEU Negative 04/19/2021       Radiology:     CXR: I have reviewed the CXR with the following interpretation: BILATERAL PLEURAL EFFUSION   EKG:  I have reviewed the EKG with the following interpretation: afib with RVR     CT CHEST PULMONARY EMBOLISM W CONTRAST   Preliminary Result   1. No pulmonary embolus. 2. Large bilateral effusions with opacities in the mid to lower lung zone   which may represent atelectasis or infection. 3. Significant airway attenuation and secretions as described. 4. Moderate hiatal hernia. 5. Severe atherosclerosis. XR CHEST PORTABLE   Final Result   Stable cardiomegaly with slowly resolving pulmonary edema      Hazy perihilar and bibasilar opacities which is slightly less prominent.       Small bibasilar pleural effusions which are more apparent             ASSESSMENT:    Active Hospital Problems    Diagnosis Date Noted    COPD (chronic obstructive pulmonary disease) ([facility] [address] [J44.9] 04/09/2014     Priority: High    Tobacco abuse [Z72.0] 05/16/2013     Priority: High    Essential hypertension [I10] 07/15/2011     Priority: High    Low back pain [M54.5] 03/23/2015     Priority: Medium    Atrial fibrillation with rapid ventricular response ([facility] [address] [I48.91] 04/19/2021    Bilateral pleural effusion [J90] 04/19/2021    Pneumonia [J18.9] 04/19/2021    Elevated troponin [R77.8] 04/19/2021    Elevated brain natriuretic peptide (BNP) level [R79.89] 04/19/2021    Anemia [D64.9] 04/19/2021    Compression fracture of T7 vertebra ([facility] [address] [S22.060A] 10/22/2020         PLAN:    1. Atrial fibrillation with rapid ventricular response, on Cardizem drip, started on Eliquis, echo ordered, TSH ordered, cardiology were consulted, plan of care discussed with patient in detail, all questions answered. 2.  Possible pneumonia, with her recent history of dysphagia, possibly aspiration, started on Unasyn and azithromycin, procalcitonin ordered and pending, patient does not look severely sick, denies obvious pneumonia symptoms  3. Bilateral pleural effusion, suspecting element of heart failure could be triggered by A. fib with rapid ventricular response, BNP elevated echo pending  4. COPD, no obvious exacerbation at this time  5. Dysphagia with possible Zenker's diverticulum GI consulted  6. Essential hypertension, resume p.o. medications  7. Minimally elevated troponin, likely due to tachyarrhythmia, will repeat  8. Anemia, appears chronic, no obvious bleeding repeat CBC in a.m.  9.  Neurolysed weakness, due to above  10. Goals of care, patient is okay with aggressive management at this time but she wants to be a DNR CCA    DVT Prophylaxis: eliquis  Diet: DIET CARDIAC;  Code Status: DNR-CCA    PT/OT Eval Status: ordered    Dispo - inpatient 2-3 days       Bere Brush MD    Thank you Dimitris Mariee MD for the opportunity to be involved in this patient's care. If you have any questions or concerns please feel free to contact me at [phone].

## 2021-04-19 NOTE — ED NOTES
Bed: E-45  Expected date:   Expected time:   Means of arrival:   Comments:  [facility] , RN  04/19/21 4828

## 2021-04-19 NOTE — CONSULTS
Referring Physician: Dr. Gorman Monday  Reason for Consultation: CHF exacerbation  Chief Complaint: Short of breath    Subjective:   History of Present Illness:  Cris Montejo is a 78 y.o. patient who presented to the hospital from a nursing home with complaints of worsening shortness of breath. The patient endorses chronic shortness of breath that she assumed was related to her COPD. Earlier this month she was admitted to the hospital with a COPD exacerbation and discharged to a nursing home. She felt that she never really improved from that hospitalization. She denies any acute severe worsening of the dyspnea but just felt that she was not improving. The patient has a poor functional status and states that she is too weak to walk. She endorses chronic lower extremity edema which she feels has worsened. She was in atrial fibrillation with a rapid ventricular response on admission but denies palpitations or associated chest pains. She was started on IV Lasix and does note some mild improvement in the shortness of breath. Past Medical History:   has a past medical history of HTN (hypertension), Hypercholesterolemia, MAC (mycobacterium avium-intracellulare complex), Osteoarthritis, Osteopetrosis, Papanicolaou smear of cervix with atypical squamous cells of undetermined significance (ASC-US), Shingles, and Stress incontinence. Surgical History:   has a past surgical history that includes Cholecystectomy; Breast surgery; Cataract removal (Left, 2009); Inguinal hernia repair (Right, 2015); Spine surgery (N/A, 11/25/2020); CT BIOPSY BONE MARROW (2/12/2021); and Spine surgery (N/A, 4/14/2021). Social History:   reports that she quit smoking about 6 months ago. Her smoking use included cigarettes. She has a 32.50 pack-year smoking history. She has never used smokeless tobacco. She reports current alcohol use. She reports that she does not use drugs.      Family History:  family history includes Cancer Historical Provider, MD   vitamin C (ASCORBIC ACID) 500 MG tablet Take 500 mg by mouth daily    Historical Provider, MD   docusate sodium (STOOL SOFTENER) 250 MG capsule Take 250 mg by mouth 2 times daily as needed for Constipation    Historical Provider, MD   tiotropium (SPIRIVA HANDIHALER) 18 MCG inhalation capsule INHALE THE ENTIRE CONTENTS OF 1 CAPSULE ONCE A DAY USING HANDIHALER DEVICE 10/16/20   Eliud Sherman MD   dilTIAZem (CARDIZEM CD) 120 MG extended release capsule TAKE ONE CAPSULE BY MOUTH TWICE A DAY 7/7/20 12/30/20  Eliud Sherman MD   acetaminophen (TYLENOL) 500 MG tablet Take 500 mg by mouth 3 times daily as needed. Historical Provider, MD   aspirin 81 MG EC tablet Take 81 mg by mouth daily. Historical Provider, MD      CURRENT Medications:  aspirin EC tablet 81 mg, Daily  buPROPion [facility]) extended release tablet 150 mg, Daily  budesonide-formoterol (SYMBICORT) 160-4.5 MCG/ACT inhaler 2 puff, BID  metoprolol succinate (TOPROL XL) extended release tablet 50 mg, Daily  sodium chloride flush 0.9 % injection 5-40 mL, 2 times per day  sodium chloride flush 0.9 % injection 5-40 mL, PRN  0.9 % sodium chloride infusion, PRN  promethazine (PHENERGAN) tablet 12.5 mg, Q6H PRN    Or  ondansetron (ZOFRAN) injection 4 mg, Q6H PRN  furosemide (LASIX) injection 40 mg, BID  azithromycin (ZITHROMAX) 500 mg in D5W 250ml addavial, Q24H  dilTIAZem 125 mg in dextrose 5 % 125 mL infusion, Continuous  morphine (PF) injection 2 mg, Q3H PRN  acetaminophen (TYLENOL) tablet 650 mg, Q6H PRN    Or  acetaminophen (TYLENOL) suppository 650 mg, Q6H PRN  apixaban (ELIQUIS) tablet 5 mg, BID  ampicillin-sulbactam (UNASYN) 1500 mg IVPB minibag, Q6H    Allergies:  Adhesive tape, Codeine, Iodine, and Lisinopril     Review of Systems:   · Constitutional: no unanticipated weight loss. There's been no change in energy level, sleep pattern, or activity level. No fevers, chills. · Eyes: No visual changes or diplopia.  No scleral icterus. · ENT: No Headaches, hearing loss or vertigo. No mouth sores or sore throat. · Cardiovascular: as reviewed in HPI  · Respiratory: No cough or wheezing, no sputum production. No hemoptysis. · Gastrointestinal: No abdominal pain, appetite loss, blood in stools. No change in bowel or bladder habits. · Genitourinary: No dysuria, trouble voiding, or hematuria. · Musculoskeletal:  No gait disturbance, no joint complaints. · Integumentary: No rash or pruritis. · Neurological: No headache, diplopia, change in muscle strength, numbness or tingling. · Psychiatric: No anxiety or depression. · Endocrine: No temperature intolerance. No excessive thirst, fluid intake, or urination. No tremor. · Hematologic/Lymphatic: No abnormal bruising or bleeding, blood clots or swollen lymph nodes. · Allergic/Immunologic: No nasal congestion or hives. Objective:   PHYSICAL EXAM:    Vitals:    04/19/21 0837   BP: 115/71   Pulse: 86   Resp: 18   Temp: 97.8   SpO2: 94% on 3 L    Weight: 138 lb 7.2 oz (62.8 kg)       General Appearance:  Alert, cooperative, no distress at rest.  Elderly. Frail. Head:  Normocephalic, without obvious abnormality, atraumatic. Eyes:  Pupils equal and round. No scleral icterus. Mouth: Moist mucosa, no pharyngeal erythema. Nose: Nares normal. No drainage or sinus tenderness. Neck: Supple, symmetrical, trachea midline. No adenopathy. No tenderness/mass/nodules. No carotid bruit or elevated JVD. Lungs:   Respirations unlabored at rest.  Wearing O2. Diminished breath sounds to lower one half lung field bilaterally. Chest Wall:  No tenderness or deformity. Heart:  Regular rate. S1/S2 normal. No murmur, rub, or gallop. Abdomen:   Soft, non-tender, bowel sounds active. Musculoskeletal: No muscle wasting or digital clubbing. Extremities: Extremities normal, atraumatic. No cyanosis. 2+ BLE edema. Pulses: 2+ radial and carotid pulses, symmetric.    Skin: No rashes or troponin likely related to tachycardia and CHF in a patient presumably with CAD. No plans for stress testing or angiography with advanced age and limited functional status. 4) Chronic respiratory failure with hypoxia/COPD. Diurese as tolerated and avoid hypoxia. Will defer additional management to primary team.    5) Large bilateral pleural effusions. If no clinical improvement, would consider thoracentesis but will defer to primary team.    6) CAD/PAD. Vascular calcifications noted on chest CT. Discontinue aspirin with use of DOAC. Start statin therapy. 7) Anemia/plasma cell neoplasm/multiple myeloma. Follows with [facility]. Overall, the problems requiring hospitalization are high in severity. Thank you for allowing us to participate in the care of [address] [place] [address]. Linda Guallpa.  Symone Chairez, [address] [place] [address] Road  4/19/2021 10:05 AM

## 2021-04-19 NOTE — ED NOTES
Report called to [facility]3].  Pt going to room [address] [facility], RN  04/19/21 9174

## 2021-04-20 LAB
A/G RATIO: 1.4 (ref 1.1–2.2)
ALBUMIN SERPL-MCNC: 3 G/DL (ref 3.4–5)
ALP BLD-CCNC: 115 U/L (ref 40–129)
ALT SERPL-CCNC: 21 U/L (ref 10–40)
ANION GAP SERPL CALCULATED.3IONS-SCNC: 9 MMOL/L (ref 3–16)
AST SERPL-CCNC: 11 U/L (ref 15–37)
BASOPHILS ABSOLUTE: 0 K/UL (ref 0–0.2)
BASOPHILS RELATIVE PERCENT: 0 %
BILIRUB SERPL-MCNC: <0.2 MG/DL (ref 0–1)
BUN BLDV-MCNC: 28 MG/DL (ref 7–20)
CALCIUM SERPL-MCNC: 7.1 MG/DL (ref 8.3–10.6)
CHLORIDE BLD-SCNC: 99 MMOL/L (ref 99–110)
CO2: 33 MMOL/L (ref 21–32)
CREAT SERPL-MCNC: 0.7 MG/DL (ref 0.6–1.2)
EOSINOPHILS ABSOLUTE: 0 K/UL (ref 0–0.6)
EOSINOPHILS RELATIVE PERCENT: 0 %
GFR AFRICAN AMERICAN: >60
GFR NON-AFRICAN AMERICAN: >60
GLOBULIN: 2.2 G/DL
GLUCOSE BLD-MCNC: 148 MG/DL (ref 70–99)
HCT VFR BLD CALC: 29 % (ref 36–48)
HEMOGLOBIN: 9.6 G/DL (ref 12–16)
LYMPHOCYTES ABSOLUTE: 0.2 K/UL (ref 1–5.1)
LYMPHOCYTES RELATIVE PERCENT: 1.1 %
MCH RBC QN AUTO: 31 PG (ref 26–34)
MCHC RBC AUTO-ENTMCNC: 33.2 G/DL (ref 31–36)
MCV RBC AUTO: 93.3 FL (ref 80–100)
MONOCYTES ABSOLUTE: 0.9 K/UL (ref 0–1.3)
MONOCYTES RELATIVE PERCENT: 5.7 %
NEUTROPHILS ABSOLUTE: 15.1 K/UL (ref 1.7–7.7)
NEUTROPHILS RELATIVE PERCENT: 93.2 %
PDW BLD-RTO: 16.3 % (ref 12.4–15.4)
PLATELET # BLD: 193 K/UL (ref 135–450)
PMV BLD AUTO: 8.2 FL (ref 5–10.5)
POTASSIUM SERPL-SCNC: 3.1 MMOL/L (ref 3.5–5.1)
RBC # BLD: 3.11 M/UL (ref 4–5.2)
SODIUM BLD-SCNC: 141 MMOL/L (ref 136–145)
TOTAL PROTEIN: 5.2 G/DL (ref 6.4–8.2)
WBC # BLD: 16.2 K/UL (ref 4–11)

## 2021-04-20 PROCEDURE — 2060000000 HC ICU INTERMEDIATE R&B

## 2021-04-20 PROCEDURE — 6360000002 HC RX W HCPCS: Performed by: INTERNAL MEDICINE

## 2021-04-20 PROCEDURE — 80053 COMPREHEN METABOLIC PANEL: CPT

## 2021-04-20 PROCEDURE — 2580000003 HC RX 258: Performed by: INTERNAL MEDICINE

## 2021-04-20 PROCEDURE — 94761 N-INVAS EAR/PLS OXIMETRY MLT: CPT

## 2021-04-20 PROCEDURE — P9047 ALBUMIN (HUMAN), 25%, 50ML: HCPCS | Performed by: INTERNAL MEDICINE

## 2021-04-20 PROCEDURE — 2500000003 HC RX 250 WO HCPCS: Performed by: INTERNAL MEDICINE

## 2021-04-20 PROCEDURE — 6360000002 HC RX W HCPCS: Performed by: STUDENT IN AN ORGANIZED HEALTH CARE EDUCATION/TRAINING PROGRAM

## 2021-04-20 PROCEDURE — 6370000000 HC RX 637 (ALT 250 FOR IP): Performed by: STUDENT IN AN ORGANIZED HEALTH CARE EDUCATION/TRAINING PROGRAM

## 2021-04-20 PROCEDURE — 6370000000 HC RX 637 (ALT 250 FOR IP): Performed by: INTERNAL MEDICINE

## 2021-04-20 PROCEDURE — 99223 1ST HOSP IP/OBS HIGH 75: CPT | Performed by: INTERNAL MEDICINE

## 2021-04-20 PROCEDURE — 94640 AIRWAY INHALATION TREATMENT: CPT

## 2021-04-20 PROCEDURE — 36415 COLL VENOUS BLD VENIPUNCTURE: CPT

## 2021-04-20 PROCEDURE — 99233 SBSQ HOSP IP/OBS HIGH 50: CPT | Performed by: INTERNAL MEDICINE

## 2021-04-20 PROCEDURE — 85025 COMPLETE CBC W/AUTO DIFF WBC: CPT

## 2021-04-20 PROCEDURE — 94660 CPAP INITIATION&MGMT: CPT

## 2021-04-20 PROCEDURE — 2700000000 HC OXYGEN THERAPY PER DAY

## 2021-04-20 RX ORDER — POTASSIUM CHLORIDE 750 MG/1
40 TABLET, FILM COATED, EXTENDED RELEASE ORAL ONCE
Status: COMPLETED | OUTPATIENT
Start: 2021-04-20 | End: 2021-04-20

## 2021-04-20 RX ORDER — IPRATROPIUM BROMIDE AND ALBUTEROL SULFATE 2.5; .5 MG/3ML; MG/3ML
1 SOLUTION RESPIRATORY (INHALATION) ONCE
Status: COMPLETED | OUTPATIENT
Start: 2021-04-20 | End: 2021-04-20

## 2021-04-20 RX ORDER — FUROSEMIDE 10 MG/ML
20 INJECTION INTRAMUSCULAR; INTRAVENOUS ONCE
Status: COMPLETED | OUTPATIENT
Start: 2021-04-20 | End: 2021-04-20

## 2021-04-20 RX ORDER — POTASSIUM CHLORIDE 750 MG/1
40 TABLET, FILM COATED, EXTENDED RELEASE ORAL ONCE
Status: DISCONTINUED | OUTPATIENT
Start: 2021-04-20 | End: 2021-04-21

## 2021-04-20 RX ORDER — ALBUMIN (HUMAN) 12.5 G/50ML
25 SOLUTION INTRAVENOUS 2 TIMES DAILY
Status: DISCONTINUED | OUTPATIENT
Start: 2021-04-20 | End: 2021-04-21

## 2021-04-20 RX ORDER — AZITHROMYCIN 500 MG/1
500 TABLET, FILM COATED ORAL DAILY
Status: DISCONTINUED | OUTPATIENT
Start: 2021-04-20 | End: 2021-04-20

## 2021-04-20 RX ADMIN — DILTIAZEM HYDROCHLORIDE 10 MG/HR: 5 INJECTION, SOLUTION INTRAVENOUS at 03:20

## 2021-04-20 RX ADMIN — METRONIDAZOLE 500 MG: 500 INJECTION, SOLUTION INTRAVENOUS at 18:04

## 2021-04-20 RX ADMIN — CEFEPIME HYDROCHLORIDE 2000 MG: 2 INJECTION, POWDER, FOR SOLUTION INTRAVENOUS at 20:29

## 2021-04-20 RX ADMIN — FUROSEMIDE 20 MG: 10 INJECTION, SOLUTION INTRAMUSCULAR; INTRAVENOUS at 03:41

## 2021-04-20 RX ADMIN — FUROSEMIDE 5 MG/HR: 10 INJECTION, SOLUTION INTRAMUSCULAR; INTRAVENOUS at 12:23

## 2021-04-20 RX ADMIN — Medication 10 ML: at 09:40

## 2021-04-20 RX ADMIN — DILTIAZEM HYDROCHLORIDE 10 MG/HR: 5 INJECTION, SOLUTION INTRAVENOUS at 23:01

## 2021-04-20 RX ADMIN — BUPROPION HYDROCHLORIDE 150 MG: 150 TABLET, EXTENDED RELEASE ORAL at 09:39

## 2021-04-20 RX ADMIN — SODIUM CHLORIDE 1500 MG: 900 INJECTION INTRAVENOUS at 09:39

## 2021-04-20 RX ADMIN — METOPROLOL SUCCINATE 50 MG: 50 TABLET, EXTENDED RELEASE ORAL at 09:39

## 2021-04-20 RX ADMIN — FUROSEMIDE 40 MG: 10 INJECTION, SOLUTION INTRAMUSCULAR; INTRAVENOUS at 09:40

## 2021-04-20 RX ADMIN — METRONIDAZOLE 500 MG: 500 INJECTION, SOLUTION INTRAVENOUS at 11:45

## 2021-04-20 RX ADMIN — ALBUMIN (HUMAN) 25 G: 0.25 INJECTION, SOLUTION INTRAVENOUS at 13:25

## 2021-04-20 RX ADMIN — IPRATROPIUM BROMIDE 0.5 MG: 0.5 SOLUTION RESPIRATORY (INHALATION) at 21:10

## 2021-04-20 RX ADMIN — CEFEPIME HYDROCHLORIDE 2000 MG: 2 INJECTION, POWDER, FOR SOLUTION INTRAVENOUS at 11:03

## 2021-04-20 RX ADMIN — APIXABAN 5 MG: 5 TABLET, FILM COATED ORAL at 09:39

## 2021-04-20 RX ADMIN — POTASSIUM CHLORIDE 40 MEQ: 750 TABLET, FILM COATED, EXTENDED RELEASE ORAL at 09:40

## 2021-04-20 RX ADMIN — DILTIAZEM HYDROCHLORIDE 15 MG/HR: 5 INJECTION, SOLUTION INTRAVENOUS at 13:25

## 2021-04-20 RX ADMIN — ALBUMIN (HUMAN) 25 G: 0.25 INJECTION, SOLUTION INTRAVENOUS at 19:14

## 2021-04-20 RX ADMIN — IPRATROPIUM BROMIDE AND ALBUTEROL SULFATE 1 AMPULE: .5; 3 SOLUTION RESPIRATORY (INHALATION) at 03:39

## 2021-04-20 RX ADMIN — SODIUM CHLORIDE 1500 MG: 900 INJECTION INTRAVENOUS at 03:57

## 2021-04-20 RX ADMIN — IPRATROPIUM BROMIDE 0.5 MG: 0.5 SOLUTION RESPIRATORY (INHALATION) at 15:48

## 2021-04-20 RX ADMIN — IPRATROPIUM BROMIDE 0.5 MG: 0.5 SOLUTION RESPIRATORY (INHALATION) at 11:23

## 2021-04-20 RX ADMIN — BUDESONIDE AND FORMOTEROL FUMARATE DIHYDRATE 2 PUFF: 160; 4.5 AEROSOL RESPIRATORY (INHALATION) at 08:15

## 2021-04-20 ASSESSMENT — PAIN SCALES - GENERAL
PAINLEVEL_OUTOF10: 0
PAINLEVEL_OUTOF10: 0

## 2021-04-20 NOTE — CARE COORDINATION
INITIAL CASE MANAGEMENT ASSESSMENT     Reviewed chart, attempted to meet with patient. MD was at bedside. Called to patient's daughter, Bipin Elliott ([phone]) to assess possible discharge needs. Explained Case Management role/services. Patient is from [facility]. Per daughter, the plan is for patient to return at discharge for skilled level of care before returning home to patient's apartment. ADLs: Independent      DME: Oxygen - need to verify company   Shower chair with back(cleans up at sink), raised toilet seat with rails,Hand-held shower,  Cane, Standard walker, Alert Button     PT/OT Recs: Not ordered; moderate assistance in room      Active Services:      Transportation: Family transports     Medications: [facility] [place]     PCP: Lesly Winchester MD      HD/PD: N/A    PLAN/COMMENTS: Per daughter, the plan for patient is to return to [facility] SN[facility] at discharge for strengthening. Prior to returning home to patient's apartment. Discussed COA resources. Daughter stated she is familiar with COA and requested online referral be placed for patient. SW placed referral and provided daughter's contact information, per request.     Called to Kelly Umanzor at [facility] ([phone]). Left voicemail requesting return phone call regarding patient's ability to return at discharge. SW/CM provided contact information for patient or family to call with any questions. SW/CM will follow and assist as needed. ESTEFANI Lopez, [place], Social Work  [phone]  Electronically signed by ESTEFANI Lopez LSW on 4/20/2021 at 9:24 AM    Spoke with Kelly Umanzor at [facility]. Patient's bed IS NOT HELD, but patient can be re-reviewed when nearing discharge.    ESTEFANI Lopez, MAGGI, Social Work/Case Management   [phone]  Electronically signed by ESTEFANI Lopez LSW on 4/20/2021 at 10:55 AM

## 2021-04-20 NOTE — PROGRESS NOTES
Pt turned to her side, got short of breath, oxygen saturations dropped to 70s and HR went up to 140-150s. Had to be put on the nonrebreather at [facility] Dr Ventura Quintanilla, ordered Lasix 20mg and one time dose of Duoneb and agreed with restarting Cardizem drip. Restarted on Cardizem drip.    0408H - currenty now at 6lpm of high flow. On cardizem at 15lpm.     0435H - increased to 8lpm of oxygen. Still on Cardizem at 15mg/hr. -120s. Still on Afib RVR.     0548H - increased to 15lpm of oxygen. HR 90-110s on Afib RVR. Still at Cardizem at 15mg/hr.

## 2021-04-20 NOTE — PLAN OF CARE
Participates in care planning  Outcome: Ongoing     Problem: Airway Clearance - Ineffective:  Goal: Clear lung sounds  Description: Clear lung sounds  Outcome: Ongoing  Goal: Ability to maintain a clear airway will improve  Description: Ability to maintain a clear airway will improve  Outcome: Ongoing     Problem: Fluid Volume - Deficit:  Goal: Achieves intake and output within specified parameters  Description: Achieves intake and output within specified parameters  Outcome: Ongoing     Problem: Gas Exchange - Impaired:  Goal: Levels of oxygenation will improve  Description: Levels of oxygenation will improve  Outcome: Ongoing     Problem:  Activity:  Goal: Ability to tolerate increased activity will improve  Description: Ability to tolerate increased activity will improve  Outcome: Ongoing  Goal: Expression of feelings of increased energy will increase  Description: Expression of feelings of increased energy will increase  Outcome: Ongoing     Problem: Cardiac:  Goal: Ability to maintain an adequate cardiac output will improve  Description: Ability to maintain an adequate cardiac output will improve  Outcome: Ongoing  Goal: Complications related to the disease process, condition or treatment will be avoided or minimized  Description: Complications related to the disease process, condition or treatment will be avoided or minimized  Outcome: Ongoing     Problem: Coping:  Goal: Level of anxiety will decrease  Description: Level of anxiety will decrease  Outcome: Ongoing  Goal: General experience of comfort will improve  Description: General experience of comfort will improve  Outcome: Ongoing     Problem: Health Behavior:  Goal: Ability to manage health-related needs will improve  Description: Ability to manage health-related needs will improve  Outcome: Ongoing     Problem: Safety:  Goal: Ability to remain free from injury will improve  Description: Ability to remain free from injury will improve  Outcome: Ongoing  Goal: Will show no signs and symptoms of excessive bleeding  Description: Will show no signs and symptoms of excessive bleeding  Outcome: Ongoing

## 2021-04-20 NOTE — PROGRESS NOTES
Hospitalist Progress Note      PCP: Robert Bridges MD    Date of Admission: 4/19/2021        Subjective: having more SOB, no chest pain or dizziness. Medications:  Reviewed    Infusion Medications    sodium chloride      dilTIAZem 10 mg/hr (04/20/21 0634)     Scheduled Medications    buPROPion  150 mg Oral Daily    budesonide-formoterol  2 puff Inhalation BID    metoprolol succinate  50 mg Oral Daily    sodium chloride flush  5-40 mL Intravenous 2 times per day    furosemide  40 mg Intravenous BID    azithromycin  500 mg Intravenous Q24H    apixaban  5 mg Oral BID    ampicillin-sulbactam  1,500 mg Intravenous Q6H    atorvastatin  10 mg Oral Daily     PRN Meds: sodium chloride flush, sodium chloride, promethazine **OR** ondansetron, morphine, acetaminophen **OR** acetaminophen      Intake/Output Summary (Last 24 hours) at 4/20/2021 0750  Last data filed at 4/20/2021 0600  Gross per 24 hour   Intake 540 ml   Output 2600 ml   Net -2060 ml       Physical Exam Performed:    /81   Pulse 110   Temp 97.5 °F (36.4 °C) (Oral)   Resp 26   Ht 5' 7\" (1.702 m)   Wt 132 lb 11.5 oz (60.2 kg)   SpO2 92%   BMI 20.79 kg/m²     General appearance: No apparent distress  Neck: Supple  Respiratory:  Diminished breath sounds bilaterally. Cardiovascular: irregularly irregular   Abdomen: Soft, non-tender, non-distended   Musculoskeletal: No clubbing, cyanosis  Skin: Skin color, texture, turgor normal.  No rashes or lesions.   Neurologic:  No focal weakness   Psychiatric: Alert and oriented  Capillary Refill: Brisk,3 seconds, normal   Peripheral Pulses: +2 palpable, equal bilaterally       Labs:   Recent Labs     04/19/21 0100 04/20/21  0506   WBC 14.3* 16.2*   HGB 9.9* 9.6*   HCT 30.1* 29.0*    193     Recent Labs     04/19/21 0100 04/20/21  0506   * 141   K 4.7 3.1*    99   CO2 33* 33*   BUN 35* 28*   CREATININE 0.8 0.7   CALCIUM 8.5 7.1*     Recent Labs     04/19/21 0100 04/20/21  0506   AST 13* 11*   ALT 28 21   BILITOT <0.2 <0.2   ALKPHOS 129 115     No results for input(s): INR in the last 72 hours. Recent Labs     04/19/21  0100 04/19/21  0923   TROPONINI 0.02* <0.01       Urinalysis:      Lab Results   Component Value Date    NITRU Negative 04/19/2021    WBCUA 1 04/19/2021    BACTERIA 3+ 04/03/2021    RBCUA 2 04/19/2021    BLOODU Negative 04/19/2021    SPECGRAV 1.024 04/19/2021    GLUCOSEU Negative 04/19/2021       Radiology:  VL Extremity Venous Bilateral         CT CHEST PULMONARY EMBOLISM W CONTRAST   Final Result   1. No pulmonary embolus. 2. Large bilateral effusions with opacities in the mid to lower lung zone   which may represent atelectasis or infection. 3. Significant airway attenuation and secretions as described. 4. Moderate hiatal hernia. 5. Severe atherosclerosis. XR CHEST PORTABLE   Final Result   Stable cardiomegaly with slowly resolving pulmonary edema      Hazy perihilar and bibasilar opacities which is slightly less prominent.       Small bibasilar pleural effusions which are more apparent         VASCULAR REPORT    (Results Pending)           Assessment/Plan:    Active Hospital Problems    Diagnosis    COPD (chronic obstructive pulmonary disease) ([facility] [address] [J44.9]     Priority: High    Tobacco abuse [Z72.0]     Priority: High    Essential hypertension [I10]     Priority: High    Low back pain [M54.5]     Priority: Medium    Atrial fibrillation with rapid ventricular response (HCC) [I48.91]    Bilateral pleural effusion [J90]    Pneumonia [J18.9]    Elevated troponin [R77.8]    Elevated brain natriuretic peptide (BNP) level [R79.89]    Anemia [D64.9]    Severe malnutrition (HCC) [E43]    Acute on chronic diastolic heart failure (HCC) [I50.33]    Demand ischemia (HCC) [I24.8]    Chronic respiratory failure with hypoxia (HCC) [J96.11]    PAD (peripheral artery disease) (HCC) [I73.9]    Coronary artery disease involving native coronary artery of native heart without angina pectoris [I25.10]    Compression fracture of T7 vertebra ([facility] [address] [S22.060A]     1. Atrial fibrillation with rapid ventricular response, on Cardizem drip, started on Eliquis, which is on hold for possible thoracentesis,  echo noted with preserved EF, cardiology consulted. Will increase Cardizem drip as BP better now, if no response, will change to amio. 2.  Possible pneumonia, with her recent history of dysphagia, possibly aspiration, started on Unasyn and azithromycin. Changed to cefepime and flagyl. 3.  Bilateral pleural effusion, suspecting element of heart failure could be triggered by A. fib with rapid ventricular response, for thoracentesis in am.   4.  Acute on chronic diastolic heart failure, triggered by afib, cardiology consulted. Lasix drip. 5.  Dysphagia with possible Zenker's diverticulum GI consulted  6. Acute on Chronic respiratory failure with hypoxia, on oxygen. Worsening, still on lasix, changed to drip, added albumin, antibiotics, consulted pulmonology. 7.  Minimally elevated troponin, likely due to tachyarrhythmia, will repeat  8. Anemia, appears chronic, no obvious bleeding, following up as outpatient. 9.  Generalized weakness, due to above  10. Goals of care, patient is okay with aggressive management at this time but she wants to be a DNR CCA  11. COPD, no obvious exacerbation at this time  12. Essential hypertension, resume p.o. medications        Diet: DIET CARDIAC; Low Sodium (2 GM);  Daily Fluid Restriction: 1500 ml  Dietary Nutrition Supplements: Clear Liquid Oral Supplement  Dietary Nutrition Supplements: Wound Healing Oral Supplement  Code Status: DNR-CCA      Vasu Lomax MD

## 2021-04-20 NOTE — PROGRESS NOTES
[facility]   Daily Progress Note      Admit Date:  4/19/2021    Reason for initial consultation: CHF    CC: \"Feeling less short of breath\"    HPI: 1010 Oksana Gibbons is a 78 y.o. patient who presented to the hospital from a nursing home with complaints of worsening shortness of breath. The patient endorses chronic shortness of breath that she assumed was related to her COPD. Earlier this month she was admitted to the hospital with a COPD exacerbation and discharged to a nursing home. She felt that she never really improved from that hospitalization. She denies any acute severe worsening of the dyspnea but just felt that she was not improving. The patient has a poor functional status and states that she is too weak to walk. She endorses chronic lower extremity edema which she feels has worsened. She was in atrial fibrillation with a rapid ventricular response on admission but denies palpitations or associated chest pains. She was started on IV Lasix and does note some mild improvement in the shortness of breath. Interval History:  Pt remains in atrial fibrillation. She has had increasing oxygen requirements. She underwent a chest CT with contrast and there was no pulmonary emboli but did demonstrate large bilateral pleural effusions and lung consolidations. She is currently on positive pressure ventilation and says she is feeling more comfortable. She denies associated chest pains. Her heart rate is currently controlled in the 90s and she denies palpitations. Her lower extremity edema is improving. Review of Systems:   · Constitutional: No unanticipated weight loss. There's been no change in energy level, sleep pattern, or activity level. No fevers, chills. · Eyes: No visual changes or diplopia. No scleral icterus. · ENT: No Headaches, hearing loss or vertigo. No mouth sores or sore throat.   · Cardiovascular: as reviewed in HPI  · Respiratory: No cough or wheezing, no sputum Intravenous BID    buPROPion  150 mg Oral Daily    metoprolol succinate  50 mg Oral Daily    sodium chloride flush  5-40 mL Intravenous 2 times per day    [Held by provider] apixaban  5 mg Oral BID    atorvastatin  10 mg Oral Daily      furosemide (LASIX) 1mg/ml infusion 5 mg/hr (04/20/21 1223)    sodium chloride      dilTIAZem 15 mg/hr (04/20/21 1325)     sodium chloride flush, sodium chloride, promethazine **OR** ondansetron, morphine, acetaminophen **OR** acetaminophen    Lab Data:  CBC:   Recent Labs     04/19/21  0100 04/20/21  0506   WBC 14.3* 16.2*   HGB 9.9* 9.6*    193     BMP:    Recent Labs     04/19/21  0100 04/20/21  0506   * 141   K 4.7 3.1*   CO2 33* 33*   BUN 35* 28*   CREATININE 0.8 0.7     LIVR:   Recent Labs     04/19/21 0100 04/20/21  0506   AST 13* 11*   ALT 28 21     INR:  No results for input(s): INR in the last 72 hours. APTT: No results for input(s): APTT in the last 72 hours. BNP:  No results for input(s): BNP in the last 72 hours. EKG: Personally interpreted. 4/19/2021. Atrial fibrillation with rapid ventricular response. Nonspecific ST-T wave abnormalities.     Echo: 4/19/21  Ejection fraction is visually estimated to be 55-60%. Indeterminate diastolic function. The left atrium is severely dilated. Normal right ventricular size and function. There is a small pericardial effusion noted without hemodynamic significance  There is a large pleural effusion. Estimated pulmonary artery systolic pressure is normal at 37 mmHg assuming a right atrial pressure of 8 mmHg.     Tele: Atrial fibrillation. Rate currently in the 90s    Assessment/Plan:    1) Acute on chronic diastolic heart failure. EF 60%. Appears hypervolemic but hypoalbuminemia may also be contributing to edema. Continue IV Lasix.    2) Atrial fibrillation with rapid ventricular response. Rate currently controlled. Patient seemingly unaware of the arrhythmia.   [id number] score is at least 6 (CHF, age, gender, hypertension, vascular calcifications). Continue attempts at rate control with IV Cardizem and oral metoprolol. Patient agreeable to anticoagulation but currently on hold for possible thoracentesis.    3) Demand ischemia. Will repeat troponin. History is not consistent with acute coronary syndrome. Elevated troponin likely related to tachycardia and CHF in a patient presumably with CAD. No plans for stress testing or angiography with advanced age and limited functional status.    4) Acute on chronic respiratory failure with hypoxia/COPD. Diurese as tolerated and avoid hypoxia. Will defer additional management to primary team and pulmonary. Chest CT reports consolidations of the right middle and lower lobe with secretions but procalcitonin is low.     5) Large bilateral pleural effusions. Pulmonary consulted. Tentatively planning thoracentesis tomorrow. 6) CAD/PAD. Vascular calcifications noted on chest CT. Discontinued aspirin with use of DOAC. Continue statin therapy.    7) Anemia/plasma cell neoplasm/multiple myeloma. Follows with [facility]. Her blood counts were normal when first diagnosed so will make her hematologist aware of her hospitalization. Overall, the problems requiring hospitalization are high in severity.      Electronically signed by Malorie Valadez MD on 4/20/2021 at 2:14 PM

## 2021-04-20 NOTE — PROGRESS NOTES
Patient wanted off the bipap, placed on 14L nasal cannula. Patient unable to tolerate, dropped into the low 80's immediately. Patient placed back on bipap with rapid improvement. Will continue to monitor.

## 2021-04-20 NOTE — FLOWSHEET NOTE
04/20/21 0941   Readmission Assessment   Number of Days since last admission? 1-7 days   Previous Disposition SNF   Who is being Maisha Rider  (Daughter, Gerry Pedro)   What was the patient's/caregiver's perception as to why they think they needed to return back to the hospital?   (Shortness of breath, aspirating)   Did you visit your Primary Care Physician after you left the hospital, before you returned this time? Yes   Did you see a specialist, such as Cardiac, Pulmonary, Orthopedic Physician, etc. after you left the hospital? No   Who advised the patient to return to the hospital? Skilled Unit   Does the patient report anything that got in the way of taking their medications? No   In our efforts to provide the best possible care to you and others like you, can you think of anything that we could have done to help you after you left the hospital the first time, so that you might not have needed to return so soon?    (None)

## 2021-04-20 NOTE — CONSULTS
PATIENT IS SEEN AT THE REQUEST OF DR. Zuly Castillo for worsening respiratory failure    CONSULTING PHYSICIAN: Lesa    HISTORY OF PRESENT ILLNESS:  This is a 78 y.o. female who presented to the ED on 4/19 with a CC of SOB for weeks. Per ED notes she has been SOB for weeks with recent worsening. She normally uses oxygen at baseline. Admitted for Afib with RVR, pneumonia and pleural effusion. She says her breathing has become much worse since yesterday but also having indigestion. She says she does not use oxygen at home and quit tobacco 3 months ago. She quit 3 months ago because she could not breathe anymore. Says she was recently admitted for \"cement in her back. \"  She is SOB currently and coughing up less mucus than yesterday. She was recently admitted and discharged to [facility] with oxygen      Established Pulmonologist:  Saw me in the past     PAST MEDICAL HISTORY:  Past Medical History:   Diagnosis Date    HTN (hypertension)     Hypercholesterolemia     MAC (mycobacterium avium-intracellulare complex)     Osteoarthritis     Osteopetrosis     Papanicolaou smear of cervix with atypical squamous cells of undetermined significance (ASC-US) 1998    Shingles     Stress incontinence        PAST SURGICAL HISTORY:  Past Surgical History:   Procedure Laterality Date    BREAST SURGERY      CATARACT REMOVAL Left 2009    CHOLECYSTECTOMY      CT BONE MARROW BIOPSY  2/12/2021    CT BONE MARROW BIOPSY 2/12/2021 [facility] CT    INGUINAL HERNIA REPAIR Right 2015    SPINE SURGERY N/A 11/25/2020    T7 KYPHOPLASTY WITH BONE BIOPSY performed by Uri Torres MD at [address] [facility] N/A 4/14/2021    T9 KYPHOPLASTY WITH BONE BIOPSY performed by Uri Torres MD at [facility]:  family history includes Cancer in her sister; Coronary Art Dis in her brother and sister; Diabetes in her mother; Stroke in her brother.     SOCIAL HISTORY:   reports that she quit smoking about 3 months ago. Her smoking use included cigarettes. She has a 32.50 pack-year smoking history. She has never used smokeless tobacco.    Scheduled Meds:   potassium chloride  40 mEq Oral Once    buPROPion  150 mg Oral Daily    budesonide-formoterol  2 puff Inhalation BID    metoprolol succinate  50 mg Oral Daily    sodium chloride flush  5-40 mL Intravenous 2 times per day    furosemide  40 mg Intravenous BID    azithromycin  500 mg Intravenous Q24H    apixaban  5 mg Oral BID    ampicillin-sulbactam  1,500 mg Intravenous Q6H    atorvastatin  10 mg Oral Daily       Continuous Infusions:   sodium chloride      dilTIAZem 10 mg/hr (04/20/21 0634)       PRN Meds:  sodium chloride flush, sodium chloride, promethazine **OR** ondansetron, morphine, acetaminophen **OR** acetaminophen    ALLERGIES:  Patient is allergic to adhesive tape; codeine; iodine; and lisinopril. REVIEW OF SYSTEMS:  Constitutional: Tired  HENT: Negative for sore throat  Eyes: Negative for redness   Respiratory: SOB, coughing   Cardiovascular: Negative for chest pain  Gastrointestinal: Indigestion   Genitourinary: Negative for hematuria, negative for dysuria  Musculoskeletal: Back pain   Skin: Negative for rash  Neurological: Negative for syncope  Hematological: Negative for adenopathy  Extremities:  some swelling    PHYSICAL EXAM:  Blood pressure 103/69, pulse 109, temperature 97.3 °F (36.3 °C), resp. rate 26, height 5' 7\" (1.702 m), weight 132 lb 11.5 oz (60.2 kg), SpO2 90 %.'  Gen: Frail, chronically ill   Eyes: PERRL. No sclera icterus. No conjunctival injection. ENT: No discharge. Pharynx clear. Neck: Trachea midline. No obvious mass. Resp: + accessory muscle use. Crackles  CV: Irregular. No murmur or rub. GI: Non-tender. Non-distended. No hernia. BS present. Skin: Warm and dry. No nodule on exposed extremities. Lymph: No cervical LAD. No supraclavicular LAD. M/S: No cyanosis. No joint deformity. Neuro: Awake. Alert.  Moves all four extremities. EXT:   + edema, no clubbing    LABS:  CBC:   Recent Labs     04/19/21  0100 04/20/21  0506   WBC 14.3* 16.2*   HGB 9.9* 9.6*   HCT 30.1* 29.0*   MCV 93.4 93.3    193     BMP:   Recent Labs     04/19/21  0100 04/20/21  0506   * 141   K 4.7 3.1*    99   CO2 33* 33*   BUN 35* 28*   CREATININE 0.8 0.7     LIVER PROFILE:   Recent Labs     04/19/21  0100 04/20/21  0506   AST 13* 11*   ALT 28 21   BILITOT <0.2 <0.2   ALKPHOS 129 115     PT/INR: No results for input(s): PROTIME, INR in the last 72 hours. APTT: No results for input(s): APTT in the last 72 hours. UA:  Recent Labs     04/19/21  0235   COLORU DK YELLOW   PHUR 5.5   WBCUA 1   RBCUA 2   CLARITYU Clear   SPECGRAV 1.024   LEUKOCYTESUR Negative   UROBILINOGEN 0.2   BILIRUBINUR Negative   BLOODU Negative   GLUCOSEU Negative     No results for input(s): PHART, HCJ6NON, PO2ART in the last 72 hours. Cultures:  Negative to date     PFTs:  None       ECHO: 4/19/21  Ejection fraction is visually estimated to be 55-60%. Indeterminate diastolic function. The left atrium is severely dilated. Normal right ventricular size and function. There is a small pericardial effusion noted without hemodynamic significance   There is a large pleural effusion. Estimated pulmonary artery systolic pressure is normal at 37 mmHg assuming a   right atrial pressure of 8 mmHg. ABG:  None    Chest CT:  Chest imaging was reviewed by me and showed bilateral pleural effusions with compressive atelectasis. Right effusion might be loculated. Secretions in the right airway. Hiatal hernia noted     I reviewed all the above labs and studies pertaining to this visit. ASSESSMENT/PLAN:  · Acute Hypoxic Respiratory Failure with saturations less than 90% on room air worsening.   Increasing oxygen requirements coinciding with elevated HR (She says she is not on oxygen at home but was recently discharged on oxygen)  Titrate oxygen for saturations

## 2021-04-20 NOTE — PROGRESS NOTES
INPATIENT PROGRESS NOTE        IDENTIFYING DATA/REASON FOR CONSULTATION   PATIENT:  Gómez Olmedo  MRN:  [id number]  ADMIT DATE: 2021  TIME OF EVALUATION: 2021 10:04 AM  HOSPITAL STAY:   LOS: 1 day   CONSULTING PHYSICIAN: Vasu Arita MD   REASON FOR CONSULTATION: Dysphagia    Subjective:    Patient seen in follow-up. She had increased oxygen requirements overnight along with an elevated heart rate. Seen by pulmonology, considering thoracentesis, they do not recommend an EGD at this time. Naya Myers MEDICATIONS   SCHEDULED:  cefepime, 2,000 mg, Q12H  metroNIDAZOLE, 500 mg, Q8H  ipratropium, 0.5 mg, Q4H WA  buPROPion, 150 mg, Daily  metoprolol succinate, 50 mg, Daily  sodium chloride flush, 5-40 mL, 2 times per day  furosemide, 40 mg, BID  [Held by provider] apixaban, 5 mg, BID  atorvastatin, 10 mg, Daily      FLUIDS/DRIPS:     sodium chloride      dilTIAZem 10 mg/hr (21 0634)     PRNs: sodium chloride flush, 5-40 mL, PRN  sodium chloride, 25 mL, PRN  promethazine, 12.5 mg, Q6H PRN    Or  ondansetron, 4 mg, Q6H PRN  morphine, 2 mg, Q3H PRN  acetaminophen, 650 mg, Q6H PRN    Or  acetaminophen, 650 mg, Q6H PRN      ALLERGIES:    Allergies   Allergen Reactions    Adhesive Tape Other (See Comments)     Can cause irritation when on awhile    Codeine      GI upset    Iodine Hives    Lisinopril      Angioedema           PHYSICAL EXAM   VITALS:  /69   Pulse 109   Temp 97.3 °F (36.3 °C) (Oral)   Resp 20   Ht 5' 7\" (1.702 m)   Wt 132 lb 11.5 oz (60.2 kg)   SpO2 90%   BMI 20.79 kg/m²   TEMPERATURE:  Current - Temp: 97.3 °F (36.3 °C);  Max - Temp  Av.5 °F (36.4 °C)  Min: 97.3 °F (36.3 °C)  Max: 97.9 °F (36.6 °C)    Physical Exam:  General appearance: alert, cooperative, no distress, appears stated age  Eyes: Anicteric  Head: Normocephalic, without obvious abnormality  Lungs: clear to auscultation bilaterally, Normal Effort  Heart: regular rate and rhythm, normal S1 and S2, no murmurs or rubs  Abdomen: soft, non-distended, non-tender. Bowel sounds normal. No masses,  no organomegaly. Extremities: atraumatic, no cyanosis or edema  Skin: warm and dry, no jaundice  Neuro: Grossly intact, A&OX3    LABS AND IMAGING   Laboratory   Recent Labs     04/19/21  0100 04/20/21  0506   WBC 14.3* 16.2*   HGB 9.9* 9.6*   HCT 30.1* 29.0*   MCV 93.4 93.3    193     Recent Labs     04/19/21  0100 04/20/21  0506   * 141   K 4.7 3.1*    99   CO2 33* 33*   BUN 35* 28*   CREATININE 0.8 0.7     Recent Labs     04/19/21  0100 04/20/21  0506   AST 13* 11*   ALT 28 21   BILITOT <0.2 <0.2   ALKPHOS 129 115     No results for input(s): LIPASE, AMYLASE in the last 72 hours. No results for input(s): PROTIME, INR in the last 72 hours. Imaging  VL Extremity Venous Bilateral         CT CHEST PULMONARY EMBOLISM W CONTRAST   Final Result   1. No pulmonary embolus. 2. Large bilateral effusions with opacities in the mid to lower lung zone   which may represent atelectasis or infection. 3. Significant airway attenuation and secretions as described. 4. Moderate hiatal hernia. 5. Severe atherosclerosis. XR CHEST PORTABLE   Final Result   Stable cardiomegaly with slowly resolving pulmonary edema      Hazy perihilar and bibasilar opacities which is slightly less prominent. Small bibasilar pleural effusions which are more apparent         VASCULAR REPORT    (Results Pending)       Endoscopy      ASSESSMENT AND RECOMMENDATIONS   Denilson Cole is a 78 y.o. female with PMH of COPD, tobacco abuse, HTN, CKD who presented 4/19/2021 with S OB. Admitted with A. fib with RVR, pneumonia and pleural effusion. We have been consulted regarding dysphagia    1. Dysphagia  -Esophagram showed a small HH with a possible Schatzki's ring. MBS was normal.   -High risk for EGD at this time due to hypoxia and increased supplemental oxygen requirement  2.  Acute hypoxic respiratory failure   -Currently on 12 L high flow O2  3. Afib with RVR  4. Possible PNA, on Unasyn and Azithromycin  5. Bilateral pleural effusion  -Possible thoracentesis tomorrow    RECOMMENDATIONS:    Holding off on EGD at this time until pulmonary status improves. We will plan to perform as an outpatient. Will need to hold Eliquis prior to procedure. If you have any questions or need any further information, please feel free to contact us [phone]. Thank you for allowing us to participate in the care of [address] [place] [address]. The note was completed using Dragon voice recognition transcription. Every effort was made to ensure accuracy; however, inadvertent transcription errors may be present despite my best efforts to edit errors. Tony MOTA    Attending physician addendum:    I have personally seen and examined the patient, reviewed the patient's medical record and pertinent labs and clinical imaging. I have personally staffed the case with Tony MOTA. I agree with her consultation note, exam findings, assessment and plans  as written above. I have made appropriate modifications and edited her assessment and plan where needed to reflect my impression and plans for this patient. Scarlet Marques is a 77 YO  female with a PMH of Asthma, Hypertension, Tobacco abuse, COPD, CKD, hyperlipidemia, OA, irritable bowel syndrome, stress incontinence, osteoporosis who presented 4/19/2021 with SOB. We have been consulted regarding dysphagia. Dysphagia-Esophagram showed a Schatzki's Ring which is the likely cause of intermittent dysphagia. Patient has had some worsening of respiratory status overnight. Will hold off on procedures at this point. If inpatient EGD is desired prior to discharge please reconsult. Thank you for allowing me to participate in this patient's care. If there are any questions or concerns regarding this patient, or the plan we have set in place, please feel free to contact me at [phone].      Dana Morris Mamta Pena MD

## 2021-04-21 ENCOUNTER — APPOINTMENT (OUTPATIENT)
Dept: GENERAL RADIOLOGY | Age: 81
DRG: 177 | End: 2021-04-21
Payer: MEDICARE

## 2021-04-21 ENCOUNTER — APPOINTMENT (OUTPATIENT)
Dept: ULTRASOUND IMAGING | Age: 81
DRG: 177 | End: 2021-04-21
Payer: MEDICARE

## 2021-04-21 VITALS
TEMPERATURE: 96 F | WEIGHT: 123.46 LBS | SYSTOLIC BLOOD PRESSURE: 80 MMHG | BODY MASS INDEX: 19.38 KG/M2 | OXYGEN SATURATION: 100 % | DIASTOLIC BLOOD PRESSURE: 56 MMHG | HEART RATE: 102 BPM | RESPIRATION RATE: 16 BRPM | HEIGHT: 67 IN

## 2021-04-21 LAB
ABO/RH: NORMAL
ABO/RH: NORMAL
ANION GAP SERPL CALCULATED.3IONS-SCNC: 14 MMOL/L (ref 3–16)
ANION GAP SERPL CALCULATED.3IONS-SCNC: 6 MMOL/L (ref 3–16)
ANTIBODY SCREEN: NORMAL
BILIRUBIN URINE: NEGATIVE
BLOOD BANK DISPENSE STATUS: NORMAL
BLOOD BANK PRODUCT CODE: NORMAL
BLOOD, URINE: NEGATIVE
BPU ID: NORMAL
BUN BLDV-MCNC: 41 MG/DL (ref 7–20)
BUN BLDV-MCNC: 47 MG/DL (ref 7–20)
CALCIUM SERPL-MCNC: 6.6 MG/DL (ref 8.3–10.6)
CALCIUM SERPL-MCNC: 6.7 MG/DL (ref 8.3–10.6)
CHLORIDE BLD-SCNC: 98 MMOL/L (ref 99–110)
CHLORIDE BLD-SCNC: 98 MMOL/L (ref 99–110)
CLARITY: ABNORMAL
CO2: 30 MMOL/L (ref 21–32)
CO2: 38 MMOL/L (ref 21–32)
COLOR: YELLOW
COMMENT UA: ABNORMAL
CREAT SERPL-MCNC: 0.7 MG/DL (ref 0.6–1.2)
CREAT SERPL-MCNC: 0.9 MG/DL (ref 0.6–1.2)
DESCRIPTION BLOOD BANK: NORMAL
EPITHELIAL CELLS, UA: 9 /HPF (ref 0–5)
GFR AFRICAN AMERICAN: >60
GFR AFRICAN AMERICAN: >60
GFR NON-AFRICAN AMERICAN: 60
GFR NON-AFRICAN AMERICAN: >60
GLUCOSE BLD-MCNC: 162 MG/DL (ref 70–99)
GLUCOSE BLD-MCNC: 213 MG/DL (ref 70–99)
GLUCOSE URINE: NEGATIVE MG/DL
HCT VFR BLD CALC: 13.6 % (ref 36–48)
HEMOGLOBIN: 4.5 G/DL (ref 12–16)
KETONES, URINE: NEGATIVE MG/DL
LEUKOCYTE ESTERASE, URINE: NEGATIVE
MAGNESIUM: 1.7 MG/DL (ref 1.8–2.4)
MICROSCOPIC EXAMINATION: YES
NITRITE, URINE: NEGATIVE
PH UA: 5 (ref 5–8)
POTASSIUM SERPL-SCNC: 3.8 MMOL/L (ref 3.5–5.1)
POTASSIUM SERPL-SCNC: 4 MMOL/L (ref 3.5–5.1)
PROTEIN UA: NEGATIVE MG/DL
RBC UA: ABNORMAL /HPF (ref 0–4)
SODIUM BLD-SCNC: 142 MMOL/L (ref 136–145)
SODIUM BLD-SCNC: 142 MMOL/L (ref 136–145)
SPECIFIC GRAVITY UA: 1.01 (ref 1–1.03)
URINE TYPE: ABNORMAL
UROBILINOGEN, URINE: 0.2 E.U./DL
WBC UA: 1 /HPF (ref 0–5)
YEAST: PRESENT /HPF

## 2021-04-21 PROCEDURE — 71045 X-RAY EXAM CHEST 1 VIEW: CPT

## 2021-04-21 PROCEDURE — 86923 COMPATIBILITY TEST ELECTRIC: CPT

## 2021-04-21 PROCEDURE — P9047 ALBUMIN (HUMAN), 25%, 50ML: HCPCS | Performed by: INTERNAL MEDICINE

## 2021-04-21 PROCEDURE — 2700000000 HC OXYGEN THERAPY PER DAY

## 2021-04-21 PROCEDURE — 94660 CPAP INITIATION&MGMT: CPT

## 2021-04-21 PROCEDURE — 36430 TRANSFUSION BLD/BLD COMPNT: CPT

## 2021-04-21 PROCEDURE — 80048 BASIC METABOLIC PNL TOTAL CA: CPT

## 2021-04-21 PROCEDURE — 2500000003 HC RX 250 WO HCPCS: Performed by: INTERNAL MEDICINE

## 2021-04-21 PROCEDURE — 6360000002 HC RX W HCPCS: Performed by: INTERNAL MEDICINE

## 2021-04-21 PROCEDURE — 85014 HEMATOCRIT: CPT

## 2021-04-21 PROCEDURE — 2580000003 HC RX 258: Performed by: INTERNAL MEDICINE

## 2021-04-21 PROCEDURE — 81001 URINALYSIS AUTO W/SCOPE: CPT

## 2021-04-21 PROCEDURE — 86850 RBC ANTIBODY SCREEN: CPT

## 2021-04-21 PROCEDURE — 99233 SBSQ HOSP IP/OBS HIGH 50: CPT | Performed by: NURSE PRACTITIONER

## 2021-04-21 PROCEDURE — 85018 HEMOGLOBIN: CPT

## 2021-04-21 PROCEDURE — P9016 RBC LEUKOCYTES REDUCED: HCPCS

## 2021-04-21 PROCEDURE — 6370000000 HC RX 637 (ALT 250 FOR IP): Performed by: CLINICAL NURSE SPECIALIST

## 2021-04-21 PROCEDURE — 94640 AIRWAY INHALATION TREATMENT: CPT

## 2021-04-21 PROCEDURE — 83735 ASSAY OF MAGNESIUM: CPT

## 2021-04-21 PROCEDURE — 94761 N-INVAS EAR/PLS OXIMETRY MLT: CPT

## 2021-04-21 PROCEDURE — C9113 INJ PANTOPRAZOLE SODIUM, VIA: HCPCS | Performed by: INTERNAL MEDICINE

## 2021-04-21 PROCEDURE — 99291 CRITICAL CARE FIRST HOUR: CPT | Performed by: INTERNAL MEDICINE

## 2021-04-21 PROCEDURE — 86900 BLOOD TYPING SEROLOGIC ABO: CPT

## 2021-04-21 PROCEDURE — 86901 BLOOD TYPING SEROLOGIC RH(D): CPT

## 2021-04-21 PROCEDURE — 6360000002 HC RX W HCPCS: Performed by: NURSE PRACTITIONER

## 2021-04-21 PROCEDURE — 6360000002 HC RX W HCPCS: Performed by: CLINICAL NURSE SPECIALIST

## 2021-04-21 PROCEDURE — 36415 COLL VENOUS BLD VENIPUNCTURE: CPT

## 2021-04-21 RX ORDER — LORAZEPAM 2 MG/ML
1 CONCENTRATE ORAL EVERY 6 HOURS PRN
Status: DISCONTINUED | OUTPATIENT
Start: 2021-04-21 | End: 2021-04-21 | Stop reason: HOSPADM

## 2021-04-21 RX ORDER — MORPHINE SULFATE 10 MG/.5ML
5 SOLUTION ORAL ONCE
Status: COMPLETED | OUTPATIENT
Start: 2021-04-21 | End: 2021-04-21

## 2021-04-21 RX ORDER — MAGNESIUM SULFATE 1 G/100ML
1000 INJECTION INTRAVENOUS ONCE
Status: DISCONTINUED | OUTPATIENT
Start: 2021-04-21 | End: 2021-04-21

## 2021-04-21 RX ORDER — 0.9 % SODIUM CHLORIDE 0.9 %
250 INTRAVENOUS SOLUTION INTRAVENOUS ONCE
Status: COMPLETED | OUTPATIENT
Start: 2021-04-21 | End: 2021-04-21

## 2021-04-21 RX ORDER — SODIUM CHLORIDE 9 MG/ML
INJECTION, SOLUTION INTRAVENOUS PRN
Status: DISCONTINUED | OUTPATIENT
Start: 2021-04-21 | End: 2021-04-21 | Stop reason: HOSPADM

## 2021-04-21 RX ORDER — MORPHINE SULFATE 4 MG/ML
4 INJECTION, SOLUTION INTRAMUSCULAR; INTRAVENOUS
Status: DISCONTINUED | OUTPATIENT
Start: 2021-04-21 | End: 2021-04-21 | Stop reason: HOSPADM

## 2021-04-21 RX ORDER — DIGOXIN 0.25 MG/ML
250 INJECTION INTRAMUSCULAR; INTRAVENOUS EVERY 4 HOURS
Status: DISPENSED | OUTPATIENT
Start: 2021-04-21 | End: 2021-04-21

## 2021-04-21 RX ORDER — SODIUM CHLORIDE 9 MG/ML
INJECTION, SOLUTION INTRAVENOUS PRN
Status: DISCONTINUED | OUTPATIENT
Start: 2021-04-21 | End: 2021-04-21 | Stop reason: SDUPTHER

## 2021-04-21 RX ORDER — LORAZEPAM 2 MG/ML
2 CONCENTRATE ORAL ONCE
Status: COMPLETED | OUTPATIENT
Start: 2021-04-21 | End: 2021-04-21

## 2021-04-21 RX ORDER — SODIUM CHLORIDE 9 MG/ML
10 INJECTION INTRAVENOUS 2 TIMES DAILY
Status: DISCONTINUED | OUTPATIENT
Start: 2021-04-21 | End: 2021-04-21

## 2021-04-21 RX ORDER — PANTOPRAZOLE SODIUM 40 MG/10ML
40 INJECTION, POWDER, LYOPHILIZED, FOR SOLUTION INTRAVENOUS 2 TIMES DAILY
Status: DISCONTINUED | OUTPATIENT
Start: 2021-04-21 | End: 2021-04-21

## 2021-04-21 RX ORDER — PANTOPRAZOLE SODIUM 40 MG/1
40 TABLET, DELAYED RELEASE ORAL
Qty: 60 TABLET | Refills: 0 | Status: SHIPPED | OUTPATIENT
Start: 2021-04-21

## 2021-04-21 RX ADMIN — SODIUM CHLORIDE 8 MG/HR: 9 INJECTION, SOLUTION INTRAVENOUS at 10:30

## 2021-04-21 RX ADMIN — MORPHINE SULFATE 2 MG: 2 INJECTION, SOLUTION INTRAMUSCULAR; INTRAVENOUS at 13:25

## 2021-04-21 RX ADMIN — CEFEPIME HYDROCHLORIDE 2000 MG: 2 INJECTION, POWDER, FOR SOLUTION INTRAVENOUS at 11:11

## 2021-04-21 RX ADMIN — SODIUM CHLORIDE 250 ML: 9 INJECTION, SOLUTION INTRAVENOUS at 10:02

## 2021-04-21 RX ADMIN — MORPHINE SULFATE 4 MG: 4 INJECTION, SOLUTION INTRAMUSCULAR; INTRAVENOUS at 14:32

## 2021-04-21 RX ADMIN — Medication 2 MG: at 19:21

## 2021-04-21 RX ADMIN — FUROSEMIDE 5 MG/HR: 10 INJECTION, SOLUTION INTRAMUSCULAR; INTRAVENOUS at 01:20

## 2021-04-21 RX ADMIN — CALCIUM CHLORIDE 1000 MG: 100 INJECTION, SOLUTION INTRAVENOUS at 11:04

## 2021-04-21 RX ADMIN — DIGOXIN 250 MCG: 0.25 INJECTION INTRAMUSCULAR; INTRAVENOUS at 10:13

## 2021-04-21 RX ADMIN — MORPHINE SULFATE 2 MG: 2 INJECTION, SOLUTION INTRAMUSCULAR; INTRAVENOUS at 04:59

## 2021-04-21 RX ADMIN — IPRATROPIUM BROMIDE 0.5 MG: 0.5 SOLUTION RESPIRATORY (INHALATION) at 08:04

## 2021-04-21 RX ADMIN — IPRATROPIUM BROMIDE 0.5 MG: 0.5 SOLUTION RESPIRATORY (INHALATION) at 11:47

## 2021-04-21 RX ADMIN — IPRATROPIUM BROMIDE 0.5 MG: 0.5 SOLUTION RESPIRATORY (INHALATION) at 16:01

## 2021-04-21 RX ADMIN — ALBUMIN (HUMAN) 25 G: 0.25 INJECTION, SOLUTION INTRAVENOUS at 05:10

## 2021-04-21 RX ADMIN — METRONIDAZOLE 500 MG: 500 INJECTION, SOLUTION INTRAVENOUS at 01:20

## 2021-04-21 RX ADMIN — ONDANSETRON 4 MG: 2 INJECTION INTRAMUSCULAR; INTRAVENOUS at 10:33

## 2021-04-21 RX ADMIN — MORPHINE SULFATE 5 MG: 100 SOLUTION ORAL at 19:22

## 2021-04-21 ASSESSMENT — PAIN DESCRIPTION - DESCRIPTORS: DESCRIPTORS: ACHING

## 2021-04-21 ASSESSMENT — PAIN SCALES - GENERAL
PAINLEVEL_OUTOF10: 2
PAINLEVEL_OUTOF10: 6
PAINLEVEL_OUTOF10: 6
PAINLEVEL_OUTOF10: 4
PAINLEVEL_OUTOF10: 0

## 2021-04-21 ASSESSMENT — PAIN DESCRIPTION - FREQUENCY: FREQUENCY: INTERMITTENT

## 2021-04-21 ASSESSMENT — PAIN DESCRIPTION - ONSET: ONSET: ON-GOING

## 2021-04-21 ASSESSMENT — PAIN - FUNCTIONAL ASSESSMENT: PAIN_FUNCTIONAL_ASSESSMENT: PREVENTS OR INTERFERES SOME ACTIVE ACTIVITIES AND ADLS

## 2021-04-21 NOTE — PROGRESS NOTES
Transport here to  patient at this time. Daughter at bedside and going with her.  Packet given to them and pt going with kaylene and 3 ivs.

## 2021-04-21 NOTE — PROGRESS NOTES
Pt with a couple family members still at bedside. Her bloody stool has slowed down a lot. She has been resting pretty well the last 2 hours and is on nonrebreather at this time and tolerating fine.

## 2021-04-21 NOTE — CONSULTS
Oncology Consult    See dictation    A/P:  1. History of smoldering myeloma/compression fracture. Dr. Pepper Lopez did a bone biopsy at the time of the compression fracture and there was no signs of myeloma. The compression fracture was due to osteoporosis. I will recheck her myeloma parameters. She has low level disease with no obvious signs of endorgan damage other than anemia. Her anemia could be due to her recent acute kidney injury and not the myeloma. I will hold off on initiating treatment. I will give a dose of Procrit. I explained the potential side effects. She is willing to proceed. 2.  Shortness of breath. This is partially due to A. fib and partially due to her effusions. She is having a thoracentesis today. Thank you for the consultation. I will follow closely.     Vanessa Benitez MD

## 2021-04-21 NOTE — CONSULTS
PALLIATIVE MEDICINE CONSULTATION     Patient name:[facility]   DESEAN:[id number]    UQT:5/69/8406  Room/Bed:F3M-5493/5271-01   LOS: 2 days         Date of consult:4/21/2021    Consult Information  Palliative Medicine Consult performed by: Sylvester Kirby CNP  Requested by: Dr Vaishnavi Rodriguez  Reason for consult: [address], code status    Number of admissions past 12 months: 4    ASSESSMENT/RECOMMENDATIONS     78 y.o. female with Hypoxia and abd pain       Symptom Management:  1. Hypoxia- Pt on bipap and in resp distress pt able to express she doesn't want intubation and aked me to call her daughter Deric Rizvi  2. Abd pain- r/t acute GI bleed Morphine ordered and pt receiving blood. 3. Goals of Care- pt states that she is ready to die and doesn't want aggressive care. Code changed to [facility] and after talking to daughter Hospice consulted. Patient/Family Goals of Care :    Talked to patient and daughter Deric Rizvi both are in agreement that pt wants comfort care only and wants to let nature take its course. Informed them both with suspected active GI bleed her life expectancy is hours to possibly a week at outside. Pt and family understand and are tearful but accepting. Daughter wants pt transferred to Hospice inside [facility] if possible. Disposition/Discharge Plan:   Plan to transfer to [address] [place] [address] [address] if pt is stable enough for transport. Advance Directives:  · Surrogate Decision Maker: Rosario-daughter  · Code status:  DNR-CC    Case discussed with: patient, floor RN, Timur Kasper, Dr Vaishnavi Rodriguez  Thank you for allowing us to participate in the care of this patient. HISTORY     CC: Abd pain, hypoxia  HPI: The patient is a 78 y.o. female with History of COPD, hypertension hyperlipidemia, myeloma. She uses 2 L of oxygen via nasal cannula at the nursing home. She had T9 kyphoplasty this month.     Palliative Medicine SymptomScreening/ROS:  Review of Systems - History obtained from chart review and unobtainable from patient due to resp ditress  Gastrointestinal ROS: positive for - abdominal pain and blood in stools    Patient unable to complete full ROS due to current cognitive status. Information that is obtained from nursing and chart.      Pain:    Home med list and hospital medications reviewed in chart as of 4/21/2021     EXAM     Vitals:    04/21/21 1350   BP: 88/60   Pulse: 104   Resp: (!) 32   Temp:    SpO2: 96%       Physical Examination: General appearance - in mild to moderate distress and ill-appearing  Mental status - alert, oriented to person, place, and time, anxious  Neck - supple, no significant adenopathy  Chest - wheezing noted , rhonchi noted   Abdomen - Crampping, abd pain  Musculoskeletal - full range of motion without pain  Skin - scattered ecyhmotic areas       Current labs in the epic chart reviewed as of 4/21/2021   Review of previous notes, admits, labs, radiology and testing relevant to this consult done in this chart today 4/21/2021    Signed By: Electronically signed by LAYSIA Ballesteros CNP on 4/21/2021 at 2:51 PM  Palliative Medicine   [id number]    April 21, 2021

## 2021-04-21 NOTE — PROGRESS NOTES
Pt signed consent for blood and blood ready. Verified with blood bank and tubing up.  Family [facility] meeting at [address] [facility].

## 2021-04-21 NOTE — PLAN OF CARE
Problem: Falls - Risk of:  Goal: Will remain free from falls  Description: Will remain free from falls  4/21/2021 0847 by Axel Lucia RN  Outcome: Ongoing     Problem: Falls - Risk of:  Goal: Absence of physical injury  Description: Absence of physical injury  4/21/2021 0847 by Axel Lucia RN  Outcome: Ongoing     Problem: Skin Integrity:  Goal: Will show no infection signs and symptoms  Description: Will show no infection signs and symptoms  4/21/2021 0847 by Axel Lucia RN  Outcome: Ongoing     Problem: Skin Integrity:  Goal: Absence of new skin breakdown  Description: Absence of new skin breakdown  4/21/2021 0847 by Axel Lucia RN  Outcome: Ongoing     Problem: Nutrition  Goal: Optimal nutrition therapy  4/21/2021 0847 by Axel Lucia RN  Outcome: Ongoing     Problem: OXYGENATION/RESPIRATORY FUNCTION  Goal: Patient will maintain patent airway  4/21/2021 0847 by Axel Lucia RN  Outcome: Ongoing     Problem: OXYGENATION/RESPIRATORY FUNCTION  Goal: Patient will achieve/maintain normal respiratory rate/effort  Description: Respiratory rate and effort will be within normal limits for the patient  4/21/2021 0847 by Axel Lucia RN  Outcome: Ongoing     Problem: HEMODYNAMIC STATUS  Goal: Patient has stable vital signs and fluid balance  4/21/2021 0847 by Axel Lucia RN  Outcome: Ongoing     Problem: FLUID AND ELECTROLYTE IMBALANCE  Goal: Fluid and electrolyte balance are achieved/maintained  4/21/2021 0847 by Axel Lucia RN  Outcome: Ongoing     Problem: Discharge Planning:  Goal: Discharged to appropriate level of care  Description: Discharged to appropriate level of care  4/21/2021 0847 by Axel Lucia RN  Outcome: Ongoing     Problem: Discharge Planning:  Goal: Participates in care planning  Description: Participates in care planning  4/21/2021 0847 by Axel Lucia RN  Outcome: Ongoing

## 2021-04-21 NOTE — PROGRESS NOTES
Extremities: 2+ bilateral pedal edema; 1+ ankle and lower pretibial edema. 2+ bilateral radial and 1+ bilateral DP/PT pulses. Cap refill brisk. Medications:    epoetin rayshawn-epbx  40,000 Units Subcutaneous Once    pantoprazole  40 mg Intravenous BID    And    sodium chloride (PF)  10 mL Intravenous BID    pantoprazole (PROTONIX) bolus  80 mg Intravenous Once    cefepime  2,000 mg Intravenous Q12H    metroNIDAZOLE  500 mg Intravenous Q8H    ipratropium  0.5 mg Nebulization Q4H WA    albumin human  25 g Intravenous BID    potassium chloride  40 mEq Oral Once    buPROPion  150 mg Oral Daily    metoprolol succinate  50 mg Oral Daily    sodium chloride flush  5-40 mL Intravenous 2 times per day    [Held by provider] apixaban  5 mg Oral BID    atorvastatin  10 mg Oral Daily      pantoprozole (PROTONIX) infusion      furosemide (LASIX) 1mg/ml infusion 5 mg/hr (04/21/21 0120)    sodium chloride      dilTIAZem 10 mg/hr (04/20/21 2301)     sodium chloride flush, sodium chloride, promethazine **OR** ondansetron, morphine, acetaminophen **OR** acetaminophen    Lab Data:  CBC:   Recent Labs     04/19/21  0100 04/20/21  0506   WBC 14.3* 16.2*   HGB 9.9* 9.6*    193     BMP:    Recent Labs     04/19/21  0100 04/20/21  0506 04/21/21  0040   * 141 142   K 4.7 3.1* 4.0   CO2 33* 33* 38*   BUN 35* 28* 41*   CREATININE 0.8 0.7 0.7     LIVR:   Recent Labs     04/19/21  0100 04/20/21  0506   AST 13* 11*   ALT 28 21     Results for Juli Cope (MRN [id number]) as of 4/21/2021 09:57   Ref. Range 4/19/2021 01:00 4/19/2021 09:23   Pro-BNP Latest Ref Range: 0 - 449 pg/mL 9,563 (H)    Troponin Latest Ref Range: <0.01 ng/mL 0.02 (H) <0.01       Magnesium level: 1.7     CXR: pending    Echo 4/19/2021:   Ejection fraction is visually estimated to be 55-60%. Indeterminate diastolic function. The left atrium is severely dilated. Normal right ventricular size and function.    There is a small pericardial effusion noted without hemodynamic significance   There is a large pleural effusion. Estimated pulmonary artery systolic pressure is normal at 37 mmHg assuming a   right atrial pressure of 8 mmHg. 4/19/2021 CTPA:  1. No pulmonary embolus. 2. Large bilateral effusions with opacities in the mid to lower lung zone   which may represent atelectasis or infection. 3. Significant airway attenuation and secretions as described. 4. Moderate hiatal hernia. 5. Severe atherosclerosis. ECG:  Atrial fib with RVR (146 bpm); nonspecific ST-TW abnormalities      Telemetry: Atrial fib with rapid VR (120-140's)    Assessment/Plan:    1. Acute on chronic diastolic HF  -LVEF 33-73%  -remains hypervolemic, although improving   -remains on IV lasix  -continue BB    2. Atrial fibrillation with RVR  -increased HR this AM  -given hypotension, will dc IV diltiazem and give IV digoxin  -IV fluid bolus  -IV magnesium replacement  -continue Toprol  -[id number] score 6 (CHF, age, gender, HTN, vascular calcifications)  -anticoagulation on hold d/t thoracentesis; hematochezia    3. Large bilateral pleural effusion  -thoracentesis later today in IR  -anticoagulation remains on hold    4. Demand ischemia  -initial troponin elevated slightly  -secondary to rapid A-fib and CHF  -continue medical management of presumed CAD; no plans for stress testing or angiography given advanced age and limited functional status    5. CAD  -coronary calcifications noted on chest CT  -continue statin and BB  -no ASA given DOAC    6. Acute on chronic respiratory failure with hypoxia  -underlying COPD  -continue diuresis  -Rx per pulmonary    7. Anemia  -seen by Hem-Onc    8.  Hypotension  -IV fluid bolus  -repeat H/H pending  -dc diltiazem    Discussed with pulmonary and hospitalist      Electronically signed by ALYSIA Ferrer - CNP on 4/21/2021 at 9:38 AM

## 2021-04-21 NOTE — CONSULTS
GASTROENTEROLOGY INPATIENT CONSULTATION        IDENTIFYING DATA/REASON FOR CONSULTATION   PATIENT:  Shawn Morgan  MRN:  [id number]  ADMIT DATE: 4/19/2021  TIME OF EVALUATION: 4/21/2021 12:29 PM  HOSPITAL STAY:   LOS: 2 days     REASON FOR CONSULTATION:  Bloody BM    HISTORY OF PRESENT ILLNESS   Rufina Robles is a 78 y.o. female with a PMH of asthma, Hypertension, Tobacco abuse, COPD, CKD, hyperlipidemia, OA, irritable bowel syndrome, stress incontinence, osteoporosis  who presented on 4/19/2021 with SOB. We have been consulted regarding a bloody BM. Pt was on BIPAP when I arrived and was unable to provide a good history. Per RN at bedside, pt had a very large dark and bloody bowel movement followed by filling a toilet bowl with dark blood as well. Her hgb dropped to 4.5. BUN 47. She was recently started on Eliquis for Afib, which was put on hold.      Prior Endoscopic Evaluations: none found     PAST MEDICAL, SURGICAL, FAMILY, and SOCIAL HISTORY     Past Medical History:   Diagnosis Date    HTN (hypertension)     Hypercholesterolemia     MAC (mycobacterium avium-intracellulare complex)     Osteoarthritis     Osteopetrosis     Papanicolaou smear of cervix with atypical squamous cells of undetermined significance (ASC-US) 1998    Shingles     Stress incontinence      Past Surgical History:   Procedure Laterality Date    BREAST SURGERY      CATARACT REMOVAL Left 2009    CHOLECYSTECTOMY      CT BONE MARROW BIOPSY  2/12/2021    CT BONE MARROW BIOPSY 2/12/2021 [facility] CT    INGUINAL HERNIA REPAIR Right 2015    SPINE SURGERY N/A 11/25/2020    T7 KYPHOPLASTY WITH BONE BIOPSY performed by Kashmir Trpip [facility] at [facility] 59 N/A 4/14/2021    T9 KYPHOPLASTY WITH BONE BIOPSY performed by Kashmir Tripp MD at [facility] History   Problem Relation Age of Onset    Diabetes Mother     Cancer Sister     Coronary Art Dis Sister     Coronary Art Dis Brother     Stroke Brother Social History     Socioeconomic History    Marital status:      Spouse name: None    Number of children: None    Years of education: None    Highest education level: None   Occupational History    None   Social Needs    Financial resource strain: None    Food insecurity     Worry: None     Inability: None    Transportation needs     Medical: None     Non-medical: None   Tobacco Use    Smoking status: Former Smoker     Packs/day: 0.50     Years: 65.00     Pack years: 32.50     Types: Cigarettes     Quit date: 10/6/2020     Years since quittin.5    Smokeless tobacco: Never Used    Tobacco comment: Advised to quit repeatedly, not interested   Substance and Sexual Activity    Alcohol use: Yes     Comment: rare    Drug use: No    Sexual activity: Never   Lifestyle    Physical activity     Days per week: None     Minutes per session: None    Stress: None   Relationships    Social connections     Talks on phone: None     Gets together: None     Attends [employer] service: None     Active member of club or organization: None     Attends meetings of clubs or organizations: None     Relationship status: None    Intimate partner violence     Fear of current or ex partner: None     Emotionally abused: None     Physically abused: None     Forced sexual activity: None   Other Topics Concern    None   Social History Narrative    Self-breast exams: yes    Exercise: housework 3-4x/wk    Seatbelt use: Always    Living will: no,   additional information provided    Sexual activity: none         MEDICATIONS   SCHEDULED:  epoetin rayshawn-epbx, 40,000 Units, Once  digoxin, 250 mcg, Q4H  ipratropium, 0.5 mg, Q4H WA  buPROPion, 150 mg, Daily  sodium chloride flush, 5-40 mL, 2 times per day      FLUIDS/DRIPS:     pantoprozole (PROTONIX) infusion 8 mg/hr (21 1030)    sodium chloride      sodium chloride      sodium chloride       PRNs: sodium chloride, , PRN  sodium chloride, , PRN  LORazepam, 1 mg, Q6H PRN  sodium chloride flush, 5-40 mL, PRN  sodium chloride, 25 mL, PRN  promethazine, 12.5 mg, Q6H PRN    Or  ondansetron, 4 mg, Q6H PRN  morphine, 2 mg, Q3H PRN  acetaminophen, 650 mg, Q6H PRN    Or  acetaminophen, 650 mg, Q6H PRN      ALLERGIES:  She   Allergies   Allergen Reactions    Adhesive Tape Other (See Comments)     Can cause irritation when on awhile    Codeine      GI upset    Iodine Hives    Lisinopril      Angioedema         REVIEW OF SYSTEMS   Pertinent ROS noted in HPI    PHYSICAL EXAM     Vitals:    04/21/21 1027 04/21/21 1115 04/21/21 1147 04/21/21 1216   BP: 94/65 (!) 78/41  (!) 89/55   Pulse: 112 105  112   Resp:   30 20   Temp:    95.6 °F (35.3 °C)   TempSrc:    Rectal   SpO2:   98% 99%   Weight:       Height:           I/O last 3 completed shifts: In: 100 [P.O.:100]  Out: 3750 [Urine:3750]      Physical Exam:  General appearance: alert, on BIPAP, chronically ill appearing on BIPAP  Eyes: Anicteric  Head: Normocephalic, without obvious abnormality  Lungs: increased work of breathing, poor air movement  Heart: Tachy irregular  Abdomen: soft, non-distended, non-tender. Bowel sounds normal. No masses,  no organomegaly. Extremities: atraumatic, no cyanosis or edema  Skin: warm and dry, no jaundice  Neuro: Obtunded      LABS AND IMAGING   Laboratory   Recent Labs     04/19/21  0100 04/20/21  0506 04/21/21  1006   WBC 14.3* 16.2*  --    HGB 9.9* 9.6* 4.5*   HCT 30.1* 29.0* 13.6*   MCV 93.4 93.3  --     193  --      Recent Labs     04/20/21  0506 04/21/21  0040 04/21/21  1006    142 142   K 3.1* 4.0 3.8   CL 99 98* 98*   CO2 33* 38* 30   BUN 28* 41* 47*   CREATININE 0.7 0.7 0.9     Recent Labs     04/19/21  0100 04/20/21  0506   AST 13* 11*   ALT 28 21   BILITOT <0.2 <0.2   ALKPHOS 129 115     No results for input(s): LIPASE, AMYLASE in the last 72 hours. No results for input(s): PROTIME, INR in the last 72 hours. Imaging  XR CHEST PORTABLE   Final Result   1.  Suspected small left apical pneumothorax. 2. Stable to decreased pleural fluid on the left with persistent small fluid   at the base. 3. Pulmonary vascular congestion. Findings were discussed with Tj Espinoza at 10:08 am on   4/21/2021. VL Extremity Venous Bilateral   Final Result      CT CHEST PULMONARY EMBOLISM W CONTRAST   Final Result   1. No pulmonary embolus. 2. Large bilateral effusions with opacities in the mid to lower lung zone   which may represent atelectasis or infection. 3. Significant airway attenuation and secretions as described. 4. Moderate hiatal hernia. 5. Severe atherosclerosis. XR CHEST PORTABLE   Final Result   Stable cardiomegaly with slowly resolving pulmonary edema      Hazy perihilar and bibasilar opacities which is slightly less prominent. Small bibasilar pleural effusions which are more apparent         VASCULAR REPORT    (Results Pending)         ASSESSMENT AND RECOMMENDATIONS   Carlos Snell is a 78 y.o. female with a PMH of asthma, Hypertension, Tobacco abuse, COPD, CKD, hyperlipidemia, OA, irritable bowel syndrome, stress incontinence, osteoporosis  who presented on 4/19/2021 with SOB. We have been consulted regarding a bloody BM. IMPRESSION:    1. Melena   -Palliative care was at bedside. She discussed goals of care with pt and called daughter to discuss further. Per daughter, pt will transition to hospice for comfort care. Daughter also stated that she does not wish to pursue further evaluation for the bleeding.   -Hgb 4.5, pt was awaiting to receive 2U of PRBCs. 2. Acute hypoxic respiratory failure on BIPAP  3. Hypotension  4. Emphysema  5. Afib with RVR, Eliquis on hold  6. Bilateral pleural effusion    RECOMMENDATIONS:    Pt is not stable enough for any endoscopic evaluation. Per Palliative care, the goals of care were discussed with the pt and daughter at length and pt will be transitioned to hospice for comfort care.  No further GI workup will be done at this time. Continue with supportive care. If you have any questions or need any further information, please feel free to contact our consult team.  Thank you for allowing us to participate in the care of [address] [place] [address]. The note was completed using Dragon voice recognition transcription. Every effort was made to ensure accuracy; however, inadvertent transcription errors may be present despite my best efforts to edit errors. Natalio Skaggs PA-C    Attending physician addendum:    I have personally seen and examined the patient, reviewed the patient's medical record and pertinent labs and clinical imaging. I have personally staffed the case with Steven MOTA. I agree with her consultation note, exam findings, assessment and plans  as written above. I have made appropriate modifications and edited her assessment and plan where needed to reflect my impression and plans for this patient. Bradley Mendoza is a 79 YO  female with a PMH of Asthma, Hypertension, Tobacco abuse, COPD, CKD, hyperlipidemia, OA, irritable bowel syndrome, stress incontinence, osteoporosis who presented 4/19/2021 with SOB. We have been consulted again today due to new GI bleed. Patient currently not stable to endoscopic evaluation. Family/Patient have elected for palliative care and hospice care. Will sign off. Thank you for allowing me to participate in this patient's care. If there are any questions or concerns regarding this patient, or the plan we have set in place, please feel free to contact me at [phone].      Stefan Rossi MD

## 2021-04-21 NOTE — PROGRESS NOTES
Thoracentesis at bedside unsuccessful. Patient couldn't get into the right position and was very uncomfortable with it. Once layed pt down, said it was hard to breathe. o2 sats 97-99% on bipap. Pts heart rate increased and bp dropped. New orders for meds. Stat cxr ordered and dr Obed Adams still at bedside. Pt prior to bedside procedure had a VERY LARGE dark bloody bowel movement. Pt c/o of belly cramping and aching as well. Protonix ordered and will give zofran. Pt repositioned for comfort.

## 2021-04-21 NOTE — PROGRESS NOTES
3 family members at bedside at this time visiting patient. Morphine given per [facility] a few minutes ago and pt seems to be relaxing a little more.

## 2021-04-21 NOTE — PLAN OF CARE
Problem: Falls - Risk of:  Goal: Will remain free from falls  Description: Will remain free from falls  4/20/2021 2225 by Martina Cross RN  Outcome: Ongoing  4/20/2021 1815 by Chely Garcia RN  Outcome: Ongoing  Goal: Absence of physical injury  Description: Absence of physical injury  4/20/2021 2225 by Martina Cross RN  Outcome: Ongoing  4/20/2021 1815 by Chely Garcia RN  Outcome: Ongoing     Problem: Skin Integrity:  Goal: Will show no infection signs and symptoms  Description: Will show no infection signs and symptoms  4/20/2021 2225 by Martina Cross RN  Outcome: Ongoing  4/20/2021 1815 by Chely Garcia RN  Outcome: Ongoing  Goal: Absence of new skin breakdown  Description: Absence of new skin breakdown  4/20/2021 2225 by Martina Cross RN  Outcome: Ongoing  4/20/2021 1815 by Chely Garcia RN  Outcome: Ongoing     Problem: Nutrition  Goal: Optimal nutrition therapy  4/20/2021 2225 by Martina Cross RN  Outcome: Ongoing  4/20/2021 1815 by Chely Garcia RN  Outcome: Ongoing     Problem: OXYGENATION/RESPIRATORY FUNCTION  Goal: Patient will maintain patent airway  4/20/2021 2225 by Martina Cross RN  Outcome: Ongoing  4/20/2021 1815 by Chely Garcia RN  Outcome: Ongoing  Goal: Patient will achieve/maintain normal respiratory rate/effort  Description: Respiratory rate and effort will be within normal limits for the patient  4/20/2021 2225 by Martina Cross RN  Outcome: Ongoing  4/20/2021 1815 by Chely Garcia RN  Outcome: Ongoing     Problem: HEMODYNAMIC STATUS  Goal: Patient has stable vital signs and fluid balance  4/20/2021 2225 by Martina Cross RN  Outcome: Ongoing  4/20/2021 1815 by Chely Garcia RN  Outcome: Ongoing     Problem: FLUID AND ELECTROLYTE IMBALANCE  Goal: Fluid and electrolyte balance are achieved/maintained  4/20/2021 2225 by Martina Cross RN  Outcome: Ongoing  4/20/2021 1815 by Chely Garcia RN  Outcome: Ongoing     Problem: ACTIVITY INTOLERANCE/IMPAIRED MOBILITY  Goal: Mobility/activity is maintained at optimum level for patient  4/20/2021 2225 by Akila Yost RN  Outcome: Ongoing  4/20/2021 1815 by Yessica Grossman RN  Outcome: Ongoing     Problem: Discharge Planning:  Goal: Discharged to appropriate level of care  Description: Discharged to appropriate level of care  4/20/2021 2225 by Akila Yost RN  Outcome: Ongoing  4/20/2021 1815 by Yessica Grossman RN  Outcome: Ongoing  Goal: Participates in care planning  Description: Participates in care planning  4/20/2021 2225 by Akila Yost RN  Outcome: Ongoing  4/20/2021 1815 by Yessica Grossman RN  Outcome: Ongoing     Problem: Airway Clearance - Ineffective:  Goal: Clear lung sounds  Description: Clear lung sounds  4/20/2021 2225 by Akila Yost RN  Outcome: Ongoing  4/20/2021 1815 by Yessica Grossman RN  Outcome: Ongoing  Goal: Ability to maintain a clear airway will improve  Description: Ability to maintain a clear airway will improve  4/20/2021 2225 by Akila Yost RN  Outcome: Ongoing  4/20/2021 1815 by Yessica Grossman RN  Outcome: Ongoing     Problem: Fluid Volume - Deficit:  Goal: Achieves intake and output within specified parameters  Description: Achieves intake and output within specified parameters  4/20/2021 2225 by Akila Yost RN  Outcome: Ongoing  4/20/2021 1815 by Yessica Grossman RN  Outcome: Ongoing     Problem: Gas Exchange - Impaired:  Goal: Levels of oxygenation will improve  Description: Levels of oxygenation will improve  4/20/2021 2225 by Akila Yost RN  Outcome: Ongoing  4/20/2021 1815 by Yessica Grossman RN  Outcome: Ongoing     Problem:  Activity:  Goal: Ability to tolerate increased activity will improve  Description: Ability to tolerate increased activity will improve  4/20/2021 2225 by Akila Yost RN  Outcome: Ongoing  4/20/2021 1815 by Hanh Vernon GRANT Humphrey  Outcome: Ongoing  Goal: Expression of feelings of increased energy will increase  Description: Expression of feelings of increased energy will increase  4/20/2021 2225 by Ant Light RN  Outcome: Ongoing  4/20/2021 1815 by Adele Moy RN  Outcome: Ongoing     Problem: Cardiac:  Goal: Ability to maintain an adequate cardiac output will improve  Description: Ability to maintain an adequate cardiac output will improve  4/20/2021 2225 by Ant Light RN  Outcome: Ongoing  4/20/2021 1815 by Adele Moy RN  Outcome: Ongoing  Goal: Complications related to the disease process, condition or treatment will be avoided or minimized  Description: Complications related to the disease process, condition or treatment will be avoided or minimized  4/20/2021 2225 by Ant Light RN  Outcome: Ongoing  4/20/2021 1815 by Adele Moy RN  Outcome: Ongoing     Problem: Health Behavior:  Goal: Ability to manage health-related needs will improve  Description: Ability to manage health-related needs will improve  4/20/2021 2225 by Ant Light RN  Outcome: Ongoing  4/20/2021 1815 by Adele Moy RN  Outcome: Ongoing     Problem: Safety:  Goal: Ability to remain free from injury will improve  Description: Ability to remain free from injury will improve  4/20/2021 2225 by Ant Light RN  Outcome: Ongoing  4/20/2021 1815 by Adele Moy RN  Outcome: Ongoing  Goal: Will show no signs and symptoms of excessive bleeding  Description: Will show no signs and symptoms of excessive bleeding  4/20/2021 2225 by Ant Light RN  Outcome: Ongoing  4/20/2021 1815 by Adele Moy RN  Outcome: Ongoing

## 2021-04-21 NOTE — PROGRESS NOTES
Hospitalist Progress Note      PCP: Dimitris Mariee MD    Date of Admission: 4/19/2021        Subjective: tired, had a large black BM, still with SOB. No chest pain. Medications:  Reviewed    Infusion Medications    furosemide (LASIX) 1mg/ml infusion 5 mg/hr (04/21/21 0120)    sodium chloride      dilTIAZem 10 mg/hr (04/20/21 2301)     Scheduled Medications    cefepime  2,000 mg Intravenous Q12H    metroNIDAZOLE  500 mg Intravenous Q8H    ipratropium  0.5 mg Nebulization Q4H WA    albumin human  25 g Intravenous BID    potassium chloride  40 mEq Oral Once    buPROPion  150 mg Oral Daily    metoprolol succinate  50 mg Oral Daily    sodium chloride flush  5-40 mL Intravenous 2 times per day    [Held by provider] apixaban  5 mg Oral BID    atorvastatin  10 mg Oral Daily     PRN Meds: sodium chloride flush, sodium chloride, promethazine **OR** ondansetron, morphine, acetaminophen **OR** acetaminophen      Intake/Output Summary (Last 24 hours) at 4/21/2021 0748  Last data filed at 4/21/2021 0659  Gross per 24 hour   Intake 100 ml   Output 3750 ml   Net -3650 ml       Physical Exam Performed:    /72   Pulse 97   Temp 97.1 °F (36.2 °C) (Axillary)   Resp 22   Ht 5' 7\" (1.702 m)   Wt 123 lb 7.3 oz (56 kg)   SpO2 100%   BMI 19.34 kg/m²     General appearance: mild respiratory distress  Neck: Supple  Respiratory:  Diminished breath sounds bilaterally, L>R   Cardiovascular: irregularly irregular   Abdomen: Soft, non-tender  Musculoskeletal: No clubbing, cyanosis   Skin: Skin color, texture, turgor normal.  No rashes or lesions.   Neurologic:  Moving all extremities   Psychiatric: Alert   Capillary Refill: Brisk,3 seconds, normal   Peripheral Pulses: +2 palpable, equal bilaterally       Labs:   Recent Labs     04/19/21  0100 04/20/21  0506   WBC 14.3* 16.2*   HGB 9.9* 9.6*   HCT 30.1* 29.0*    193     Recent Labs     04/19/21  0100 04/20/21  0506 04/21/21  0040   * 141 142   K 4.7 3. 1* 4.0    99 98*   CO2 33* 33* 38*   BUN 35* 28* 41*   CREATININE 0.8 0.7 0.7   CALCIUM 8.5 7.1* 6.6*     Recent Labs     04/19/21  0100 04/20/21  0506   AST 13* 11*   ALT 28 21   BILITOT <0.2 <0.2   ALKPHOS 129 115     No results for input(s): INR in the last 72 hours. Recent Labs     04/19/21  0100 04/19/21  0923   TROPONINI 0.02* <0.01       Urinalysis:      Lab Results   Component Value Date    NITRU Negative 04/21/2021    WBCUA 1 04/21/2021    BACTERIA 3+ 04/03/2021    RBCUA 0-2 04/21/2021    BLOODU Negative 04/21/2021    SPECGRAV 1.011 04/21/2021    GLUCOSEU Negative 04/21/2021       Radiology:  VL Extremity Venous Bilateral   Final Result      CT CHEST PULMONARY EMBOLISM W CONTRAST   Final Result   1. No pulmonary embolus. 2. Large bilateral effusions with opacities in the mid to lower lung zone   which may represent atelectasis or infection. 3. Significant airway attenuation and secretions as described. 4. Moderate hiatal hernia. 5. Severe atherosclerosis. XR CHEST PORTABLE   Final Result   Stable cardiomegaly with slowly resolving pulmonary edema      Hazy perihilar and bibasilar opacities which is slightly less prominent.       Small bibasilar pleural effusions which are more apparent         VASCULAR REPORT    (Results Pending)           Assessment/Plan:    Active Hospital Problems    Diagnosis    COPD (chronic obstructive pulmonary disease) ([facility] [address] [J44.9]     Priority: High    Tobacco abuse [Z72.0]     Priority: High    Essential hypertension [I10]     Priority: High    Low back pain [M54.5]     Priority: Medium    Abnormal CT of the chest [R93.89]    Atrial fibrillation with rapid ventricular response (HCC) [I48.91]    Bilateral pleural effusion [J90]    Pneumonia [J18.9]    Elevated troponin [R77.8]    Elevated brain natriuretic peptide (BNP) level [R79.89]    Anemia [D64.9]    Severe malnutrition (HCC) [E43]    Acute on chronic diastolic heart failure (HCC) [I50.33]

## 2021-04-21 NOTE — CONSULTS
[address] [place] [address]                                  CONSULTATION    PATIENT NAME: Rowena Springer                  :        1940  MED REC NO:   [id number]                          ROOM:       8079  ACCOUNT NO:   [de-identified]                           ADMIT DATE: 2021  PROVIDER:     Kristian Hoover MD    ONCOLOGY CONSULTATION    CONSULT DATE:  2021    REASON FOR CONSULTATION:  History of myeloma with a recent compression  fracture. CONSULTING PROVIDER:  Dr. Thea Sicard:  The patient is an 78-year-old female who I  diagnosed recently with smoldering myeloma, who presented to the  hospital with a few-week history of progressive shortness of breath. She actually underwent a T9 kyphoplasty for compression fracture a week  before the admission. A CT pulmonary embolus study was done that showed  bilateral pleural effusions but no PE. She is on BiPAP at this time. Oncology was consulted due to her history of myeloma and her recent  compression fracture. PAST MEDICAL HISTORY:  1.  Multiple myeloma. She had a bone marrow biopsy in 2021 that  showed 15% plasma cells. She had no signs of end-organ damage and was  diagnosed with smoldering myeloma and it was decided to follow her. 2.  Osteoporosis. 3.  Asthma. 4.  COPD. 5.  Depression. 6.  Chronic kidney disease. PAST SURGICAL HISTORY:  Breast lumpectomy for benign lesion. ALLERGIES:  She is allergic to CODEINE, IODINE, and LISINOPRIL which  cause unknown reactions. MEDICATIONS:  Lipitor 10 mg p.o. daily, Wellbutrin  mg p.o. daily,  cefepime, metoprolol XL 50 mg p.o. daily, Flagyl. SOCIAL HISTORY:  She quit smoking six months ago and has a 30-pack-year  history. She does not drink. She is retired. FAMILY HISTORY:  Noncontributory.     REVIEW OF SYSTEMS:  She denies any recent fever, chills,

## 2021-04-21 NOTE — PROGRESS NOTES
Palliative care spoke with daughter and wants pt transferred to hospice. Will give 1 unit PRBC for comfort to help patient. Call to blood bank to finish stat type and screen.

## 2021-04-21 NOTE — PROGRESS NOTES
Unable to get an accurate temp on patient at this time. Rectal said 94.8 but had stool all over it as she continously is leaking. Mouth and axillary unable to read.

## 2021-04-21 NOTE — DISCHARGE SUMMARY
04/20/2021       Renal:    Lab Results   Component Value Date     04/21/2021    K 3.8 04/21/2021    K 3.3 04/03/2021    CL 98 04/21/2021    CO2 30 04/21/2021    BUN 47 04/21/2021    CREATININE 0.9 04/21/2021    CALCIUM 6.7 04/21/2021    PHOS 1.6 04/12/2021         Significant Diagnostic Studies    Radiology:   XR CHEST PORTABLE   Final Result   1. Suspected small left apical pneumothorax. 2. Stable to decreased pleural fluid on the left with persistent small fluid   at the base. 3. Pulmonary vascular congestion. Findings were discussed with Lisa Wayne at 10:08 am on   4/21/2021. VL Extremity Venous Bilateral   Final Result      CT CHEST PULMONARY EMBOLISM W CONTRAST   Final Result   1. No pulmonary embolus. 2. Large bilateral effusions with opacities in the mid to lower lung zone   which may represent atelectasis or infection. 3. Significant airway attenuation and secretions as described. 4. Moderate hiatal hernia. 5. Severe atherosclerosis. XR CHEST PORTABLE   Final Result   Stable cardiomegaly with slowly resolving pulmonary edema      Hazy perihilar and bibasilar opacities which is slightly less prominent. Small bibasilar pleural effusions which are more apparent         VASCULAR REPORT    (Results Pending)   US THORACENTESIS Which side should the procedure be performed?  Left    (Results Pending)          Consults:     IP CONSULT TO PHARMACY  IP CONSULT TO HOSPITALIST  IP CONSULT TO CARDIOLOGY  IP CONSULT TO DIETITIAN  IP CONSULT TO GI  IP CONSULT TO PULMONOLOGY  IP CONSULT TO HEM/ONC  IP CONSULT TO PALLIATIVE CARE    Disposition:  hospice      Condition at Discharge: Stable    Discharge Instructions/Follow-up:  Hospice care     Code Status:  DNR-CC     Activity: activity as tolerated    Diet: regular diet      Discharge Medications:     Current Discharge Medication List           Details   pantoprazole (PROTONIX) 40 MG tablet Take 1 tablet by mouth 2 times daily (before meals)  Qty: 60 tablet, Refills: 0              Details   buPROPion (WELLBUTRIN XL) 150 MG extended release tablet Take 150 mg by mouth daily      predniSONE (DELTASONE) 10 MG tablet Take 10 mg by mouth 2 times daily      fluticasone-salmeterol (ADVAIR) 250-50 MCG/DOSE AEPB INHALE ONE PUFF BY MOUTH TWICE A DAY  Qty: 60 each, Refills: 3    Associated Diagnoses: Pulmonary emphysema, unspecified emphysema type (HCC)      albuterol sulfate  (90 Base) MCG/ACT inhaler INHALE 2 PUFF(S) BY MOUTH EVERY 4 HOURS AS NEEDED FOR WHEEZING  Qty: 8.5 g, Refills: 0    Associated Diagnoses: Pulmonary emphysema, unspecified emphysema type ([address] Moderate persistent asthma without complication      tiZANidine (ZANAFLEX) 4 MG tablet TAKE ONE TABLET BY MOUTH THREE TIMES A DAY AS NEEDED  Qty: 90 tablet, Refills: 0      furosemide (LASIX) 20 MG tablet TAKE ONE TABLET BY MOUTH DAILY  Qty: 90 tablet, Refills: 0    Associated Diagnoses: Venous insufficiency of both lower extremities      theophylline (UNIPHYL) 400 MG extended release tablet TAKE 1/2 TABLET BY MOUTH TWICE A DAY  Qty: 90 tablet, Refills: 0    Associated Diagnoses: Pulmonary emphysema, unspecified emphysema type (HCC)      nabumetone (RELAFEN) 750 MG tablet TAKE ONE TABLET BY MOUTH DAILY  Qty: 90 tablet, Refills: 0    Associated Diagnoses: Primary osteoarthritis, unspecified site      !! Multiple Vitamins-Minerals (PRESERVISION AREDS PO) Take 1 tablet by mouth daily       !!  Multiple Vitamins-Minerals (MULTIVITAMIN ADULTS) TABS Take 1 tablet by mouth daily       tiotropium (SPIRIVA HANDIHALER) 18 MCG inhalation capsule INHALE THE ENTIRE CONTENTS OF 1 CAPSULE ONCE A DAY USING HANDIHALER DEVICE  Qty: 90 capsule, Refills: 0    Associated Diagnoses: Pulmonary emphysema, unspecified emphysema type (HCC)      acetaminophen (TYLENOL) 500 MG tablet Take 500 mg by mouth 3 times daily as needed for Pain       Incontinence Supply Disposable (INCONTINENCE BRIEF LARGE) MISC As needed. Approximately 150 per month  Qty: 450 each, Refills: 3    Associated Diagnoses: Uterovaginal prolapse; Continuous leakage of urine       !! - Potential duplicate medications found. Please discuss with provider. Time Spent on discharge is more than 45 minutes in the examination, evaluation, counseling and review of medications and discharge plan. Signed:    Ivelisse Mcdaniel MD   4/21/2021      Thank you Javi Walton MD for the opportunity to be involved in this patient's care. If you have any questions or concerns please feel free to contact me at [phone].

## 2021-04-22 ENCOUNTER — TELEPHONE (OUTPATIENT)
Dept: FAMILY MEDICINE CLINIC | Age: 81
End: 2021-04-22

## 2021-04-22 LAB — HEMATOLOGY PATH CONSULT: NORMAL

## 2021-04-23 LAB
BLOOD CULTURE, ROUTINE: NORMAL
CULTURE, BLOOD 2: NORMAL

## 2021-06-02 ENCOUNTER — CLINICAL DOCUMENTATION (OUTPATIENT)
Dept: OTHER | Age: 81
End: 2021-06-02

## (undated) DEVICE — BONE BIOPSY DEVICE F07A TAPERED SIZE 2: Brand: MEDTRONIC REUSABLE INSTRUMENTS

## (undated) DEVICE — MINOR SET UP PK

## (undated) DEVICE — STRIP,CLOSURE,WOUND,MEDI-STRIP,1/2X4: Brand: MEDLINE

## (undated) DEVICE — COVER LT HNDL BLU PLAS

## (undated) DEVICE — BONE TAMP KIT KEX152EB FF E2 15/2 OI: Brand: KYPHON EXPRESS II KYPHOPAK TRAY

## (undated) DEVICE — SHEET,DRAPE,53X77,STERILE: Brand: MEDLINE

## (undated) DEVICE — INTENDED FOR TISSUE SEPARATION, AND OTHER PROCEDURES THAT REQUIRE A SHARP SURGICAL BLADE TO PUNCTURE OR CUT.: Brand: BARD-PARKER ® STAINLESS STEEL BLADES

## (undated) DEVICE — GOWN,AURORA,NONREINF,RAGLAN,XXL,STERILE: Brand: MEDLINE

## (undated) DEVICE — UNDERGLOVE SURG SZ 8 FNGR THK0.21MIL GRN LTX BEAD CUF

## (undated) DEVICE — SOLUTION IV IRRIG WATER 1000ML POUR BRL 2F7114

## (undated) DEVICE — GAUZE,SPONGE,2"X2",8PLY,STERILE,LF,2'S: Brand: MEDLINE

## (undated) DEVICE — SYRINGE MED 10ML LUERLOCK TIP W/O SFTY DISP

## (undated) DEVICE — SOLUTION PREP POVIDONE IOD FOR SKIN MUCOUS MEM PRIOR TO

## (undated) DEVICE — GOWN SIRUS NONREIN LG W/TWL: Brand: MEDLINE INDUSTRIES, INC.

## (undated) DEVICE — 3M™ TEGADERM™ TRANSPARENT FILM DRESSING FRAME STYLE, 1624W, 2-3/8 IN X 2-3/4 IN (6 CM X 7 CM), 100/CT 4CT/CASE: Brand: 3M™ TEGADERM™

## (undated) DEVICE — Z DISCONTINUED BY MEDLINE USE 2711682 TRAY SKIN PREP DRY W/ PREM GLV

## (undated) DEVICE — MERCY HEALTH WEST TURNOVER: Brand: MEDLINE INDUSTRIES, INC.

## (undated) DEVICE — 3M™ STERI-STRIP™ COMPOUND BENZOIN TINCTURE 40 BAGS/CARTON 4 CARTONS/CASE C1544: Brand: 3M™ STERI-STRIP™

## (undated) DEVICE — KIT JACK TBL PT CARE

## (undated) DEVICE — SHEET, T, LAPAROTOMY, STERILE: Brand: MEDLINE

## (undated) DEVICE — INTRODUCER T34A KYPHON EX OI DIA AND BEV: Brand: KYPHON® EXPRESS™ OSTEO INTRODUCER® SYSTEM

## (undated) DEVICE — GLOVE SURG SZ 8 CRM LTX FREE POLYISOPRENE POLYMER BEAD ANTI

## (undated) DEVICE — SOLUTION SCRB 4OZ 10% POVIDONE IOD ANTIMIC BTL

## (undated) DEVICE — DRAPE C ARM UNIV W41XL74IN CLR PLAS XR VELC CLSR POLY STRP

## (undated) DEVICE — DRAPE THER FLUID WARMING 66X44 IN FLAT SLUSH DBL DISC ORS